# Patient Record
Sex: FEMALE | Race: WHITE | NOT HISPANIC OR LATINO | Employment: OTHER | URBAN - METROPOLITAN AREA
[De-identification: names, ages, dates, MRNs, and addresses within clinical notes are randomized per-mention and may not be internally consistent; named-entity substitution may affect disease eponyms.]

---

## 2017-02-01 ENCOUNTER — ALLSCRIPTS OFFICE VISIT (OUTPATIENT)
Dept: OTHER | Facility: OTHER | Age: 82
End: 2017-02-01

## 2017-08-16 ENCOUNTER — ALLSCRIPTS OFFICE VISIT (OUTPATIENT)
Dept: OTHER | Facility: OTHER | Age: 82
End: 2017-08-16

## 2018-01-14 VITALS
OXYGEN SATURATION: 97 % | HEART RATE: 82 BPM | HEIGHT: 64 IN | BODY MASS INDEX: 33.46 KG/M2 | DIASTOLIC BLOOD PRESSURE: 89 MMHG | WEIGHT: 196 LBS | SYSTOLIC BLOOD PRESSURE: 122 MMHG

## 2018-01-14 VITALS
HEART RATE: 72 BPM | HEIGHT: 64 IN | SYSTOLIC BLOOD PRESSURE: 98 MMHG | BODY MASS INDEX: 32.61 KG/M2 | DIASTOLIC BLOOD PRESSURE: 72 MMHG | WEIGHT: 191 LBS | OXYGEN SATURATION: 96 %

## 2018-01-16 NOTE — PROGRESS NOTES
Assessment  Assessed    1  Essential hypertension (401 9) (I10)   2  Hyperlipidemia (272 4) (E78 5)   3  Paroxysmal atrial fibrillation (427 31) (I48 0)   4  Chronic anticoagulation (V58 61) (Z79 01)    Plan  AF (atrial fibrillation)    · Eliquis 5 MG Oral Tablet; TAKE 1 TABLETBY MOUTH TWICE DAILY   Rx By: Arslan Carlson; Dispense: 30 Days ; #:60 Tablet; Refill: 5; For: AF (atrial fibrillation); LUCILA = N; Print Rx    Discussion/Summary  Cardiology Discussion Summary Free Text Note Form [facility]:   Patient with multiple medical problems who seems to be doing reasonably well from cardiac standpoint  Previous studies reviewed with patient, medications reviewed and possible side effects discussed  Continue risk factor modification  All questions answered  Safety measures reviewed  Patient advised to report any problems prompting medical attention  Discussed concepts of atherosclerosis, atrial fibrillation, anticoagulation signs and symptoms of cardiac disease  Patient had a stroke and is thought to be secondary to embolic phenomena secondary to paroxysmal atrial fibrillation  Risks and benefits of anticoagulation discussed again with patient and family  Patient to report any bleeding issues  Patient will continue to follow-up with her primary care physician  Follow-up in 6 months or earlier as needed  Counseling Documentation With Imm: The patient was counseled regarding instructions for management, risk factor reductions, impressions, risks and benefits of treatment options  Chief Complaint  Chief Complaint Chronic Condition [facility]: Patient is here today for follow up of chronic conditions described in HPI  History of Present Illness  Cardiology HPI Free Text Note Form [facility]: Patient presents for follow-up visit  She denies any history of chest pain, no shortness of breath out of the ordinary  No history of leg edema, orthopnea, PND  No history of presyncope, syncope   Patient states that she has been compliant with all her present medications she is on anticoagulation for paroxysmal atrial fibrillation and is tolerating it well  Patient was also seen by neurologist recently  Review of Systems  Cardiology Female ROS:     Cardiac: as noted in HPI  Skin: No complaints of nonhealing sores or skin rash  Genitourinary: No complaints of recurrent urinary tract infections, frequent urination at night, difficult urination, blood in urine, kidney stones, loss of bladder control, kidney problems, denies any birth control or hormone replacement, is not post menopausal, not currently pregnant  Psychological: No complaints of feeling depressed, anxiety, panic attacks, or difficulty concentrating  General: No complaints of trouble sleeping, lack of energy, fatigue, appetite changes, weight changes, fever, frequent infections, or night sweats  Respiratory: No complaints of shortness of breath, cough with sputum, or wheezing  HEENT: No complaints of serious problems, hearing problems, nose problems, throat problems, or snoring  Gastrointestinal: No complaints of liver problems, nausea, vomiting, heartburn, constipation, bloody stools, diarrhea, problems swallowing, adbominal pain, or rectal bleeding  Hematologic: No complaints of bleeding disorders, anemia, blood clots, or excessive brusing  Neurological: as noted in HPI   Musculoskeletal: No complaints of arthritis, back pain, or painfull swelling  ROS Reviewed:   ROS reviewed  Active Problems  Problems    1  Abnormal stress test (794 39) (R94 39)   2  Essential hypertension (401 9) (I10)   3  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)   4  Hyperlipidemia (272 4) (E78 5)   5  Shortness of breath (786 05) (R06 02)    Past Medical History  Problems    1  History of essential hypertension (V12 59) (Z86 79)   2  History of hyperlipidemia (V12 29) (Z86 39)  Active Problems And Past Medical History Reviewed:    The active problems and past medical history were reviewed and updated today  Family History  Mother    1  Family history of Diabetes (250 00) (E11 9)  Maternal Grandmother    2  Family history of High blood pressure (401 9) (I10)  Family History Reviewed: The family history was reviewed and updated today  Social History  Problems    · Never a smoker   · No alcohol use   · No drug use  Social History Reviewed: The social history was reviewed and updated today  Current Meds   1  Aspirin 81 MG Oral Tablet; Take 1 tablet daily Recorded   2  Atorvastatin Calcium 20 MG Oral Tablet; Take 1 tablet daily Recorded   3  Eliquis 5 MG Oral Tablet; Take 1 tablet twice daily; Therapy: [id number] to (Evaluate:30Apr2016)  Requested for: 67Edu6150; Last   Rx:[id number] Ordered   4  Laxative Pills TABS; USE AS DIRECTED Recorded   5  Lisinopril-Hydrochlorothiazide 20-12 5 MG Oral Tablet; 1/2 tab daily; Therapy: [phone]) to Recorded   6  Metoprolol Succinate ER 50 MG Oral Tablet Extended Release 24 Hour; Therapy: ([phone]) to Recorded  Medication List Reviewed: The medication list was reviewed and updated today  Allergies  Medication    1  Percocet TABS   2  Vicodin TABS    Vitals  Vital Signs [Data Includes: Current Encounter]    Recorded: 71IPN5915 11:04AM   Heart Rate 66   Systolic 169   Diastolic 64   Height 5 ft 4 in   Weight 191 lb 6 08 oz   BMI Calculated 32 85   BSA Calculated 1 93     Physical Exam    Constitutional   General appearance: No acute distress, well appearing and well nourished  Eyes   Conjunctiva and Sclera examination: Conjunctiva pink, sclera anicteric  Ears, Nose, Mouth, and Throat - Oropharynx: Clear, nares are clear, mucous membranes are moist    Neck   Neck and thyroid: Normal, supple, trachea midline, no thyromegaly  Pulmonary   Respiratory effort: No increased work of breathing or signs of respiratory distress      Auscultation of lungs: Clear to auscultation, no rales, no rhonchi, no wheezing, good air movement  Cardiovascular   Auscultation of heart: Normal rate and rhythm, normal S1 and S2, no murmurs  Carotid pulses: Normal, 2+ bilaterally  Peripheral vascular exam: Normal pulses throughout, no tenderness, erythema or swelling  Pedal pulses: Normal, 2+ bilaterally  Examination of extremities for edema and/or varicosities: Normal     Abdomen   Abdomen: Non-tender and no distention  Liver and spleen: No hepatomegaly or splenomegaly  Musculoskeletal Gait and station: Normal gait  Digits and nails: Normal without clubbing or cyanosis  Inspection/palpation of joints, bones, and muscles: Normal, ROM normal     Skin - Skin and subcutaneous tissue: Normal without rashes or lesions  Skin is warm and well perfused, normal turgor  Neurologic - Cranial nerves: II - XII intact  Speech: Normal     Psychiatric - Orientation to person, place, and time: Normal  Mood and affect: Normal       Future Appointments    Date/Time Provider Specialty Site   07/27/2016 11:00 AM NOELLE Vera   [place] [facility] [place] [facility]     Signatures   Electronically signed by : NOELLE Gutiérrez ; Jan 28 2016  8:14PM EST                       (Author)

## 2018-02-12 RX ORDER — METOPROLOL SUCCINATE 25 MG/1
TABLET, EXTENDED RELEASE ORAL
COMMUNITY
End: 2020-06-29 | Stop reason: SDUPTHER

## 2018-02-12 RX ORDER — LISINOPRIL AND HYDROCHLOROTHIAZIDE 20; 12.5 MG/1; MG/1
0.5 TABLET ORAL DAILY
COMMUNITY

## 2018-02-12 RX ORDER — ATORVASTATIN CALCIUM 20 MG/1
1 TABLET, FILM COATED ORAL DAILY
COMMUNITY

## 2018-02-12 RX ORDER — LORATADINE 10 MG/1
10 TABLET ORAL DAILY
Refills: 5 | COMMUNITY
Start: 2017-12-07

## 2018-02-12 RX ORDER — APIXABAN 5 MG/1
5 TABLET, FILM COATED ORAL 2 TIMES DAILY
Refills: 3 | COMMUNITY
Start: 2017-12-20

## 2018-02-14 ENCOUNTER — OFFICE VISIT (OUTPATIENT)
Dept: CARDIOLOGY CLINIC | Facility: CLINIC | Age: 83
End: 2018-02-14
Payer: MEDICARE

## 2018-02-14 VITALS
BODY MASS INDEX: 30.29 KG/M2 | HEART RATE: 53 BPM | SYSTOLIC BLOOD PRESSURE: 132 MMHG | OXYGEN SATURATION: 97 % | WEIGHT: 177.4 LBS | HEIGHT: 64 IN | DIASTOLIC BLOOD PRESSURE: 70 MMHG

## 2018-02-14 DIAGNOSIS — E78.2 COMBINED HYPERLIPIDEMIA: ICD-10-CM

## 2018-02-14 DIAGNOSIS — I10 HYPERTENSION, ESSENTIAL: ICD-10-CM

## 2018-02-14 DIAGNOSIS — Z79.01 CHRONIC ANTICOAGULATION: ICD-10-CM

## 2018-02-14 DIAGNOSIS — I48.0 PAROXYSMAL A-FIB (HCC): Primary | ICD-10-CM

## 2018-02-14 PROBLEM — Z96.652 HISTORY OF LEFT KNEE REPLACEMENT: Status: ACTIVE | Noted: 2018-02-14

## 2018-02-14 PROCEDURE — 99214 OFFICE O/P EST MOD 30 MIN: CPT | Performed by: INTERNAL MEDICINE

## 2018-02-14 NOTE — PROGRESS NOTES
Cardiology Follow Up    Mona Parcel  1935  [id number]  [address] [place] [id number]    1  Paroxysmal a-fib (HCC)     2  Chronic anticoagulation     3  Hypertension, essential     4  Combined hyperlipidemia       Chief Complaint   Patient presents with    Follow-up       Interval History:  Patient presents for follow-up visit  Patient denies any history of chest pain  No shortness of breath out of the ordinary  No history of leg edema orthopnea PND  Patient has been bothered with sciatica pain  Patient did have left knee replacement surgery  Patient denies any history of bleeding issues  Patient is on anticoagulation with Coumadin  Patient does have history of TIA/CVA  She states that she has been compliant with all her present medications  Patient Active Problem List   Diagnosis    Paroxysmal a-fib ([address] )    Chronic anticoagulation    Hypertension, essential    Combined hyperlipidemia    History of left knee replacement     Past Medical History:   Diagnosis Date    Stroke [facility])      Social History     Social History    Marital status: /Civil Union     Spouse name: N/A    Number of children: N/A    Years of education: N/A     Occupational History    Not on file  Social History Main Topics    Smoking status: Never Smoker    Smokeless tobacco: Never Used    Alcohol use No    Drug use: No    Sexual activity: Not on file     Other Topics Concern    Not on file     Social History Narrative    No narrative on file      No family history on file    Past Surgical History:   Procedure Laterality Date    REPLACEMENT TOTAL KNEE         Current Outpatient Prescriptions:     atorvastatin (LIPITOR) 20 mg tablet, Take 1 tablet by mouth daily, Disp: , Rfl:     ELIQUIS 5 MG, Take 5 mg by mouth 2 (two) times a day, Disp: , Rfl: 3    lisinopril-hydrochlorothiazide (PRINZIDE,ZESTORETIC) 20-12 5 MG per tablet, Take 0 5 tablets by mouth daily, Disp: , Rfl:     loratadine (CLARITIN) 10 mg tablet, Take 10 mg by mouth daily, Disp: , Rfl: 5    metoprolol succinate (TOPROL-XL) 25 mg 24 hr tablet, Take 1 tablet by mouth daily, Disp: , Rfl:     Sennosides (LAXATIVE PILLS PO), Take 1 tablet by mouth Twice daily, Disp: , Rfl:   Allergies   Allergen Reactions    Hydrocodone     Oxycodone        Labs:  No visits with results within 2 Month(s) from this visit  Latest known visit with results is:   No results found for any previous visit  Imaging: No results found  Review of Systems:  Review of Systems   REVIEW OF SYSTEMS:  Constitutional:  Denies fever or chills   Eyes:  Denies change in visual acuity   HENT:  Denies nasal congestion or sore throat   Respiratory:  Denies cough or shortness of breath   Cardiovascular:  Denies chest pain or edema   GI:  Denies abdominal pain, nausea, vomiting, bloody stools or diarrhea   :  Denies dysuria, frequency, difficulty in micturition and nocturia  Musculoskeletal:  Sciatica back pain  Neurologic:  Denies headache, focal weakness or sensory changes   Endocrine:  Denies polyuria or polydipsia   Lymphatic:  Denies swollen glands   Psychiatric:  Denies depression or anxiety   /70   Pulse (!) 53   Ht 5' 4" (1 626 m)   Wt 80 5 kg (177 lb 6 4 oz)   SpO2 97%   BMI 30 45 kg/m²     Physical Exam:  Physical Exam   PHYSICAL EXAM:  General:  Patient is not in acute distress   Head: Normocephalic, Atraumatic  HEENT:  Both pupils normal-size atraumatic, normocephalic, nonicteric  Neck:  JVP not raised  Trachea central  No carotid bruit  Respiratory:  normal breath sounds no crackles  no rhonchi  Cardiovascular:  Regular rate and rhythm no S3 no murmurs  GI:  Abdomen soft nontender  No organomegaly  Lymphatic:  No cervical or inguinal lymphadenopathy  Neurologic:  Patient is awake alert, oriented    Grossly nonfocal    Discussion/Summary:  Patient with multiple medical problems who seems to be doing reasonably well from cardiac standpoint  Previous studies reviewed with patient  Medications reviewed and possible side effects discussed  concepts of cardiovascular disease , signs and symptoms of heart disease  Dietary and risk factor modification reinforced  All questions answered  Safety measures reviewed  Patient advised to report any problems prompting medical attention  Patient understands the risks and benefits of anticoagulation to prevent thromboembolic risk  Patient is on Eliquis  All her medications were reviewed  Importance of salt restriction reinforced with the patient  Patient to continue present antihypertensives as well as statin medications  Follow-up with primary care physician  Followup in 6 months

## 2018-08-08 ENCOUNTER — OFFICE VISIT (OUTPATIENT)
Dept: CARDIOLOGY CLINIC | Facility: CLINIC | Age: 83
End: 2018-08-08
Payer: MEDICARE

## 2018-08-08 VITALS
HEART RATE: 83 BPM | BODY MASS INDEX: 31.76 KG/M2 | SYSTOLIC BLOOD PRESSURE: 110 MMHG | WEIGHT: 186 LBS | OXYGEN SATURATION: 98 % | HEIGHT: 64 IN | DIASTOLIC BLOOD PRESSURE: 78 MMHG

## 2018-08-08 DIAGNOSIS — Z79.01 CHRONIC ANTICOAGULATION: ICD-10-CM

## 2018-08-08 DIAGNOSIS — I10 HYPERTENSION, ESSENTIAL: ICD-10-CM

## 2018-08-08 DIAGNOSIS — E78.2 COMBINED HYPERLIPIDEMIA: ICD-10-CM

## 2018-08-08 DIAGNOSIS — I48.0 PAROXYSMAL A-FIB (HCC): Primary | ICD-10-CM

## 2018-08-08 PROCEDURE — 99214 OFFICE O/P EST MOD 30 MIN: CPT | Performed by: INTERNAL MEDICINE

## 2018-08-08 RX ORDER — VITAMIN B COMPLEX
1 CAPSULE ORAL DAILY
COMMUNITY
End: 2019-11-27

## 2018-08-08 NOTE — PROGRESS NOTES
[facility]  [address] [facility]  [address]  Cardiology Follow Up    Hussein Katharina  1935  [id number]      1  [facility]ox[facility]fib ([facility] )     2  Hypertension, essential     3  Chronic anticoagulation     4  Combined hyperlipidemia         Chief Complaint   Patient presents with    Follow-up       Interval History:   Patient presents for follow-up visit  Patient denies any history of chest pain shortness of breath  Patient denies any history of leg edema or orthopnea PND  No history of presyncope syncope  Patient states compliance with the present list of medications  Patient denies any bleeding issues  Patient is on anticoagulation for paroxysmal atrial fibrillation  Patient Active Problem List   Diagnosis    [facility]    Chronic anticoagulation    Hypertension, essential    Combined hyperlipidemia    History of left knee replacement     Past Medical History:   Diagnosis Date    Stroke [facility])      Social History     Social History    Marital status: /Civil Union     Spouse name: N/A    Number of children: N/A    Years of education: N/A     Occupational History    Not on file  Social History Main Topics    Smoking status: Never Smoker    Smokeless tobacco: Never Used    Alcohol use No    Drug use: No    Sexual activity: Not on file     Other Topics Concern    Not on file     Social History Narrative    No narrative on file      History reviewed  No pertinent family history    Past Surgical History:   Procedure Laterality Date    REPLACEMENT TOTAL KNEE         Current Outpatient Prescriptions:     atorvastatin (LIPITOR) 20 mg tablet, Take 1 tablet by mouth daily, Disp: , Rfl:     b complex vitamins capsule, Take 1 capsule by mouth daily, Disp: , Rfl:     ELIQUIS 5 MG, Take 5 mg by mouth 2 (two) times a day, Disp: , Rfl: 3    lisinopril-hydrochlorothiazide (PRINZIDE,ZESTORETIC) 20-12 5 MG per tablet, Take 0 5 tablets by mouth daily, Disp: , Rfl:     loratadine (CLARITIN) 10 mg tablet, Take 10 mg by mouth daily, Disp: , Rfl: 5    metoprolol succinate (TOPROL-XL) 25 mg 24 hr tablet, Take by mouth Take 1/2 tablet daily , Disp: , Rfl:     Sennosides (LAXATIVE PILLS PO), Take 1 tablet by mouth Twice daily, Disp: , Rfl:   Allergies   Allergen Reactions    Hydrocodone     Oxycodone        Labs:  No visits with results within 2 Month(s) from this visit  Latest known visit with results is:   No results found for any previous visit  Imaging: No results found  Review of Systems:  Review of Systems   REVIEW OF SYSTEMS:  Constitutional:  Denies fever or chills   Eyes:  Denies change in visual acuity   HENT:  Denies nasal congestion or sore throat   Respiratory:  Denies cough or shortness of breath   Cardiovascular:  Denies chest pain or edema   GI:  Denies abdominal pain, nausea, vomiting, bloody stools or diarrhea   :  Denies dysuria, frequency, difficulty in micturition and nocturia  Musculoskeletal:  Denies back pain or joint pain   Neurologic:  Denies headache, focal weakness or sensory changes   Endocrine:  Denies polyuria or polydipsia   Lymphatic:  Denies swollen glands   Psychiatric:  Denies depression or anxiety     Physical Exam:    /78   Pulse 83   Ht 5' 4" (1 626 m)   Wt 84 4 kg (186 lb)   SpO2 98%   BMI 31 93 kg/m²     Physical Exam   PHYSICAL EXAM:  General:  Patient is not in acute distress   Head: Normocephalic, Atraumatic  HEENT:  Both pupils normal-size atraumatic, normocephalic, nonicteric  Neck:  JVP not raised  Trachea central  No carotid bruit  Respiratory:  normal breath sounds no crackles  no rhonchi  Cardiovascular:  Regular rate and rhythm no S3 no murmurs  GI:  Abdomen soft nontender  No organomegaly  Lymphatic:  No cervical or inguinal lymphadenopathy  Neurologic:  Patient is awake alert, oriented    Grossly nonfocal    Discussion/Summary:  Patient with multiple medical problems who seems to be doing reasonably well from cardiac standpoint  Previous studies reviewed with patient  Medications reviewed and possible side effects discussed  concepts of cardiovascular disease , signs and symptoms of heart disease  Dietary and risk factor modification reinforced  All questions answered  Safety measures reviewed  Patient advised to report any problems prompting medical attention  Patient understands the risks and benefits of anticoagulation to prevent thromboembolic risk from paroxysmal atrial fibrillation  Patient report any bleeding issues  Medications reviewed  Importance of salt restriction reinforced  Follow-up with primary care physician  Followup in 6 months or earlier as needed  Patient is agreeable with the plan of care  Patient does have history of polymyalgia rheumatica and has an appointment with rheumatology is coming up soon

## 2019-11-27 ENCOUNTER — OFFICE VISIT (OUTPATIENT)
Dept: CARDIOLOGY CLINIC | Facility: CLINIC | Age: 84
End: 2019-11-27
Payer: MEDICARE

## 2019-11-27 VITALS
OXYGEN SATURATION: 98 % | HEIGHT: 63 IN | SYSTOLIC BLOOD PRESSURE: 138 MMHG | HEART RATE: 62 BPM | WEIGHT: 182 LBS | BODY MASS INDEX: 32.25 KG/M2 | DIASTOLIC BLOOD PRESSURE: 80 MMHG

## 2019-11-27 DIAGNOSIS — I48.0 PAROXYSMAL A-FIB (HCC): Primary | ICD-10-CM

## 2019-11-27 DIAGNOSIS — E78.2 COMBINED HYPERLIPIDEMIA: ICD-10-CM

## 2019-11-27 DIAGNOSIS — I10 HYPERTENSION, ESSENTIAL: ICD-10-CM

## 2019-11-27 DIAGNOSIS — Z79.01 CHRONIC ANTICOAGULATION: ICD-10-CM

## 2019-11-27 PROCEDURE — 99214 OFFICE O/P EST MOD 30 MIN: CPT | Performed by: INTERNAL MEDICINE

## 2019-11-27 RX ORDER — PREDNISONE 10 MG/1
5 TABLET ORAL DAILY
COMMUNITY

## 2019-11-27 NOTE — PROGRESS NOTES
[facility] [address] [facility] [id number]  Cardiology Follow Up    Elliott Briseno  1935  [id number]      1  Paroxysmal A-fib ([address] )     2  Chronic anticoagulation     3  Hypertension, essential     4  Combined hyperlipidemia         Chief Complaint   Patient presents with    Follow-up       Interval History:  Patient presents for follow-up visit  Patient denies any history of chest pain  Does have some shortness of breath which is stable  No history of bleeding issues  Patient was diagnosed recently to have polymyalgia rheumatica and has been on prednisone  Patient is in the process of establishing with the Rheumatology  Patient denies any history of leg edema orthopnea PND  No history of presyncope syncope  She states that she has been compliant with all her present medications      Patient Active Problem List   Diagnosis    Paroxysmal A-fib (HCC)    Chronic anticoagulation    Hypertension, essential    Combined hyperlipidemia    History of left knee replacement     Past Medical History:   Diagnosis Date    Stroke [facility])      Social History     Socioeconomic History    Marital status: /Civil Union     Spouse name: Not on file    Number of children: Not on file    Years of education: Not on file    Highest education level: Not on file   Occupational History    Not on file   Social Needs    Financial resource strain: Not on file    Food insecurity:     Worry: Not on file     Inability: Not on file    Transportation needs:     Medical: Not on file     Non-medical: Not on file   Tobacco Use    Smoking status: Never Smoker    Smokeless tobacco: Never Used   Substance and Sexual Activity    Alcohol use: No    Drug use: No    Sexual activity: Not on file   Lifestyle    Physical activity:     Days per week: Not on file     Minutes per session: Not on file    Stress: Not on file   Relationships    Social connections:     Talks on phone: Not on file     Gets together: Not on file     Attends Sabianism service: Not on file     Active member of club or organization: Not on file     Attends meetings of clubs or organizations: Not on file     Relationship status: Not on file    Intimate partner violence:     Fear of current or ex partner: Not on file     Emotionally abused: Not on file     Physically abused: Not on file     Forced sexual activity: Not on file   Other Topics Concern    Not on file   Social History Narrative    Not on file      History reviewed  No pertinent family history  Past Surgical History:   Procedure Laterality Date    REPLACEMENT TOTAL KNEE         Current Outpatient Medications:     atorvastatin (LIPITOR) 20 mg tablet, Take 1 tablet by mouth daily, Disp: , Rfl:     ELIQUIS 5 MG, Take 5 mg by mouth 2 (two) times a day, Disp: , Rfl: 3    lisinopril-hydrochlorothiazide (PRINZIDE,ZESTORETIC) 20-12 5 MG per tablet, Take 0 5 tablets by mouth daily, Disp: , Rfl:     loratadine (CLARITIN) 10 mg tablet, Take 10 mg by mouth daily, Disp: , Rfl: 5    metoprolol succinate (TOPROL-XL) 25 mg 24 hr tablet, Take by mouth Take 1/2 tablet daily , Disp: , Rfl:     predniSONE 10 mg tablet, Take by mouth daily, Disp: , Rfl:     Sennosides (LAXATIVE PILLS PO), Take 1 tablet by mouth Twice daily, Disp: , Rfl:   Allergies   Allergen Reactions    Hydrocodone     Oxycodone        Labs:  No visits with results within 2 Month(s) from this visit  Latest known visit with results is:   No results found for any previous visit  Imaging: No results found      Review of Systems:  Review of Systems   REVIEW OF SYSTEMS:  Constitutional:  Denies fever or chills   Eyes:  Denies change in visual acuity   HENT:  Denies nasal congestion or sore throat   Respiratory:  Denies cough or shortness of breath   Cardiovascular:  Denies chest pain or edema   GI:  Denies abdominal pain, nausea, vomiting, bloody stools or diarrhea   :  Denies dysuria, frequency, difficulty in micturition and nocturia  Musculoskeletal:  Denies back pain or joint pain   Neurologic:  Denies headache, focal weakness or sensory changes   Endocrine:  Denies polyuria or polydipsia   Lymphatic:  Denies swollen glands   Psychiatric:  Denies depression or anxiety     Physical Exam:    /80   Pulse 62   Ht 5' 3" (1 6 m)   Wt 82 6 kg (182 lb)   SpO2 98%   BMI 32 24 kg/m²     Physical Exam  PHYSICAL EXAM:  General:  Patient is not in acute distress   Head: Normocephalic, Atraumatic  HEENT:  Both pupils normal-size atraumatic, normocephalic, nonicteric  Neck:  JVP not raised  Trachea central  No carotid bruit  Respiratory:  normal breath sounds no crackles  no rhonchi  Cardiovascular:  Irregularly irregular  GI:  Abdomen soft nontender  No organomegaly  Lymphatic:  No cervical or inguinal lymphadenopathy  Neurologic:  Patient is awake alert, oriented   Grossly nonfocal  Extremities trace edema    Discussion/Summary:  Patient with multiple medical problems who seems to be doing reasonably well from cardiac standpoint  Previous studies reviewed with patient  Medications reviewed and possible side effects discussed  concepts of cardiovascular disease , signs and symptoms of heart disease  Dietary and risk factor modification reinforced  All questions answered  Safety measures reviewed  Patient advised to report any problems prompting medical attention  Patient understands the risks and benefits of anticoagulation to prevent thromboembolic risk from atrial fibrillation  Patient to report any bleeding issues  Importance of salt restriction reinforced  Follow-up with primary care physician as well as Rheumatology  Medications reviewed  Patient had a few questions which were answered  Follow-up in 6 months or earlier as needed

## 2020-06-29 ENCOUNTER — OFFICE VISIT (OUTPATIENT)
Dept: CARDIOLOGY CLINIC | Facility: CLINIC | Age: 85
End: 2020-06-29
Payer: MEDICARE

## 2020-06-29 VITALS
WEIGHT: 186 LBS | SYSTOLIC BLOOD PRESSURE: 140 MMHG | BODY MASS INDEX: 32.96 KG/M2 | HEART RATE: 96 BPM | HEIGHT: 63 IN | OXYGEN SATURATION: 98 % | DIASTOLIC BLOOD PRESSURE: 76 MMHG

## 2020-06-29 DIAGNOSIS — I48.0 PAROXYSMAL A-FIB (HCC): Primary | ICD-10-CM

## 2020-06-29 DIAGNOSIS — E78.2 COMBINED HYPERLIPIDEMIA: ICD-10-CM

## 2020-06-29 DIAGNOSIS — Z79.01 CHRONIC ANTICOAGULATION: ICD-10-CM

## 2020-06-29 DIAGNOSIS — I10 HYPERTENSION, ESSENTIAL: ICD-10-CM

## 2020-06-29 PROCEDURE — 99214 OFFICE O/P EST MOD 30 MIN: CPT | Performed by: INTERNAL MEDICINE

## 2020-06-29 RX ORDER — METOPROLOL SUCCINATE 25 MG/1
25 TABLET, EXTENDED RELEASE ORAL DAILY
Start: 2020-06-29

## 2020-06-29 RX ORDER — TRAMADOL HYDROCHLORIDE 50 MG/1
TABLET ORAL
COMMUNITY
Start: 2020-03-26

## 2020-06-29 RX ORDER — FLUTICASONE PROPIONATE 50 MCG
SPRAY, SUSPENSION (ML) NASAL
COMMUNITY
Start: 2020-04-19

## 2021-02-08 ENCOUNTER — TELEPHONE (OUTPATIENT)
Dept: VASCULAR SURGERY | Facility: CLINIC | Age: 86
End: 2021-02-08

## 2021-02-15 ENCOUNTER — TELEPHONE (OUTPATIENT)
Dept: ADMINISTRATIVE | Facility: HOSPITAL | Age: 86
End: 2021-02-15

## 2021-02-15 NOTE — TELEPHONE ENCOUNTER

## 2021-02-16 ENCOUNTER — OFFICE VISIT (OUTPATIENT)
Dept: VASCULAR SURGERY | Facility: CLINIC | Age: 86
End: 2021-02-16
Payer: MEDICARE

## 2021-02-16 VITALS
HEIGHT: 63 IN | HEART RATE: 78 BPM | TEMPERATURE: 97.5 F | BODY MASS INDEX: 32.25 KG/M2 | WEIGHT: 182 LBS | DIASTOLIC BLOOD PRESSURE: 80 MMHG | SYSTOLIC BLOOD PRESSURE: 140 MMHG

## 2021-02-16 DIAGNOSIS — L97.909 ATHEROSCLEROSIS OF ARTERY OF EXTREMITY WITH ULCERATION (HCC): Primary | ICD-10-CM

## 2021-02-16 DIAGNOSIS — I70.299 ATHEROSCLEROSIS OF ARTERY OF EXTREMITY WITH ULCERATION (HCC): Primary | ICD-10-CM

## 2021-02-16 PROCEDURE — 99203 OFFICE O/P NEW LOW 30 MIN: CPT | Performed by: SURGERY

## 2021-02-16 NOTE — ASSESSMENT & PLAN NOTE
Will get vascular duplex to further evaluate her foot circulation  I suspect that there are tibial disease / SFA disease that may need angiogram and intervention to improve arterial flow for wound healing  Procedure explained to patient including risks of bleeding, non healing  She is on eliquis for A fib

## 2021-02-16 NOTE — PROGRESS NOTES
Assessment/Plan:    Atherosclerosis of artery of extremity with ulceration ([address] )      Will get vascular duplex to further evaluate her foot circulation  I suspect that there are tibial disease / SFA disease that may need intervention to improve arterial flow for wound healing  Procedure explained to patient including risks of bleeding, non healing  She is on eliquis for A fib  Diagnoses and all orders for this visit:    Atherosclerosis of artery of extremity with ulceration (HCC)  -     VAS lower limb arterial duplex, complete bilateral; Future          Subjective:      Patient ID: Pankaj Avendano is a 80 y o  female  New patient w/ non-healing wound of R great toe, presents today for evaluation  Referred by Dr Jd Salazar at [facility]  HPI  New right toe ulcer ongoing for past 1 month  Does not recall any trauma causing it  Going to podiatrist for wound care  Applying neosporin  No drainage, no fever, mild redness around it  She also has pain in the toe  She has had b/l leg / calf cramps ongoing for past few weeks  The following portions of the patient's history were reviewed and updated as appropriate: allergies, current medications, past family history, past medical history, past social history, past surgical history and problem list     Review of Systems   Constitutional: Negative  HENT: Positive for hearing loss  Eyes: Negative  Respiratory: Negative  Cardiovascular: Positive for leg swelling  Gastrointestinal: Negative  Endocrine: Negative  Genitourinary: Negative  Musculoskeletal:        Leg pain   Skin: Positive for color change and wound  Allergic/Immunologic: Negative  Hematological: Bruises/bleeds easily  Psychiatric/Behavioral: Negative  I have reviewed the ROS as entered and made changes as necessary        Objective:      /80 (BP Location: Right arm, Patient Position: Sitting)   Pulse 78   Temp 97 5 °F (36 4 °C) (Tympanic)   Ht 5' 3" (1 6 m)   Wt 82 6 kg (182 lb)   BMI 32 24 kg/m²          Physical Exam  Vitals signs and nursing note reviewed  Constitutional:       Appearance: Normal appearance  HENT:      Head: Normocephalic and atraumatic  Cardiovascular:      Rate and Rhythm: Normal rate  Rhythm irregular  Pulses:           Femoral pulses are 2+ on the right side and 2+ on the left side  Popliteal pulses are 0 on the right side and 0 on the left side  Dorsalis pedis pulses are detected w/ Doppler on the right side and detected w/ Doppler on the left side  Posterior tibial pulses are detected w/ Doppler on the right side and detected w/ Doppler on the left side  Heart sounds: Normal heart sounds  Comments: Monophasic and bi phasic DP and PT  Pulmonary:      Effort: Pulmonary effort is normal       Breath sounds: Normal breath sounds  Abdominal:      General: There is no distension  Palpations: Abdomen is soft  There is no mass  Tenderness: There is no abdominal tenderness  Musculoskeletal:         General: Tenderness (right great toe) present  No signs of injury  Right lower leg: No edema  Left lower leg: No edema  Skin:     General: Skin is warm and dry  Capillary Refill: Capillary refill takes less than 2 seconds  Findings: Lesion (right toe ulcer) present  Neurological:      General: No focal deficit present  Mental Status: She is oriented to person, place, and time  Cranial Nerves: No cranial nerve deficit  Motor: No weakness        Gait: Gait abnormal    Psychiatric:         Mood and Affect: Mood normal          Behavior: Behavior normal

## 2021-02-16 NOTE — H&P (VIEW-ONLY)
Assessment/Plan:    Atherosclerosis of artery of extremity with ulceration ([address] )      Will get vascular duplex to further evaluate her foot circulation  I suspect that there are tibial disease / SFA disease that may need intervention to improve arterial flow for wound healing  Procedure explained to patient including risks of bleeding, non healing  She is on eliquis for A fib  Diagnoses and all orders for this visit:    Atherosclerosis of artery of extremity with ulceration (HCC)  -     VAS lower limb arterial duplex, complete bilateral; Future          Subjective:      Patient ID: Lalo Maldonado is a 80 y o  female  New patient w/ non-healing wound of R great toe, presents today for evaluation  Referred by Dr Adrienne Hernandez at [facility]  HPI  New right toe ulcer ongoing for past 1 month  Does not recall any trauma causing it  Going to podiatrist for wound care  Applying neosporin  No drainage, no fever, mild redness around it  She also has pain in the toe  She has had b/l leg / calf cramps ongoing for past few weeks  The following portions of the patient's history were reviewed and updated as appropriate: allergies, current medications, past family history, past medical history, past social history, past surgical history and problem list     Review of Systems   Constitutional: Negative  HENT: Positive for hearing loss  Eyes: Negative  Respiratory: Negative  Cardiovascular: Positive for leg swelling  Gastrointestinal: Negative  Endocrine: Negative  Genitourinary: Negative  Musculoskeletal:        Leg pain   Skin: Positive for color change and wound  Allergic/Immunologic: Negative  Hematological: Bruises/bleeds easily  Psychiatric/Behavioral: Negative  I have reviewed the ROS as entered and made changes as necessary        Objective:      /80 (BP Location: Right arm, Patient Position: Sitting)   Pulse 78   Temp 97 5 °F (36 4 °C) (Tympanic)   Ht 5' 3" (1 6 m)   Wt 82 6 kg (182 lb)   BMI 32 24 kg/m²          Physical Exam  Vitals signs and nursing note reviewed  Constitutional:       Appearance: Normal appearance  HENT:      Head: Normocephalic and atraumatic  Cardiovascular:      Rate and Rhythm: Normal rate  Rhythm irregular  Pulses:           Femoral pulses are 2+ on the right side and 2+ on the left side  Popliteal pulses are 0 on the right side and 0 on the left side  Dorsalis pedis pulses are detected w/ Doppler on the right side and detected w/ Doppler on the left side  Posterior tibial pulses are detected w/ Doppler on the right side and detected w/ Doppler on the left side  Heart sounds: Normal heart sounds  Comments: Monophasic and bi phasic DP and PT  Pulmonary:      Effort: Pulmonary effort is normal       Breath sounds: Normal breath sounds  Abdominal:      General: There is no distension  Palpations: Abdomen is soft  There is no mass  Tenderness: There is no abdominal tenderness  Musculoskeletal:         General: Tenderness (right great toe) present  No signs of injury  Right lower leg: No edema  Left lower leg: No edema  Skin:     General: Skin is warm and dry  Capillary Refill: Capillary refill takes less than 2 seconds  Findings: Lesion (right toe ulcer) present  Neurological:      General: No focal deficit present  Mental Status: She is oriented to person, place, and time  Cranial Nerves: No cranial nerve deficit  Motor: No weakness        Gait: Gait abnormal    Psychiatric:         Mood and Affect: Mood normal          Behavior: Behavior normal

## 2021-02-17 ENCOUNTER — HOSPITAL ENCOUNTER (OUTPATIENT)
Dept: NON INVASIVE DIAGNOSTICS | Facility: CLINIC | Age: 86
Discharge: HOME | End: 2021-02-17
Payer: MEDICARE

## 2021-02-17 DIAGNOSIS — I70.299 ATHEROSCLEROSIS OF ARTERY OF EXTREMITY WITH ULCERATION (HCC): ICD-10-CM

## 2021-02-17 DIAGNOSIS — L97.909 ATHEROSCLEROSIS OF ARTERY OF EXTREMITY WITH ULCERATION (HCC): ICD-10-CM

## 2021-02-17 PROCEDURE — 93925 LOWER EXTREMITY STUDY: CPT | Performed by: SURGERY

## 2021-02-17 PROCEDURE — 93922 UPR/L XTREMITY ART 2 LEVELS: CPT | Performed by: SURGERY

## 2021-02-17 PROCEDURE — 93925 LOWER EXTREMITY STUDY: CPT

## 2021-02-17 PROCEDURE — 93923 UPR/LXTR ART STDY 3+ LVLS: CPT

## 2021-02-19 ENCOUNTER — TELEPHONE (OUTPATIENT)
Dept: VASCULAR SURGERY | Facility: CLINIC | Age: 86
End: 2021-02-19

## 2021-02-19 NOTE — TELEPHONE ENCOUNTER
----- Message from Yaw Sheridan sent at 2/19/2021  9:51 AM EST -----  Call patient for OV with susi Guidry for telephone visit to discuss angiogram

## 2021-03-03 ENCOUNTER — TELEMEDICINE (OUTPATIENT)
Dept: VASCULAR SURGERY | Facility: CLINIC | Age: 86
End: 2021-03-03
Payer: MEDICARE

## 2021-03-03 ENCOUNTER — TELEPHONE (OUTPATIENT)
Dept: VASCULAR SURGERY | Facility: CLINIC | Age: 86
End: 2021-03-03

## 2021-03-03 VITALS
HEIGHT: 63 IN | BODY MASS INDEX: 32.25 KG/M2 | SYSTOLIC BLOOD PRESSURE: 157 MMHG | DIASTOLIC BLOOD PRESSURE: 89 MMHG | HEART RATE: 92 BPM | TEMPERATURE: 97.6 F | WEIGHT: 182 LBS

## 2021-03-03 DIAGNOSIS — L97.909 ATHEROSCLEROSIS OF ARTERY OF EXTREMITY WITH ULCERATION (HCC): Primary | ICD-10-CM

## 2021-03-03 DIAGNOSIS — I70.299 ATHEROSCLEROSIS OF ARTERY OF EXTREMITY WITH ULCERATION (HCC): Primary | ICD-10-CM

## 2021-03-03 PROCEDURE — 99443 PR PHYS/QHP TELEPHONE EVALUATION 21-30 MIN: CPT | Performed by: SURGERY

## 2021-03-03 RX ORDER — ACETAMINOPHEN AND CODEINE PHOSPHATE 300; 30 MG/1; MG/1
TABLET ORAL
COMMUNITY
Start: 2021-02-25

## 2021-03-03 NOTE — TELEPHONE ENCOUNTER
Is patient requesting a call when authorization has been obtained? Patient did not request a call  Surgery Date: 3-9-21  Primary Surgeon: Alida Lazo (NPI: [id number])  Assisting Surgeon: Not Applicable (N/A)  Facility: [facility] (Tax: [id number] / NPI: [id number]) Formerly [facility] (Tax: [id number] / NPI: [id number])  Inpatient / Outpatient: Outpatient  Level: 2    Clearance Received: No clearance ordered  Consent Received: Consent will be signed day of procedure  Medication Hold / Last Dose: Yes  VQI Spreadsheet: Not Applicable (N/A)  IR Notified: Yes  Rep  Notified: Not Applicable (N/A)  Equipment Needs: Not Applicable (N/A)  Vas Lab Requested: Not Applicable (N/A)  Patient Contacted: 3-3-21    Diagnosis: I70 299  Procedure/ CPT Code(s): Angiogram // CPT: 98715 [id number]    For varicose vein related procedures, last LEVDR? Not Applicable (N/A)    Post Operative Date/ Time: To Be Determined (TBD)     *Please review with patient med hold, PATs, and check H&P *  PATIENT WAS MAILED SURGERY/SHOWERING/DISCHARGE/COVID INSTRUCTIONS AFTER REVIEWING WITH HER VIA PHONE CALL  Spoke to patient to schedule procedure patient was told to have blood work done before the procedure   Confirmed VV 3-31-21 with Dr Lashanda Null   [facility]

## 2021-03-03 NOTE — ASSESSMENT & PLAN NOTE
Vascular duplex reviewed there is SFA disease that will need angiogram and intervention to improve arterial flow for wound healing  Procedure explained to patient including risks of bleeding, non healing  She is on eliquis for A fib      Operative Scheduling Information:    Hospital:  [facility]    Physician:  Valeria Pcae    Surgery: RIGHT LEG ANGIOGRAM WITH INTERVENTION    Urgency:  Standard    Level:  Level 2: Outpatients to be scheduled for surgery with time dependent medical necessity within 2 weeks    Case Length:  Normal    Post-op Bed:  Outpatient    OR Table:  IR    Equipment Needs:  None    Medication Instructions:  Eliquis:  Hold for 1 days prior to procedure    Hydration:  No

## 2021-03-03 NOTE — PROGRESS NOTES
Virtual Brief Visit    Assessment/Plan:    Problem List Items Addressed This Visit        Cardiovascular and Mediastinum    Atherosclerosis of artery of extremity with ulceration ([address] ) - Primary         Vascular duplex reviewed there is SFA disease that will need angiogram and intervention to improve arterial flow for wound healing  Procedure explained to patient including risks of bleeding, non healing  She is on eliquis for A fib  Operative Scheduling Information:    Hospital:  [facility]    Physician:  Nadine Hook    Surgery: RIGHT LEG ANGIOGRAM WITH INTERVENTION    Urgency:  Standard    Level:  Level 2: Outpatients to be scheduled for surgery with time dependent medical necessity within 2 weeks    Case Length:  Normal    Post-op Bed:  Outpatient    OR Table:  IR    Equipment Needs:  None    Medication Instructions:  Eliquis:  Hold for 1 days prior to procedure    Hydration:  No           Relevant Orders    IR aortagram with run-off    CBC and differential    Basic metabolic panel    Protime-INR                Reason for visit is   Chief Complaint   Patient presents with    Virtual Brief Visit        Encounter provider Lucas Storey MD    Provider located at [address] [place] [address] [address] [facility] [id number]    Recent Visits  No visits were found meeting these conditions  Showing recent visits within past 7 days and meeting all other requirements     Today's Visits  Date Type Provider Dept   03/03/21 Telemedicine Lucas Storey MD [address] [facility] today's visits and meeting all other requirements     Future Appointments  No visits were found meeting these conditions  Showing future appointments within next 150 days and meeting all other requirements        After connecting through telephone, the patient was identified by name and date of birth   Manuel Wilcox was informed that this is a telemedicine visit and that the visit is being conducted through telephone  My office door was closed  No one else was in the room  She acknowledged consent and understanding of privacy and security of the platform  The patient has agreed to participate and understands she can discontinue the visit at any time  It was my intent to perform this visit via video technology but the patient was not able to do a video connection so the visit was completed via audio telephone only  Patient is aware this is a billable service  Subjective    Nathan Reina is a 80 y o  female  Who presents for follow-up of right foot nonhealing wound and discussing the results of Doppler       Right foot nonhealing wound ongoing for more than a month  Also has pain when walking  Recently had Doppler done which shows blockages  Patient has questions regarding what next to do  Since I saw her 2 weeks ago there has been no significant worsening in her symptoms         Past Medical History:   Diagnosis Date    PMR (polymyalgia rheumatica) (HCC)     Stroke [facility])        Past Surgical History:   Procedure Laterality Date    REPLACEMENT TOTAL KNEE         Current Outpatient Medications   Medication Sig Dispense Refill    acetaminophen-codeine (TYLENOL #3) 300-30 mg per tablet       atorvastatin (LIPITOR) 20 mg tablet Take 1 tablet by mouth daily      ELIQUIS 5 MG Take 5 mg by mouth 2 (two) times a day  3    fluticasone (FLONASE) 50 mcg/act nasal spray SPRAY 2 SPRAYS INTO EACH NOSTRIL EVERY DAY      lisinopril-hydrochlorothiazide (PRINZIDE,ZESTORETIC) 20-12 5 MG per tablet Take 0 5 tablets by mouth daily      loratadine (CLARITIN) 10 mg tablet Take 10 mg by mouth daily  5    metoprolol succinate (TOPROL-XL) 25 mg 24 hr tablet Take 1 tablet (25 mg total) by mouth daily Take 1 tablet daily      predniSONE 10 mg tablet Take 5 mg by mouth daily       Sennosides (LAXATIVE PILLS PO) Take 1 tablet by mouth Twice daily      traMADol (ULTRAM) 50 mg tablet TAKE 1 TAB BY MOUTH 2 TIMES A DAY AS NEEDED FOR PAIN, SEVERE  No current facility-administered medications for this visit  Allergies   Allergen Reactions    Hydrocodone     Oxycodone        Review of Systems   Constitutional: Negative  HENT: Negative  Eyes: Negative  Respiratory: Negative  Cardiovascular: Positive for leg swelling (Foot swelling)  Gastrointestinal: Negative  Endocrine: Negative  Genitourinary: Negative  Musculoskeletal:        Calf pain when walking   Allergic/Immunologic: Negative  Neurological: Positive for numbness (Both feet)  Hematological: Negative  Psychiatric/Behavioral: Negative  Vitals:    03/03/21 1037   BP: 157/89   BP Location: Left arm   Patient Position: Sitting   Cuff Size: Standard   Pulse: 92   Temp: 97 6 °F (36 4 °C)   TempSrc: Oral   Weight: 82 6 kg (182 lb)   Height: 5' 3" (1 6 m)         I spent 30 minutes with patient today in which greater than 50% of the time was spent in counseling/coordination of care regarding Discussion of lower extremity arterial Doppler results and planning for arteriogram procedure    VIRTUAL VISIT DISCLAIMER    Tami Estrada acknowledges that she has consented to an online visit or consultation  She understands that the online visit is based solely on information provided by her, and that, in the absence of a face-to-face physical evaluation by the physician, the diagnosis she receives is both limited and provisional in terms of accuracy and completeness  This is not intended to replace a full medical face-to-face evaluation by the physician  Tremayne Lovelace understands and accepts these terms

## 2021-03-04 ENCOUNTER — TELEPHONE (OUTPATIENT)
Dept: INTERVENTIONAL RADIOLOGY/VASCULAR | Facility: HOSPITAL | Age: 86
End: 2021-03-04

## 2021-03-05 ENCOUNTER — LAB (OUTPATIENT)
Dept: LAB | Facility: HOSPITAL | Age: 86
End: 2021-03-05
Attending: SURGERY
Payer: MEDICARE

## 2021-03-05 ENCOUNTER — TELEPHONE (OUTPATIENT)
Dept: INTERVENTIONAL RADIOLOGY/VASCULAR | Facility: HOSPITAL | Age: 86
End: 2021-03-05

## 2021-03-05 DIAGNOSIS — I70.299 ATHEROSCLEROSIS OF ARTERY OF EXTREMITY WITH ULCERATION (HCC): ICD-10-CM

## 2021-03-05 DIAGNOSIS — L97.909 ATHEROSCLEROSIS OF ARTERY OF EXTREMITY WITH ULCERATION (HCC): ICD-10-CM

## 2021-03-05 LAB
ANION GAP SERPL CALCULATED.3IONS-SCNC: 8 MMOL/L (ref 4–13)
BASOPHILS # BLD AUTO: 0.05 THOUSANDS/ΜL (ref 0–0.1)
BASOPHILS NFR BLD AUTO: 1 % (ref 0–1)
BUN SERPL-MCNC: 20 MG/DL (ref 5–25)
CALCIUM SERPL-MCNC: 8.9 MG/DL (ref 8.3–10.1)
CHLORIDE SERPL-SCNC: 103 MMOL/L (ref 100–108)
CO2 SERPL-SCNC: 30 MMOL/L (ref 21–32)
CREAT SERPL-MCNC: 1.04 MG/DL (ref 0.6–1.3)
EOSINOPHIL # BLD AUTO: 0.3 THOUSAND/ΜL (ref 0–0.61)
EOSINOPHIL NFR BLD AUTO: 4 % (ref 0–6)
ERYTHROCYTE [DISTWIDTH] IN BLOOD BY AUTOMATED COUNT: 13.7 % (ref 11.6–15.1)
GFR SERPL CREATININE-BSD FRML MDRD: 49 ML/MIN/1.73SQ M
GLUCOSE P FAST SERPL-MCNC: 88 MG/DL (ref 65–99)
HCT VFR BLD AUTO: 45.5 % (ref 34.8–46.1)
HGB BLD-MCNC: 14.4 G/DL (ref 11.5–15.4)
IMM GRANULOCYTES # BLD AUTO: 0.03 THOUSAND/UL (ref 0–0.2)
IMM GRANULOCYTES NFR BLD AUTO: 0 % (ref 0–2)
INR PPP: 1.09 (ref 0.84–1.19)
LYMPHOCYTES # BLD AUTO: 1.75 THOUSANDS/ΜL (ref 0.6–4.47)
LYMPHOCYTES NFR BLD AUTO: 23 % (ref 14–44)
MCH RBC QN AUTO: 30.6 PG (ref 26.8–34.3)
MCHC RBC AUTO-ENTMCNC: 31.6 G/DL (ref 31.4–37.4)
MCV RBC AUTO: 97 FL (ref 82–98)
MONOCYTES # BLD AUTO: 0.9 THOUSAND/ΜL (ref 0.17–1.22)
MONOCYTES NFR BLD AUTO: 12 % (ref 4–12)
NEUTROPHILS # BLD AUTO: 4.63 THOUSANDS/ΜL (ref 1.85–7.62)
NEUTS SEG NFR BLD AUTO: 60 % (ref 43–75)
NRBC BLD AUTO-RTO: 0 /100 WBCS
PLATELET # BLD AUTO: 183 THOUSANDS/UL (ref 149–390)
PMV BLD AUTO: 10 FL (ref 8.9–12.7)
POTASSIUM SERPL-SCNC: 4.4 MMOL/L (ref 3.5–5.3)
PROTHROMBIN TIME: 14.4 SECONDS (ref 11.6–14.5)
RBC # BLD AUTO: 4.7 MILLION/UL (ref 3.81–5.12)
SODIUM SERPL-SCNC: 141 MMOL/L (ref 136–145)
WBC # BLD AUTO: 7.66 THOUSAND/UL (ref 4.31–10.16)

## 2021-03-05 PROCEDURE — 85610 PROTHROMBIN TIME: CPT

## 2021-03-05 PROCEDURE — 36415 COLL VENOUS BLD VENIPUNCTURE: CPT

## 2021-03-05 PROCEDURE — 85025 COMPLETE CBC W/AUTO DIFF WBC: CPT

## 2021-03-05 PROCEDURE — 80048 BASIC METABOLIC PNL TOTAL CA: CPT

## 2021-03-05 NOTE — PRE-PROCEDURE INSTRUCTIONS
eviewed all pre procedure instructions wiht pt and pt will be npo after [place] and genia will drive her to [place] and will arrive here at 12n - [facility]

## 2021-03-09 ENCOUNTER — TELEPHONE (OUTPATIENT)
Dept: INTERVENTIONAL RADIOLOGY/VASCULAR | Facility: HOSPITAL | Age: 86
End: 2021-03-09

## 2021-03-09 ENCOUNTER — TELEPHONE (OUTPATIENT)
Dept: SURGERY | Facility: HOSPITAL | Age: 86
End: 2021-03-09

## 2021-03-09 ENCOUNTER — HOSPITAL ENCOUNTER (OUTPATIENT)
Dept: INTERVENTIONAL RADIOLOGY/VASCULAR | Facility: HOSPITAL | Age: 86
Setting detail: OUTPATIENT SURGERY
Discharge: HOME | End: 2021-03-09
Attending: SURGERY | Admitting: SURGERY
Payer: MEDICARE

## 2021-03-09 VITALS
SYSTOLIC BLOOD PRESSURE: 120 MMHG | RESPIRATION RATE: 18 BRPM | DIASTOLIC BLOOD PRESSURE: 68 MMHG | TEMPERATURE: 97.4 F | HEART RATE: 68 BPM | OXYGEN SATURATION: 96 %

## 2021-03-09 DIAGNOSIS — L97.909 ATHEROSCLEROSIS OF ARTERY OF EXTREMITY WITH ULCERATION (HCC): Primary | ICD-10-CM

## 2021-03-09 DIAGNOSIS — I70.299 ATHEROSCLEROSIS OF ARTERY OF EXTREMITY WITH ULCERATION (HCC): Primary | ICD-10-CM

## 2021-03-09 PROCEDURE — C1887 CATHETER, GUIDING: HCPCS

## 2021-03-09 PROCEDURE — C1725 CATH, TRANSLUMIN NON-LASER: HCPCS

## 2021-03-09 PROCEDURE — 99152 MOD SED SAME PHYS/QHP 5/>YRS: CPT

## 2021-03-09 PROCEDURE — C1730 CATH, EP, 19 OR FEW ELECT: HCPCS

## 2021-03-09 PROCEDURE — C1769 GUIDE WIRE: HCPCS

## 2021-03-09 PROCEDURE — 76937 US GUIDE VASCULAR ACCESS: CPT | Performed by: SURGERY

## 2021-03-09 PROCEDURE — 36247 INS CATH ABD/L-EXT ART 3RD: CPT

## 2021-03-09 PROCEDURE — C1874 STENT, COATED/COV W/DEL SYS: HCPCS

## 2021-03-09 PROCEDURE — C1894 INTRO/SHEATH, NON-LASER: HCPCS

## 2021-03-09 PROCEDURE — 37226 HB FEM/POPL REVASC W/STENT: CPT

## 2021-03-09 PROCEDURE — 99152 MOD SED SAME PHYS/QHP 5/>YRS: CPT | Performed by: SURGERY

## 2021-03-09 PROCEDURE — C1760 CLOSURE DEV, VASC: HCPCS

## 2021-03-09 PROCEDURE — 75710 ARTERY X-RAYS ARM/LEG: CPT | Performed by: SURGERY

## 2021-03-09 PROCEDURE — 99153 MOD SED SAME PHYS/QHP EA: CPT

## 2021-03-09 PROCEDURE — 75710 ARTERY X-RAYS ARM/LEG: CPT

## 2021-03-09 PROCEDURE — G9198 NO ORDER FOR CEPH NO REASON: HCPCS | Performed by: SURGERY

## 2021-03-09 PROCEDURE — 37226 PR REVASCULARIZE FEM/POP ARTERY,ANGIOPLASTY/STENT: CPT | Performed by: SURGERY

## 2021-03-09 RX ORDER — HEPARIN SODIUM 1000 [USP'U]/ML
INJECTION, SOLUTION INTRAVENOUS; SUBCUTANEOUS CODE/TRAUMA/SEDATION MEDICATION
Status: COMPLETED | OUTPATIENT
Start: 2021-03-09 | End: 2021-03-09

## 2021-03-09 RX ORDER — LIDOCAINE WITH 8.4% SOD BICARB 0.9%(10ML)
SYRINGE (ML) INJECTION CODE/TRAUMA/SEDATION MEDICATION
Status: COMPLETED | OUTPATIENT
Start: 2021-03-09 | End: 2021-03-09

## 2021-03-09 RX ORDER — FENTANYL CITRATE 50 UG/ML
INJECTION, SOLUTION INTRAMUSCULAR; INTRAVENOUS CODE/TRAUMA/SEDATION MEDICATION
Status: COMPLETED | OUTPATIENT
Start: 2021-03-09 | End: 2021-03-09

## 2021-03-09 RX ORDER — SODIUM CHLORIDE 9 MG/ML
100 INJECTION, SOLUTION INTRAVENOUS CONTINUOUS
Status: DISPENSED | OUTPATIENT
Start: 2021-03-09 | End: 2021-03-09

## 2021-03-09 RX ORDER — MIDAZOLAM HYDROCHLORIDE 2 MG/2ML
INJECTION, SOLUTION INTRAMUSCULAR; INTRAVENOUS CODE/TRAUMA/SEDATION MEDICATION
Status: COMPLETED | OUTPATIENT
Start: 2021-03-09 | End: 2021-03-09

## 2021-03-09 RX ORDER — ASPIRIN 81 MG/1
81 TABLET ORAL DAILY
Status: DISCONTINUED | OUTPATIENT
Start: 2021-03-10 | End: 2021-03-09 | Stop reason: HOSPADM

## 2021-03-09 RX ORDER — ASPIRIN 81 MG/1
81 TABLET, CHEWABLE ORAL DAILY
Status: DISCONTINUED | OUTPATIENT
Start: 2021-03-10 | End: 2021-03-09 | Stop reason: HOSPADM

## 2021-03-09 RX ADMIN — IODIXANOL 75 ML: 320 INJECTION, SOLUTION INTRAVASCULAR at 16:56

## 2021-03-09 RX ADMIN — FENTANYL CITRATE 25 MCG: 50 INJECTION, SOLUTION INTRAMUSCULAR; INTRAVENOUS at 13:17

## 2021-03-09 RX ADMIN — PROTAMINE SULFATE 20 MG: 10 INJECTION, SOLUTION INTRAVENOUS at 16:22

## 2021-03-09 RX ADMIN — Medication 5 ML: at 13:38

## 2021-03-09 RX ADMIN — HEPARIN SODIUM 1000 UNITS: 1000 INJECTION INTRAVENOUS; SUBCUTANEOUS at 15:56

## 2021-03-09 RX ADMIN — FENTANYL CITRATE 25 MCG: 50 INJECTION, SOLUTION INTRAMUSCULAR; INTRAVENOUS at 16:10

## 2021-03-09 RX ADMIN — MIDAZOLAM HYDROCHLORIDE 0.5 MG: 1 INJECTION, SOLUTION INTRAMUSCULAR; INTRAVENOUS at 14:30

## 2021-03-09 RX ADMIN — FENTANYL CITRATE 25 MCG: 50 INJECTION, SOLUTION INTRAMUSCULAR; INTRAVENOUS at 15:19

## 2021-03-09 RX ADMIN — HEPARIN SODIUM 5000 UNITS: 1000 INJECTION INTRAVENOUS; SUBCUTANEOUS at 14:05

## 2021-03-09 RX ADMIN — HEPARIN SODIUM 1000 UNITS: 1000 INJECTION INTRAVENOUS; SUBCUTANEOUS at 15:04

## 2021-03-09 RX ADMIN — MIDAZOLAM HYDROCHLORIDE 1 MG: 1 INJECTION, SOLUTION INTRAMUSCULAR; INTRAVENOUS at 13:33

## 2021-03-09 RX ADMIN — FENTANYL CITRATE 25 MCG: 50 INJECTION, SOLUTION INTRAMUSCULAR; INTRAVENOUS at 15:11

## 2021-03-09 RX ADMIN — MIDAZOLAM HYDROCHLORIDE 0.5 MG: 1 INJECTION, SOLUTION INTRAMUSCULAR; INTRAVENOUS at 13:50

## 2021-03-09 RX ADMIN — FENTANYL CITRATE 25 MCG: 50 INJECTION, SOLUTION INTRAMUSCULAR; INTRAVENOUS at 15:36

## 2021-03-09 RX ADMIN — HEPARIN SODIUM 1000 UNITS: 1000 INJECTION INTRAVENOUS; SUBCUTANEOUS at 15:13

## 2021-03-09 NOTE — INTERVAL H&P NOTE
H&P reviewed  After examining the patient I find no changes in the patients condition since the H&P had been written    Plan for right leg angiogram     Vitals:    03/09/21 1230   BP: 153/63   Pulse: 72   Resp: 16   Temp: 98 2 °F (36 8 °C)   SpO2: 96%

## 2021-03-09 NOTE — NURSING NOTE
Patient transferred to [facility]  Report given to [facility] over phone  Patient transferred to bed via sliding board  Assessed L femoral puncture site with [facility], site is clean, dry and intact with no signs of hematoma

## 2021-03-09 NOTE — BRIEF OP NOTE (RAD/CATH)
IR AORTAGRAM WITH RUN-OFF Procedure Note    PATIENT NAME: Lalo Maldonado  : 1935  MRN: [id number]    Pre-op Diagnosis:   1  Atherosclerosis of artery of extremity with ulceration (HCC)      Post-op Diagnosis:   1  Atherosclerosis of artery of extremity with ulceration [facility])        Surgeon:   Margarette Bloch, MD  Assistants:     No qualified resident was available, Resident is only observing    Estimated Blood Loss: minimal    Findings: heavily calcified Right SFA with few different lesions treated with [id number] Mildred  Distal SFA / pop had a separate lesions causing   This was treated with 6x60mm Mildred stent  Aortic bifurcation was heavily angulated and thus it was very difficult to cross up and over this bifurcation    Triphasic Right PT on completion      Specimens: none    Complications:  none    Anesthesia: conscious sedation    Margarette Bloch, MD     Date: 3/9/2021  Time: 4:47 PM

## 2021-03-09 NOTE — DISCHARGE INSTRUCTIONS
DISCHARGE INSTRUCTIONS-- ARTERIOGRAM/ANGIOPLASTY/STENT    Following discharge from the hospital, you may have some questions about your procedure, your activities or your general condition  These instructions may answer some of your questions and help you adjust during the first few weeks following your operation  ACTIVITY: The evening following the procedure you should be sure someone remains with you until the next morning  Rest as much as possible, sitting, lying or reclining  Use the stairs as little as possible  The following day, limit your activity to walking  Avoid stooping or heavy lifting (no more than 30 lbs) for the first three days  Walking up steps and normal activities may be resumed as you feel ready  You should not drive a car for at least two days following discharge from the hospital  You may ride in a car  If you have any questions regarding a particular activity, please discuss with your doctor or nurse before you are discharged  DIET:  Resume your normal diet  Try to eat low fat and low cholesterol foods  Drink more liquids than usual for the next 24 hours  INCISION: Your doctor may have chosen to use a type of adhesive glue, to close your incision  The glue is used to cover the incision, assist in closure, and prevent contamination  This adhesive will darken and peel away on its own within one to two weeks  You may shower after the procedure, but do not scrub the incision  Sitting in a tub is not recommended for the two days following the procedure or if you have any open wounds  It is normal to have some bruising, swelling or mild discoloration around the incision  IF increasing redness or pain develops, call our office immediately  If present, you may remove the band-aid or steri-strips over your wound after two days  If you notice any active bleeding at the site, apply pressure to the site and call our office ([phone])      FOLLOW UP STUDIES:  Your doctor will discuss whether further treatments or follow-up studies are necessary at your first post procedure visit  Please obtain blood test on Thursday for kidney function (BMP)    MEDICATIONS:  Resume eliquis on morning of 3/10/21  Start taking daily baby aspirin (81mg) in addition to eliquis  Restart Hydrocholothiazide-lisinopril on 3-    PLEASE CALL THE OFFICE IF YOU HAVE ANY QUESTIONS  [phone] [facility] [phone] [facility] FREE [phone]  [address] [facility] , [address], [place], [address]  [address], [address], [facility], [address]  [address], [place] [address] [place] [address]  [address], [address] [facility], [place], [address]    [address], [address], [address]  [address] [facility] [address]   [address] [facility], [address] [facility], [address]  [address], [address] [facility], Elaine Connolly [address]

## 2021-03-11 ENCOUNTER — APPOINTMENT (OUTPATIENT)
Dept: LAB | Facility: HOSPITAL | Age: 86
End: 2021-03-11
Attending: SURGERY
Payer: MEDICARE

## 2021-03-11 DIAGNOSIS — I70.299 ATHEROSCLEROSIS OF ARTERY OF EXTREMITY WITH ULCERATION (HCC): ICD-10-CM

## 2021-03-11 DIAGNOSIS — L97.909 ATHEROSCLEROSIS OF ARTERY OF EXTREMITY WITH ULCERATION (HCC): ICD-10-CM

## 2021-03-11 LAB
ANION GAP SERPL CALCULATED.3IONS-SCNC: 9 MMOL/L (ref 4–13)
BUN SERPL-MCNC: 23 MG/DL (ref 5–25)
CALCIUM SERPL-MCNC: 8.7 MG/DL (ref 8.3–10.1)
CHLORIDE SERPL-SCNC: 99 MMOL/L (ref 100–108)
CO2 SERPL-SCNC: 28 MMOL/L (ref 21–32)
CREAT SERPL-MCNC: 1.33 MG/DL (ref 0.6–1.3)
GFR SERPL CREATININE-BSD FRML MDRD: 36 ML/MIN/1.73SQ M
GLUCOSE P FAST SERPL-MCNC: 109 MG/DL (ref 65–99)
POTASSIUM SERPL-SCNC: 3.6 MMOL/L (ref 3.5–5.3)
SODIUM SERPL-SCNC: 136 MMOL/L (ref 136–145)

## 2021-03-11 PROCEDURE — 36415 COLL VENOUS BLD VENIPUNCTURE: CPT

## 2021-03-11 PROCEDURE — 80048 BASIC METABOLIC PNL TOTAL CA: CPT

## 2021-03-15 DIAGNOSIS — L97.909 ATHEROSCLEROSIS OF ARTERY OF EXTREMITY WITH ULCERATION (HCC): Primary | ICD-10-CM

## 2021-03-15 DIAGNOSIS — I70.299 ATHEROSCLEROSIS OF ARTERY OF EXTREMITY WITH ULCERATION (HCC): Primary | ICD-10-CM

## 2021-03-23 ENCOUNTER — APPOINTMENT (OUTPATIENT)
Dept: LAB | Facility: HOSPITAL | Age: 86
End: 2021-03-23
Attending: SURGERY
Payer: MEDICARE

## 2021-03-23 DIAGNOSIS — I70.299 ATHEROSCLEROSIS OF ARTERY OF EXTREMITY WITH ULCERATION (HCC): ICD-10-CM

## 2021-03-23 DIAGNOSIS — L97.909 ATHEROSCLEROSIS OF ARTERY OF EXTREMITY WITH ULCERATION (HCC): ICD-10-CM

## 2021-03-23 LAB
ANION GAP SERPL CALCULATED.3IONS-SCNC: 9 MMOL/L (ref 4–13)
BUN SERPL-MCNC: 16 MG/DL (ref 5–25)
CALCIUM SERPL-MCNC: 8.5 MG/DL (ref 8.3–10.1)
CHLORIDE SERPL-SCNC: 102 MMOL/L (ref 100–108)
CO2 SERPL-SCNC: 28 MMOL/L (ref 21–32)
CREAT SERPL-MCNC: 1.07 MG/DL (ref 0.6–1.3)
GFR SERPL CREATININE-BSD FRML MDRD: 47 ML/MIN/1.73SQ M
GLUCOSE SERPL-MCNC: 102 MG/DL (ref 65–140)
POTASSIUM SERPL-SCNC: 4.1 MMOL/L (ref 3.5–5.3)
SODIUM SERPL-SCNC: 139 MMOL/L (ref 136–145)

## 2021-03-23 PROCEDURE — 80048 BASIC METABOLIC PNL TOTAL CA: CPT

## 2021-03-23 PROCEDURE — 36415 COLL VENOUS BLD VENIPUNCTURE: CPT

## 2021-03-29 ENCOUNTER — OFFICE VISIT (OUTPATIENT)
Dept: VASCULAR SURGERY | Facility: CLINIC | Age: 86
End: 2021-03-29
Payer: MEDICARE

## 2021-03-29 VITALS
BODY MASS INDEX: 31.89 KG/M2 | HEIGHT: 63 IN | WEIGHT: 180 LBS | RESPIRATION RATE: 18 BRPM | HEART RATE: 72 BPM | SYSTOLIC BLOOD PRESSURE: 128 MMHG | DIASTOLIC BLOOD PRESSURE: 70 MMHG | TEMPERATURE: 97.8 F

## 2021-03-29 DIAGNOSIS — N18.31 STAGE 3A CHRONIC KIDNEY DISEASE (HCC): ICD-10-CM

## 2021-03-29 DIAGNOSIS — L97.909 ATHEROSCLEROSIS OF ARTERY OF EXTREMITY WITH ULCERATION (HCC): Primary | ICD-10-CM

## 2021-03-29 DIAGNOSIS — I70.299 ATHEROSCLEROSIS OF ARTERY OF EXTREMITY WITH ULCERATION (HCC): Primary | ICD-10-CM

## 2021-03-29 PROCEDURE — 99212 OFFICE O/P EST SF 10 MIN: CPT | Performed by: SURGERY

## 2021-03-29 RX ORDER — ASPIRIN 81 MG/1
81 TABLET ORAL DAILY
COMMUNITY

## 2021-03-29 NOTE — ASSESSMENT & PLAN NOTE
Underwent right leg SFA and pop angioplasty and stent with excellent flow in the right foot  Rest pain has resolved  Toe ulcer has healed  Continue daily ELiquis  Follow up LEAD in 3 months  Take aspirin for 1 year post stent

## 2021-03-29 NOTE — ASSESSMENT & PLAN NOTE
Lab Results   Component Value Date    EGFR 47 03/23/2021    EGFR 36 03/11/2021    EGFR 49 03/05/2021    CREATININE 1 07 03/23/2021    CREATININE 1 33 (H) 03/11/2021    CREATININE 1 04 03/05/2021       eGFR improved now from 36 to 49  Drink plenty of water daily

## 2021-03-29 NOTE — PROGRESS NOTES
Assessment/Plan:    Atherosclerosis of artery of extremity with ulceration (HCC)  Underwent right leg SFA and pop angioplasty and stent with excellent flow in the right foot  Rest pain has resolved  Toe ulcer has healed  Continue daily ELiquis  Follow up LEAD in 3 months  Take aspirin for 1 year post stent  Stage 3a chronic kidney disease  Lab Results   Component Value Date    EGFR 47 03/23/2021    EGFR 36 03/11/2021    EGFR 49 03/05/2021    CREATININE 1 07 03/23/2021    CREATININE 1 33 (H) 03/11/2021    CREATININE 1 04 03/05/2021       eGFR improved now from 36 to 49  Drink plenty of water daily  Diagnoses and all orders for this visit:    Atherosclerosis of artery of extremity with ulceration (HCC)  -     VAS lower limb arterial duplex, limited/unilateral; Future    Stage 3a chronic kidney disease    Other orders  -     aspirin (ECOTRIN LOW STRENGTH) 81 mg EC tablet; Take 81 mg by mouth daily          Subjective:      Patient ID: Margo Lynch is a 80 y o  female  Patient presents in office to review aortagram w/ run off done 3/9/2021  HPI  Patient underwent Right leg angioplasty and stent with good results  Rest pain and wound have resolved  Left groin site is soft and well healed  Denies any left leg pain  She is now taking Eliquis and aspirin  Kidney function is now normal     The following portions of the patient's history were reviewed and updated as appropriate: allergies, current medications, past family history, past medical history, past social history, past surgical history and problem list     Review of Systems   Constitutional: Negative  Negative for chills and fever  HENT: Negative  Negative for trouble swallowing  Eyes: Negative  Respiratory: Negative  Negative for cough, chest tightness and shortness of breath  Cardiovascular: Negative  Negative for leg swelling  Gastrointestinal: Negative  Negative for diarrhea, nausea and vomiting  Endocrine: Negative  Genitourinary: Negative  Musculoskeletal: Negative  Negative for gait problem  Skin: Negative  Negative for wound  Allergic/Immunologic: Negative  Neurological: Negative  Negative for dizziness, speech difficulty, weakness, numbness and headaches  Hematological: Negative  Does not bruise/bleed easily  Psychiatric/Behavioral: Negative  Negative for self-injury and suicidal ideas  I have reviewed the ROS as entered and made changes as necessary  Objective:      /70 (BP Location: Right arm, Patient Position: Sitting, Cuff Size: Adult)   Pulse 72   Temp 97 8 °F (36 6 °C) (Tympanic)   Resp 18   Ht 5' 3" (1 6 m)   Wt 81 6 kg (180 lb)   BMI 31 89 kg/m²          Physical Exam  Vitals signs and nursing note reviewed  Constitutional:       Appearance: Normal appearance  She is obese  HENT:      Head: Normocephalic and atraumatic  Cardiovascular:      Rate and Rhythm: Normal rate  Rhythm irregular  Comments: tripahsic dopplers on the right DP and PT   Abdominal:      Palpations: Abdomen is soft  Comments: Left groin soft, no hematoma  Musculoskeletal:         General: No swelling, tenderness, deformity or signs of injury  Skin:     General: Skin is warm and dry  Capillary Refill: Capillary refill takes less than 2 seconds  Comments: Right foot wound healed  Neurological:      General: No focal deficit present  Mental Status: She is alert and oriented to person, place, and time     Psychiatric:         Mood and Affect: Mood normal          Behavior: Behavior normal

## 2021-07-12 ENCOUNTER — HOSPITAL ENCOUNTER (OUTPATIENT)
Dept: NON INVASIVE DIAGNOSTICS | Facility: CLINIC | Age: 86
Discharge: HOME | End: 2021-07-12
Payer: MEDICARE

## 2021-07-12 DIAGNOSIS — I70.299 ATHEROSCLEROSIS OF ARTERY OF EXTREMITY WITH ULCERATION (HCC): ICD-10-CM

## 2021-07-12 DIAGNOSIS — L97.909 ATHEROSCLEROSIS OF ARTERY OF EXTREMITY WITH ULCERATION (HCC): ICD-10-CM

## 2021-07-12 PROCEDURE — 93926 LOWER EXTREMITY STUDY: CPT

## 2021-07-14 PROCEDURE — 93926 LOWER EXTREMITY STUDY: CPT | Performed by: SURGERY

## 2021-07-14 PROCEDURE — 93922 UPR/L XTREMITY ART 2 LEVELS: CPT | Performed by: SURGERY

## 2021-08-17 ENCOUNTER — TELEPHONE (OUTPATIENT)
Dept: CARDIOLOGY CLINIC | Facility: CLINIC | Age: 86
End: 2021-08-17

## 2021-08-17 NOTE — TELEPHONE ENCOUNTER
Received letter in mail for patient to hold eliquis for a tooth extraction     Signed and faxed back to number on letter: [phone]

## 2022-05-11 ENCOUNTER — OFFICE VISIT (OUTPATIENT)
Dept: CARDIOLOGY CLINIC | Facility: CLINIC | Age: 87
End: 2022-05-11
Payer: MEDICARE

## 2022-05-11 VITALS
OXYGEN SATURATION: 96 % | SYSTOLIC BLOOD PRESSURE: 124 MMHG | HEART RATE: 97 BPM | BODY MASS INDEX: 32.03 KG/M2 | DIASTOLIC BLOOD PRESSURE: 64 MMHG | WEIGHT: 180.8 LBS

## 2022-05-11 DIAGNOSIS — E78.2 COMBINED HYPERLIPIDEMIA: ICD-10-CM

## 2022-05-11 DIAGNOSIS — I48.0 PAROXYSMAL A-FIB (HCC): Primary | ICD-10-CM

## 2022-05-11 DIAGNOSIS — Z79.01 CHRONIC ANTICOAGULATION: ICD-10-CM

## 2022-05-11 DIAGNOSIS — I10 HYPERTENSION, ESSENTIAL: ICD-10-CM

## 2022-05-11 PROCEDURE — 99213 OFFICE O/P EST LOW 20 MIN: CPT | Performed by: INTERNAL MEDICINE

## 2022-05-11 RX ORDER — SODIUM FLUORIDE 6.1 MG/ML
PASTE, DENTIFRICE DENTAL
COMMUNITY
Start: 2022-02-22

## 2022-05-11 RX ORDER — GABAPENTIN 300 MG/1
300 CAPSULE ORAL
COMMUNITY
Start: 2022-04-28

## 2022-05-11 NOTE — PROGRESS NOTES
[facility] [address] [facility] [id number]  Cardiology Follow Up    Nathan Reina  1935  [id number]      1  Paroxysmal A-fib ([facility] )     2  Chronic anticoagulation     3  Hypertension, essential     4  Combined hyperlipidemia         Chief Complaint   Patient presents with    Follow-up     Routine follow up       Interval History:  Patient presents for follow-up visit  Patient denies any history of chest pain shortness of breath  Patient denies any history of leg edema or orthopnea PND  No history of presyncope syncope  Patient states compliance with the present list of medications  Patient denies any bleeding issues      Patient Active Problem List   Diagnosis    Paroxysmal A-fib (HCC)    Chronic anticoagulation    Hypertension, essential    Combined hyperlipidemia    History of left knee replacement    Atherosclerosis of artery of extremity with ulceration (HCC)    Stage 3a chronic kidney disease (HCC)     Past Medical History:   Diagnosis Date    PMR (polymyalgia rheumatica) (HCC)     Stroke ([place] [address] )      Social History     Socioeconomic History    Marital status: /Civil Union     Spouse name: Not on file    Number of children: Not on file    Years of education: Not on file    Highest education level: Not on file   Occupational History    Not on file   Tobacco Use    Smoking status: Never Smoker    Smokeless tobacco: Never Used   Vaping Use    Vaping Use: Never used   Substance and Sexual Activity    Alcohol use: No    Drug use: No    Sexual activity: Not on file   Other Topics Concern    Not on file   Social History Narrative    Not on file     Social Determinants of Health     Financial Resource Strain: Not on file   Food Insecurity: Not on file   Transportation Needs: Not on file   Physical Activity: Not on file   Stress: Not on file   Social Connections: Not on file   Intimate Partner Violence: Not on file   Housing Stability: Not on file      History reviewed  No pertinent family history  Past Surgical History:   Procedure Laterality Date    IR AORTAGRAM WITH RUN-OFF  3/9/2021    REPLACEMENT TOTAL KNEE         Current Outpatient Medications:     aspirin (ECOTRIN LOW STRENGTH) 81 mg EC tablet, Take 81 mg by mouth daily, Disp: , Rfl:     atorvastatin (LIPITOR) 20 mg tablet, Take 1 tablet by mouth daily, Disp: , Rfl:     ELIQUIS 5 MG, Take 5 mg by mouth 2 (two) times a day, Disp: , Rfl: 3    fluticasone (FLONASE) 50 mcg/act nasal spray, SPRAY 2 SPRAYS INTO EACH NOSTRIL EVERY DAY, Disp: , Rfl:     gabapentin (NEURONTIN) 300 mg capsule, Take 300 mg by mouth daily at bedtime, Disp: , Rfl:     lisinopril-hydrochlorothiazide (PRINZIDE,ZESTORETIC) 20-12 5 MG per tablet, Take 0 5 tablets by mouth daily, Disp: , Rfl:     loratadine (CLARITIN) 10 mg tablet, Take 10 mg by mouth daily, Disp: , Rfl: 5    metoprolol succinate (TOPROL-XL) 25 mg 24 hr tablet, Take 1 tablet (25 mg total) by mouth daily Take 1 tablet daily, Disp: , Rfl:     predniSONE 10 mg tablet, Take 5 mg by mouth daily , Disp: , Rfl:     Sennosides (LAXATIVE PILLS PO), Take 1 tablet by mouth Twice daily, Disp: , Rfl:     Sodium Fluoride 5000 PPM 1 1 % GEL, BRUSH TWICE DAILY, Disp: , Rfl:     traMADol (ULTRAM) 50 mg tablet, TAKE 1 TAB BY MOUTH 2 TIMES A DAY AS NEEDED FOR PAIN, SEVERE , Disp: , Rfl:     acetaminophen-codeine (TYLENOL #3) 300-30 mg per tablet, , Disp: , Rfl:   Allergies   Allergen Reactions    Hydrocodone     Oxycodone Anxiety and Headache       Labs:  No visits with results within 2 Month(s) from this visit     Latest known visit with results is:   Appointment on 03/23/2021   Component Date Value    Sodium 03/23/2021 139     Potassium 03/23/2021 4 1     Chloride 03/23/2021 102     CO2 03/23/2021 28     ANION GAP 03/23/2021 9     BUN 03/23/2021 16     Creatinine 03/23/2021 1 07     Glucose 03/23/2021 102     Calcium 03/23/2021 8 5     eGFR 03/23/2021 47      Imaging: No results found  Review of Systems:  Review of Systems   REVIEW OF SYSTEMS:  Constitutional:  Denies fever or chills   Eyes:  Denies change in visual acuity   HENT:  Denies nasal congestion or sore throat   Respiratory:  Denies cough or shortness of breath   Cardiovascular:  Denies chest pain or edema   GI:  Denies abdominal pain, nausea, vomiting, bloody stools or diarrhea   :  Denies dysuria, frequency, difficulty in micturition and nocturia  Musculoskeletal:  Denies back pain or joint pain   Neurologic:  Denies headache, focal weakness or sensory changes   Endocrine:  Denies polyuria or polydipsia   Lymphatic:  Denies swollen glands   Psychiatric:  Denies depression or anxiety     Physical Exam:    /64 (BP Location: Left arm, Patient Position: Sitting, Cuff Size: Standard)   Pulse 97   Wt 82 kg (180 lb 12 8 oz)   SpO2 96%   BMI 32 03 kg/m²     Physical Exam   PHYSICAL EXAM:  General:  Patient is not in acute distress   Head: Normocephalic, Atraumatic  HEENT:  Both pupils normal-size atraumatic, normocephalic, nonicteric  Neck:  JVP not raised  Trachea central  No carotid bruit  Respiratory:  normal breath sounds no crackles  no rhonchi  Cardiovascular:  Irregularly irregular  GI:  Abdomen soft nontender  No organomegaly  Lymphatic:  No cervical or inguinal lymphadenopathy  Neurologic:  Patient is awake alert, oriented   Grossly nonfocal  Extremities no edema    Discussion/Summary:  Patient with multiple medical problems who seems to be doing reasonably well from cardiac standpoint  Previous studies reviewed with patient  Medications reviewed and possible side effects discussed  concepts of cardiovascular disease , signs and symptoms of heart disease  Dietary and risk factor modification reinforced  All questions answered  Safety measures reviewed  Patient advised to report any problems prompting medical attention      Risks and benefits  and alternativesof anticoagulation to prevent thromboembolic risk from atrial fibrillation discussed at length  Patient to report any bleeding issues  Medications reviewed  Follow-up in 6 months  Follow-up with primary care physician

## 2022-05-27 ENCOUNTER — HOSPITAL ENCOUNTER (OUTPATIENT)
Dept: NON INVASIVE DIAGNOSTICS | Facility: CLINIC | Age: 87
Discharge: HOME | End: 2022-05-27
Payer: MEDICARE

## 2022-05-27 DIAGNOSIS — L97.909 ATHEROSCLEROSIS OF ARTERY OF EXTREMITY WITH ULCERATION (HCC): ICD-10-CM

## 2022-05-27 DIAGNOSIS — I70.299 ATHEROSCLEROSIS OF ARTERY OF EXTREMITY WITH ULCERATION (HCC): ICD-10-CM

## 2022-05-27 PROCEDURE — 93926 LOWER EXTREMITY STUDY: CPT

## 2022-05-27 PROCEDURE — 93922 UPR/L XTREMITY ART 2 LEVELS: CPT | Performed by: SURGERY

## 2022-05-27 PROCEDURE — 93926 LOWER EXTREMITY STUDY: CPT | Performed by: SURGERY

## 2022-05-31 ENCOUNTER — TRANSCRIBE ORDERS (OUTPATIENT)
Dept: VASCULAR SURGERY | Facility: CLINIC | Age: 87
End: 2022-05-31

## 2022-05-31 DIAGNOSIS — L97.909 ATHEROSCLEROSIS OF ARTERY OF EXTREMITY WITH ULCERATION (HCC): Primary | ICD-10-CM

## 2022-05-31 DIAGNOSIS — I70.299 ATHEROSCLEROSIS OF ARTERY OF EXTREMITY WITH ULCERATION (HCC): Primary | ICD-10-CM

## 2022-07-19 ENCOUNTER — OFFICE VISIT (OUTPATIENT)
Dept: VASCULAR SURGERY | Facility: CLINIC | Age: 87
End: 2022-07-19
Payer: MEDICARE

## 2022-07-19 VITALS
SYSTOLIC BLOOD PRESSURE: 124 MMHG | DIASTOLIC BLOOD PRESSURE: 80 MMHG | BODY MASS INDEX: 31.89 KG/M2 | HEART RATE: 100 BPM | TEMPERATURE: 98.2 F | WEIGHT: 180 LBS | HEIGHT: 63 IN

## 2022-07-19 DIAGNOSIS — I10 HYPERTENSION, ESSENTIAL: ICD-10-CM

## 2022-07-19 DIAGNOSIS — Z79.01 CHRONIC ANTICOAGULATION: ICD-10-CM

## 2022-07-19 DIAGNOSIS — E78.2 COMBINED HYPERLIPIDEMIA: ICD-10-CM

## 2022-07-19 DIAGNOSIS — N18.31 STAGE 3A CHRONIC KIDNEY DISEASE (HCC): ICD-10-CM

## 2022-07-19 DIAGNOSIS — I70.203 ATHEROSCLEROSIS OF ARTERY OF BOTH LOWER EXTREMITIES (HCC): Primary | ICD-10-CM

## 2022-07-19 PROCEDURE — 99213 OFFICE O/P EST LOW 20 MIN: CPT | Performed by: PHYSICIAN ASSISTANT

## 2022-07-19 NOTE — PROGRESS NOTES
Assessment/Plan:    Atherosclerosis of artery of both lower extremities (HCC)  -     VAS lower limb arterial duplex, complete bilateral; Future    -no new or worsening leg sx; no ischemic rest pain or wounds  -chronic R foot neuropathy  -L lateral leg discomfort when she palpates / massages the leg and can worsen with activity which I asked her to watch    -DANTE 5/27/22    R NC/52/48, patent SFA and popl stents, 50-75% prox SFA  L NC/34/9    No progression of disease    Plan: We reviewed her lower extremity arterial duplex study which looks stable and it is essentially unchanged from last year  The previously placed right SFA and popliteal stents remain patent  There is a 50-75% stenosis of the proximal R SFA which was seen last year     -continue with aspirin, Eliquis (AF) and atorvastatin   -maintain good blood pressure and cholesterol control  -check feet daily for any wounds or changes  -follow up DANTE in 1 year with office visit  -we discussed reasons to be seen sooner       Additional dx:    Chronic anticoagulation    Combined hyperlipidemia    Hypertension, essential    Stage 3a chronic kidney disease ([facility] )        Subjective:      Patient ID: Fabian Hernandez is a 80 y o  female  Patient presents today to review DANTE s/p RLE SFA and pop angioplasty w/ stenting on 3/29/21  Patient states that she does not do a whole lot of walking  Denies claudication symptoms  No wounds  HPI     Canones Sean 80 y/oF L TKR ('17), lumbar disc disease/spinal fusion ('15) Htn, HLD, paroxAF on apixaban, CKD 3a, Hx PMR, "mini-stroke", PAD s/p right LE angioplasty and stenting  She underwent right lower extremity procedures in March of 2021 for rest pain and great toe wound which was able to heal after revascularization  7/19/22:  Patient states the right leg stents are working " She generally walks around the house fairly comfortably and 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 LL TKR   -"Stents working"        DANTE 5/27/22  Indications:  6 month surveillance for progression of disease in the right lower extremity  s/p intervention  Patient reports left lower extremity cramping  Operative History:  2021-03-29 Right SFA and popliteal artery angioplasty and stent  Risk Factors  The patient has history of HTN, Hyperlipidemia, CKD, PAD, AFIB and CVA  Clinical  Right Pressure: 108/ mm Hg, Left Pressure: 107/ mm Hg  FINDINGS:     Segment                Right                             Left                                            Impression  Waveform  PSV (cm/s)  Waveform      Post  Tibial                       Biphasic              Monophasic    Ant  Tibial                        Biphasic              Monophasic    Common Femoral Artery                                83                Prox Profunda                                        69                Prox SFA - Stent       50-75%                       139                Mid SFA                                              75                Dist SFA - Stent                                     61                Proximal Pop - Stent                                 76                Distal Pop                                           40                Tibioperoneal                                        30                Dist Post Tibial                                     94                Prox  Ant   Tibial [address]  Tibial                                    63                Dist Peroneal                                        23                         CONCLUSION:     Impression:     RIGHT LOWER LIMB:  Evidence of a widely patent superficial femoral and popliteal arteries and  stents  There is a 50-75% stenosis of the proximal superficial femoral artery  Ankle/Brachial index: Non-compressible, (Prior: Non-compressible)  Metatarsal pressure of 52 mm Hg, (Prior: 145 mm Hg)  Great toe pressure of 48 mm Hg, within the healing range, (Prior: 110 mm Hg)  PVR/ PPG tracings are mildly dampened  LEFT LOWER LIMB:  Ankle/Brachial index: Non-compressible, (Prior: Non-compressible)  Metatarsal pressure of 34 mm Hg, (Prior: 26 mm Hg)  Great toe pressure of 9 mm Hg, within the healing range, (Prior: 6 mm Hg)  PVR/ PPG tracings are dampened  In comparison to the study on 7/12/2021, there is no significant progression of  disease  The following portions of the patient's history were reviewed and updated as appropriate: allergies, current medications, past family history, past medical history, past social history, past surgical history and problem list     Review of Systems   Constitutional: Negative  HENT: Positive for hearing loss  Eyes: Negative  Respiratory: Negative  Cardiovascular: Negative  Gastrointestinal: Negative  Endocrine: Negative  Genitourinary: Negative  Musculoskeletal: Positive for gait problem  Skin: Negative  Allergic/Immunologic: Negative  Neurological: Positive for weakness  Hematological: Bruises/bleeds easily  Psychiatric/Behavioral: Negative  Objective:      /80 (BP Location: Right arm, Patient Position: Sitting)   Pulse 100   Temp 98 2 °F (36 8 °C)   Ht 5' 3" (1 6 m)   Wt 81 6 kg (180 lb)   BMI 31 89 kg/m²      Physical Exam  Vitals and nursing note reviewed     Constitutional: Appearance: She is well-developed  HENT:      Head: Normocephalic and atraumatic  Eyes:      Pupils: Pupils are equal, round, and reactive to light  Neck:      Thyroid: No thyromegaly  Vascular: No JVD  Trachea: Trachea normal    Cardiovascular:      Rate and Rhythm: Normal rate  Rhythm irregular  Pulses:           Carotid pulses are 2+ on the right side and 2+ on the left side  Radial pulses are 2+ on the right side and 2+ on the left side  Heart sounds: Normal heart sounds, S1 normal and S2 normal  No murmur heard  No friction rub  No gallop  Comments:     No foot wounds or lesions  Feet are warm with normal cap refill    Pulmonary:      Effort: Pulmonary effort is normal  No accessory muscle usage or respiratory distress  Breath sounds: Normal breath sounds  No wheezing or rales  Abdominal:      General: Bowel sounds are normal  There is no distension  Palpations: Abdomen is soft  Tenderness: There is no abdominal tenderness  Musculoskeletal:         General: No deformity  Normal range of motion  Cervical back: Neck supple  Skin:     General: Skin is warm and dry  Findings: No lesion or rash  Nails: There is no clubbing  Neurological:      Mental Status: She is alert and oriented to person, place, and time  Comments: Grossly normal    Psychiatric:         Behavior: Behavior is cooperative  I have reviewed and made appropriate changes to the review of systems input by the medical assistant      Vitals:    07/19/22 1345   BP: 124/80   BP Location: Right arm   Patient Position: Sitting   Pulse: 100   Temp: 98 2 °F (36 8 °C)   Weight: 81 6 kg (180 lb)   Height: 5' 3" (1 6 m)       Patient Active Problem List   Diagnosis    Paroxysmal A-fib (HCC)    Chronic anticoagulation    Hypertension, essential    Combined hyperlipidemia    History of left knee replacement    Atherosclerosis of artery of extremity with ulceration ([facility] )    [facility]       Past Surgical History:   Procedure Laterality Date    IR AORTAGRAM WITH RUN-OFF  3/9/2021    REPLACEMENT TOTAL KNEE         History reviewed  No pertinent family history      Social History     Socioeconomic History    Marital status: /Civil Union     Spouse name: Not on file    Number of children: Not on file    Years of education: Not on file    Highest education level: Not on file   Occupational History    Not on file   Tobacco Use    Smoking status: Never Smoker    Smokeless tobacco: Never Used   Vaping Use    Vaping Use: Never used   Substance and Sexual Activity    Alcohol use: No    Drug use: No    Sexual activity: Not on file   Other Topics Concern    Not on file   Social History Narrative    Not on file     Social Determinants of Health     Financial Resource Strain: Not on file   Food Insecurity: Not on file   Transportation Needs: Not on file   Physical Activity: Not on file   Stress: Not on file   Social Connections: Not on file   Intimate Partner Violence: Not on file   Housing Stability: Not on file       Allergies   Allergen Reactions    Hydrocodone     Oxycodone Anxiety and Headache         Current Outpatient Medications:     aspirin (ECOTRIN LOW STRENGTH) 81 mg EC tablet, Take 81 mg by mouth daily, Disp: , Rfl:     atorvastatin (LIPITOR) 20 mg tablet, Take 1 tablet by mouth daily, Disp: , Rfl:     ELIQUIS 5 MG, Take 5 mg by mouth 2 (two) times a day, Disp: , Rfl: 3    fluticasone (FLONASE) 50 mcg/act nasal spray, SPRAY 2 SPRAYS INTO EACH NOSTRIL EVERY DAY, Disp: , Rfl:     gabapentin (NEURONTIN) 300 mg capsule, Take 300 mg by mouth daily at bedtime, Disp: , Rfl:     lisinopril-hydrochlorothiazide (PRINZIDE,ZESTORETIC) 20-12 5 MG per tablet, Take 0 5 tablets by mouth daily, Disp: , Rfl:     loratadine (CLARITIN) 10 mg tablet, Take 10 mg by mouth daily, Disp: , Rfl: 5    metoprolol succinate (TOPROL-XL) 25 mg 24 hr tablet, Take 1 tablet (25 mg total) by mouth daily Take 1 tablet daily, Disp: , Rfl:     predniSONE 10 mg tablet, Take 5 mg by mouth daily , Disp: , Rfl:     Sennosides (LAXATIVE PILLS PO), Take 1 tablet by mouth Twice daily, Disp: , Rfl:     Sodium Fluoride 5000 PPM 1 1 % GEL, BRUSH TWICE DAILY, Disp: , Rfl:     traMADol (ULTRAM) 50 mg tablet, TAKE 1 TAB BY MOUTH 2 TIMES A DAY AS NEEDED FOR PAIN, SEVERE , Disp: , Rfl:     acetaminophen-codeine (TYLENOL #3) 300-30 mg per tablet, , Disp: , Rfl:

## 2022-07-19 NOTE — PATIENT INSTRUCTIONS
Atherosclerosis of artery of both lower extremities ([facility] )      -continue with aspirin, Eliquis (AF) and atorvastatin   -check feet daily for any wounds or changes  -follow up DANTE in 1 year with office visit   -Call or return sooner for increased pain in your legs; constant numbness, tingling or coldness in the legs; or if you develop wounds on your toes/feet that do not heal

## 2023-05-24 ENCOUNTER — HOSPITAL ENCOUNTER (OUTPATIENT)
Dept: NON INVASIVE DIAGNOSTICS | Facility: CLINIC | Age: 88
Discharge: HOME | End: 2023-05-24

## 2023-05-24 DIAGNOSIS — I70.203 ATHEROSCLEROSIS OF ARTERY OF BOTH LOWER EXTREMITIES (HCC): ICD-10-CM

## 2023-08-03 ENCOUNTER — APPOINTMENT (EMERGENCY)
Dept: RADIOLOGY | Facility: HOSPITAL | Age: 88
DRG: 309 | End: 2023-08-03
Payer: MEDICARE

## 2023-08-03 ENCOUNTER — APPOINTMENT (EMERGENCY)
Dept: CT IMAGING | Facility: HOSPITAL | Age: 88
DRG: 309 | End: 2023-08-03
Payer: MEDICARE

## 2023-08-03 ENCOUNTER — HOSPITAL ENCOUNTER (INPATIENT)
Facility: HOSPITAL | Age: 88
LOS: 1 days | Discharge: ANOTHER HOSPITAL | DRG: 309 | End: 2023-08-04
Attending: EMERGENCY MEDICINE | Admitting: INTERNAL MEDICINE
Payer: MEDICARE

## 2023-08-03 DIAGNOSIS — R55 SYNCOPE AND COLLAPSE: Primary | ICD-10-CM

## 2023-08-03 DIAGNOSIS — S00.91XA ABRASION OF HEAD, INITIAL ENCOUNTER: ICD-10-CM

## 2023-08-03 DIAGNOSIS — E87.1 HYPONATREMIA: ICD-10-CM

## 2023-08-03 DIAGNOSIS — I45.5 SINUS PAUSE: ICD-10-CM

## 2023-08-03 DIAGNOSIS — S09.90XA INJURY OF HEAD, INITIAL ENCOUNTER: ICD-10-CM

## 2023-08-03 PROBLEM — J34.89 SINUS PAIN: Status: ACTIVE | Noted: 2023-08-03

## 2023-08-03 LAB
2HR DELTA HS TROPONIN: -1 NG/L
4HR DELTA HS TROPONIN: 0 NG/L
ANION GAP SERPL CALCULATED.3IONS-SCNC: 9 MMOL/L
ATRIAL RATE: 77 BPM
BASOPHILS # BLD AUTO: 0.04 THOUSANDS/ÂΜL (ref 0–0.1)
BASOPHILS NFR BLD AUTO: 0 % (ref 0–1)
BUN SERPL-MCNC: 23 MG/DL (ref 5–25)
CALCIUM SERPL-MCNC: 9.6 MG/DL (ref 8.4–10.2)
CARDIAC TROPONIN I PNL SERPL HS: 22 NG/L
CARDIAC TROPONIN I PNL SERPL HS: 23 NG/L
CARDIAC TROPONIN I PNL SERPL HS: 23 NG/L
CHLORIDE SERPL-SCNC: 94 MMOL/L (ref 96–108)
CO2 SERPL-SCNC: 25 MMOL/L (ref 21–32)
CREAT SERPL-MCNC: 1.05 MG/DL (ref 0.6–1.3)
EOSINOPHIL # BLD AUTO: 0.02 THOUSAND/ÂΜL (ref 0–0.61)
EOSINOPHIL NFR BLD AUTO: 0 % (ref 0–6)
ERYTHROCYTE [DISTWIDTH] IN BLOOD BY AUTOMATED COUNT: 13.1 % (ref 11.6–15.1)
GFR SERPL CREATININE-BSD FRML MDRD: 47 ML/MIN/1.73SQ M
GLUCOSE SERPL-MCNC: 116 MG/DL (ref 65–140)
HCT VFR BLD AUTO: 42.5 % (ref 34.8–46.1)
HGB BLD-MCNC: 14.4 G/DL (ref 11.5–15.4)
IMM GRANULOCYTES # BLD AUTO: 0.05 THOUSAND/UL (ref 0–0.2)
IMM GRANULOCYTES NFR BLD AUTO: 1 % (ref 0–2)
LYMPHOCYTES # BLD AUTO: 0.54 THOUSANDS/ÂΜL (ref 0.6–4.47)
LYMPHOCYTES NFR BLD AUTO: 5 % (ref 14–44)
MCH RBC QN AUTO: 30.8 PG (ref 26.8–34.3)
MCHC RBC AUTO-ENTMCNC: 33.9 G/DL (ref 31.4–37.4)
MCV RBC AUTO: 91 FL (ref 82–98)
MONOCYTES # BLD AUTO: 0.51 THOUSAND/ÂΜL (ref 0.17–1.22)
MONOCYTES NFR BLD AUTO: 5 % (ref 4–12)
NEUTROPHILS # BLD AUTO: 9.51 THOUSANDS/ÂΜL (ref 1.85–7.62)
NEUTS SEG NFR BLD AUTO: 89 % (ref 43–75)
NRBC BLD AUTO-RTO: 0 /100 WBCS
P AXIS: 218 DEGREES
PLATELET # BLD AUTO: 207 THOUSANDS/UL (ref 149–390)
PMV BLD AUTO: 10 FL (ref 8.9–12.7)
POTASSIUM SERPL-SCNC: 4.5 MMOL/L (ref 3.5–5.3)
PR INTERVAL: 104 MS
QRS AXIS: -17 DEGREES
QRSD INTERVAL: 102 MS
QT INTERVAL: 406 MS
QTC INTERVAL: 459 MS
RBC # BLD AUTO: 4.67 MILLION/UL (ref 3.81–5.12)
SODIUM SERPL-SCNC: 128 MMOL/L (ref 135–147)
T WAVE AXIS: 79 DEGREES
TSH SERPL DL<=0.05 MIU/L-ACNC: 1.32 UIU/ML (ref 0.45–4.5)
VENTRICULAR RATE: 77 BPM
WBC # BLD AUTO: 10.67 THOUSAND/UL (ref 4.31–10.16)

## 2023-08-03 PROCEDURE — 72125 CT NECK SPINE W/O DYE: CPT

## 2023-08-03 PROCEDURE — 85025 COMPLETE CBC W/AUTO DIFF WBC: CPT | Performed by: EMERGENCY MEDICINE

## 2023-08-03 PROCEDURE — 80048 BASIC METABOLIC PNL TOTAL CA: CPT | Performed by: EMERGENCY MEDICINE

## 2023-08-03 PROCEDURE — 96360 HYDRATION IV INFUSION INIT: CPT

## 2023-08-03 PROCEDURE — 70450 CT HEAD/BRAIN W/O DYE: CPT

## 2023-08-03 PROCEDURE — 84443 ASSAY THYROID STIM HORMONE: CPT | Performed by: INTERNAL MEDICINE

## 2023-08-03 PROCEDURE — 73564 X-RAY EXAM KNEE 4 OR MORE: CPT

## 2023-08-03 PROCEDURE — 90471 IMMUNIZATION ADMIN: CPT

## 2023-08-03 PROCEDURE — 99285 EMERGENCY DEPT VISIT HI MDM: CPT | Performed by: EMERGENCY MEDICINE

## 2023-08-03 PROCEDURE — 93005 ELECTROCARDIOGRAM TRACING: CPT

## 2023-08-03 PROCEDURE — 99285 EMERGENCY DEPT VISIT HI MDM: CPT

## 2023-08-03 PROCEDURE — 84484 ASSAY OF TROPONIN QUANT: CPT | Performed by: EMERGENCY MEDICINE

## 2023-08-03 PROCEDURE — 99223 1ST HOSP IP/OBS HIGH 75: CPT | Performed by: INTERNAL MEDICINE

## 2023-08-03 PROCEDURE — 86618 LYME DISEASE ANTIBODY: CPT | Performed by: INTERNAL MEDICINE

## 2023-08-03 PROCEDURE — 36415 COLL VENOUS BLD VENIPUNCTURE: CPT | Performed by: EMERGENCY MEDICINE

## 2023-08-03 PROCEDURE — 71260 CT THORAX DX C+: CPT

## 2023-08-03 PROCEDURE — G1004 CDSM NDSC: HCPCS

## 2023-08-03 PROCEDURE — 71045 X-RAY EXAM CHEST 1 VIEW: CPT

## 2023-08-03 PROCEDURE — 99497 ADVNCD CARE PLAN 30 MIN: CPT | Performed by: INTERNAL MEDICINE

## 2023-08-03 PROCEDURE — 90715 TDAP VACCINE 7 YRS/> IM: CPT | Performed by: EMERGENCY MEDICINE

## 2023-08-03 RX ORDER — ACETAMINOPHEN 325 MG/1
650 TABLET ORAL EVERY 6 HOURS PRN
Status: DISCONTINUED | OUTPATIENT
Start: 2023-08-03 | End: 2023-08-04 | Stop reason: HOSPADM

## 2023-08-03 RX ORDER — DOXYCYCLINE HYCLATE 100 MG/1
100 CAPSULE ORAL EVERY 12 HOURS SCHEDULED
Status: DISCONTINUED | OUTPATIENT
Start: 2023-08-04 | End: 2023-08-04 | Stop reason: HOSPADM

## 2023-08-03 RX ORDER — ACETAMINOPHEN 325 MG/1
975 TABLET ORAL ONCE
Status: COMPLETED | OUTPATIENT
Start: 2023-08-03 | End: 2023-08-03

## 2023-08-03 RX ORDER — ATORVASTATIN CALCIUM 20 MG/1
20 TABLET, FILM COATED ORAL DAILY
Status: DISCONTINUED | OUTPATIENT
Start: 2023-08-04 | End: 2023-08-04 | Stop reason: HOSPADM

## 2023-08-03 RX ORDER — SODIUM CHLORIDE 9 MG/ML
50 INJECTION, SOLUTION INTRAVENOUS CONTINUOUS
Status: DISPENSED | OUTPATIENT
Start: 2023-08-03 | End: 2023-08-04

## 2023-08-03 RX ORDER — OMEGA-3S/DHA/EPA/FISH OIL/D3 300MG-1000
400 CAPSULE ORAL DAILY
COMMUNITY

## 2023-08-03 RX ORDER — GABAPENTIN 300 MG/1
300 CAPSULE ORAL
Status: DISCONTINUED | OUTPATIENT
Start: 2023-08-03 | End: 2023-08-04 | Stop reason: HOSPADM

## 2023-08-03 RX ORDER — AZITHROMYCIN 500 MG/1
500 TABLET, FILM COATED ORAL EVERY 24 HOURS
Status: DISCONTINUED | OUTPATIENT
Start: 2023-08-03 | End: 2023-08-03

## 2023-08-03 RX ORDER — FLUTICASONE PROPIONATE 50 MCG
1 SPRAY, SUSPENSION (ML) NASAL 2 TIMES DAILY
Status: DISCONTINUED | OUTPATIENT
Start: 2023-08-03 | End: 2023-08-04 | Stop reason: HOSPADM

## 2023-08-03 RX ADMIN — SODIUM CHLORIDE 500 ML: 0.9 INJECTION, SOLUTION INTRAVENOUS at 14:16

## 2023-08-03 RX ADMIN — GABAPENTIN 300 MG: 300 CAPSULE ORAL at 22:17

## 2023-08-03 RX ADMIN — SODIUM CHLORIDE 50 ML/HR: 0.9 INJECTION, SOLUTION INTRAVENOUS at 18:51

## 2023-08-03 RX ADMIN — ACETAMINOPHEN 975 MG: 325 TABLET, FILM COATED ORAL at 13:33

## 2023-08-03 RX ADMIN — AZITHROMYCIN 500 MG: 500 TABLET, FILM COATED ORAL at 18:23

## 2023-08-03 RX ADMIN — IOHEXOL 90 ML: 350 INJECTION, SOLUTION INTRAVENOUS at 13:16

## 2023-08-03 RX ADMIN — APIXABAN 5 MG: 5 TABLET, FILM COATED ORAL at 18:51

## 2023-08-03 RX ADMIN — ACETAMINOPHEN 650 MG: 325 TABLET, FILM COATED ORAL at 23:44

## 2023-08-03 RX ADMIN — TETANUS TOXOID, REDUCED DIPHTHERIA TOXOID AND ACELLULAR PERTUSSIS VACCINE, ADSORBED 0.5 ML: 5; 2.5; 8; 8; 2.5 SUSPENSION INTRAMUSCULAR at 13:35

## 2023-08-03 NOTE — ED PROVIDER NOTES
History  Chief Complaint   Patient presents with   • Syncope     Pt presents with c/o syncope this morning. Reports seeing PCP Tuesday and has a "sinus thing going on" and is taking Amoxicillin. She reports hitting the top of her head on the wall. Unknown LOC, +head strike, +thinners (ASA and eliquis). 59-year-old female history of stroke and A-fib on aspirin Eliquis presenting with fall. Patient reports that she went into her kitchen and the next thing she knew she was down onto her hands and knees and struck the top of her head on the wall. Patient unsure of syncope. Denies any prodromal symptoms such as lightheadedness or palpitations. Also denies any chest pain shortness of breath. Reports that she has been feeling "off" for the last few days. She started taking amoxicillin for sinusitis within the last few days. Denies any other complaints. Chart reviewed. Airway intact  Breathing clear to auscultation bilaterally  Circulation 2+ radial and DP PT pulses bilaterally  Disability GCS 15  Exposure completed    Past Medical History:  No date: PMR (polymyalgia rheumatica) ([facility])  No date: Stroke [facility])  Family History: non-contributory  Social History            Prior to Admission Medications   Prescriptions Last Dose Informant Patient Reported? Taking?    ELIQUIS 5 MG  Self Yes No   Sig: Take 5 mg by mouth 2 (two) times a day   Sennosides (LAXATIVE PILLS PO)  Self Yes No   Sig: Take 1 tablet by mouth Twice daily   Sodium Fluoride 5000 PPM 1.1 % GEL  Self Yes No   Sig: BRUSH TWICE DAILY   acetaminophen-codeine (TYLENOL #3) 300-30 mg per tablet  Self Yes No   Patient not taking: Reported on 7/19/2022   aspirin (ECOTRIN LOW STRENGTH) 81 mg EC tablet  Self Yes No   Sig: Take 81 mg by mouth daily   atorvastatin (LIPITOR) 20 mg tablet  Self Yes No   Sig: Take 1 tablet by mouth daily   fluticasone (FLONASE) 50 mcg/act nasal spray  Self Yes No   Sig: SPRAY 2 SPRAYS INTO EACH NOSTRIL EVERY DAY   gabapentin (NEURONTIN) 300 mg capsule  Self Yes No   Sig: Take 300 mg by mouth daily at bedtime   lisinopril-hydrochlorothiazide (PRINZIDE,ZESTORETIC) 20-12.5 MG per tablet  Self Yes No   Sig: Take 0.5 tablets by mouth daily   loratadine (CLARITIN) 10 mg tablet  Self Yes No   Sig: Take 10 mg by mouth daily   metoprolol succinate (TOPROL-XL) 25 mg 24 hr tablet  Self No No   Sig: Take 1 tablet (25 mg total) by mouth daily Take 1 tablet daily   predniSONE 10 mg tablet  Self Yes No   Sig: Take 5 mg by mouth daily    traMADol (ULTRAM) 50 mg tablet  Self Yes No   Sig: TAKE 1 TAB BY MOUTH 2 TIMES A DAY AS NEEDED FOR PAIN, SEVERE. Facility-Administered Medications: None       Past Medical History:   Diagnosis Date   • PMR (polymyalgia rheumatica) (HCC)    • Stroke [facility])        Past Surgical History:   Procedure Laterality Date   • IR AORTAGRAM WITH RUN-OFF  3/9/2021   • REPLACEMENT TOTAL KNEE         History reviewed. No pertinent family history. I have reviewed and agree with the history as documented. E-Cigarette/Vaping   • E-Cigarette Use Never User      E-Cigarette/Vaping Substances   • Nicotine No    • THC No    • CBD No    • Flavoring No    • Other No    • Unknown No      Social History     Tobacco Use   • Smoking status: Never   • Smokeless tobacco: Never   Vaping Use   • Vaping Use: Never used   Substance Use Topics   • Alcohol use: No   • Drug use: No       Review of Systems   Constitutional: Negative for appetite change, chills, diaphoresis, fever and unexpected weight change. HENT: Negative for congestion and rhinorrhea. Eyes: Negative for photophobia and visual disturbance. Respiratory: Negative for cough, chest tightness and shortness of breath. Cardiovascular: Negative for chest pain, palpitations and leg swelling. Gastrointestinal: Negative for abdominal distention, abdominal pain, blood in stool, constipation, diarrhea, nausea and vomiting. Genitourinary: Negative for dysuria and hematuria. Musculoskeletal: Negative for back pain, joint swelling, neck pain and neck stiffness. Skin: Positive for wound. Negative for color change, pallor and rash. Neurological: Positive for syncope and headaches. Negative for dizziness, weakness and light-headedness. Psychiatric/Behavioral: Negative for agitation. All other systems reviewed and are negative. Physical Exam  Physical Exam  Vitals and nursing note reviewed. Constitutional:       General: She is not in acute distress. Appearance: Normal appearance. She is well-developed. She is not ill-appearing, toxic-appearing or diaphoretic. HENT:      Head: Normocephalic. Comments: Abrasion to top of head with minimal overlying tenderness     Nose: Nose normal. No congestion or rhinorrhea. Mouth/Throat:      Mouth: Mucous membranes are moist.      Pharynx: Oropharynx is clear. No oropharyngeal exudate or posterior oropharyngeal erythema. Eyes:      General: No scleral icterus. Right eye: No discharge. Left eye: No discharge. Extraocular Movements: Extraocular movements intact. Conjunctiva/sclera: Conjunctivae normal.      Pupils: Pupils are equal, round, and reactive to light. Neck:      Vascular: No JVD. Trachea: No tracheal deviation. Comments: Supple. Normal range of motion. Cardiovascular:      Rate and Rhythm: Normal rate and regular rhythm. Heart sounds: Normal heart sounds. No murmur heard. No friction rub. No gallop. Comments: Normal rate and regular rhythm  Pulmonary:      Effort: Pulmonary effort is normal. No respiratory distress. Breath sounds: Normal breath sounds. No stridor. No wheezing or rales. Comments: Clear to auscultation bilaterally  Chest:      Chest wall: No tenderness. Abdominal:      General: Bowel sounds are normal. There is no distension. Palpations: Abdomen is soft. Tenderness: There is no abdominal tenderness.  There is no right CVA tenderness, left CVA tenderness, guarding or rebound. Comments: Soft, nontender, nondistended. Normal bowel sounds throughout   Musculoskeletal:         General: Tenderness present. No swelling, deformity or signs of injury. Normal range of motion. Cervical back: Normal range of motion and neck supple. No rigidity. No muscular tenderness. Right lower leg: No edema. Left lower leg: No edema. Comments: Mild left anterior knee tenderness. No neck or back tenderness including midline. Abrasion to right elbow without tenderness. Lymphadenopathy:      Cervical: No cervical adenopathy. Skin:     General: Skin is warm and dry. Coloration: Skin is not pale. Findings: No erythema or rash. Neurological:      General: No focal deficit present. Mental Status: She is alert. Mental status is at baseline. Sensory: No sensory deficit. Motor: No weakness or abnormal muscle tone. Coordination: Coordination normal.      Gait: Gait normal.      Comments: Alert. Strength and sensation grossly intact. Ambulatory without difficulty at baseline. Psychiatric:         Behavior: Behavior normal.         Thought Content:  Thought content normal.         Vital Signs  ED Triage Vitals   Temperature Pulse Respirations Blood Pressure SpO2   08/03/23 1226 08/03/23 1226 08/03/23 1226 08/03/23 1226 08/03/23 1226   97.8 °F (36.6 °C) 80 18 (!) 177/82 98 %      Temp Source Heart Rate Source Patient Position - Orthostatic VS BP Location FiO2 (%)   08/03/23 1226 08/03/23 1226 08/03/23 1226 08/03/23 1226 --   Oral Monitor Sitting Left arm       Pain Score       08/03/23 1333       5           Vitals:    08/03/23 1226 08/03/23 1330 08/03/23 1528   BP: (!) 177/82 157/79 153/74   Pulse: 80 78 75   Patient Position - Orthostatic VS: Sitting Sitting          Visual Acuity      ED Medications  Medications   acetaminophen (TYLENOL) tablet 975 mg (975 mg Oral Given 8/3/23 1333) tetanus-diphtheria-acellular pertussis (BOOSTRIX) IM injection 0.5 mL (0.5 mL Intramuscular Given 8/3/23 1335)   iohexol (OMNIPAQUE) 350 MG/ML injection (SINGLE-DOSE) 90 mL (90 mL Intravenous Given 8/3/23 1316)   sodium chloride 0.9 % bolus 500 mL (500 mL Intravenous New Bag 8/3/23 1416)       Diagnostic Studies  Results Reviewed     Procedure Component Value Units Date/Time    HS Troponin I 2hr [[id number]] Collected: 08/03/23 1528    Lab Status:  In process Specimen: Blood from Arm, Left Updated: 08/03/23 1531    HS Troponin I 4hr [[id number]]     Lab Status: No result Specimen: Blood     Basic metabolic panel [[id number]]  (Abnormal) Collected: 08/03/23 1306    Lab Status: Final result Specimen: Blood from Arm, Right Updated: 08/03/23 1344     Sodium 128 mmol/L      Potassium 4.5 mmol/L      Chloride 94 mmol/L      CO2 25 mmol/L      ANION GAP 9 mmol/L      BUN 23 mg/dL      Creatinine 1.05 mg/dL      Glucose 116 mg/dL      Calcium 9.6 mg/dL      eGFR 47 ml/min/1.73sq m     Narrative:      [place] guidelines for Chronic Kidney Disease (CKD):   •  Stage 1 with normal or high GFR (GFR > 90 mL/min/1.73 square meters)  •  Stage 2 Mild CKD (GFR = 60-89 mL/min/1.73 square meters)  •  Stage 3A Moderate CKD (GFR = 45-59 mL/min/1.73 square meters)  •  Stage 3B Moderate CKD (GFR = 30-44 mL/min/1.73 square meters)  •  Stage 4 Severe CKD (GFR = 15-29 mL/min/1.73 square meters)  •  Stage 5 End Stage CKD (GFR <15 mL/min/1.73 square meters)  Note: GFR calculation is accurate only with a steady state creatinine    HS Troponin 0hr (reflex protocol) [[id number]]  (Normal) Collected: 08/03/23 1306    Lab Status: Final result Specimen: Blood from Arm, Right Updated: 08/03/23 1344     hs TnI 0hr 23 ng/L     CBC and differential [[id number]]  (Abnormal) Collected: 08/03/23 1306    Lab Status: Final result Specimen: Blood from Arm, Right Updated: 08/03/23 1316     WBC 10.67 Thousand/uL      RBC 4.67 Million/uL Hemoglobin 14.4 g/dL      Hematocrit 42.5 %      MCV 91 fL      MCH 30.8 pg      MCHC 33.9 g/dL      RDW 13.1 %      MPV 10.0 fL      Platelets 048 Thousands/uL      nRBC 0 /100 WBCs      Neutrophils Relative 89 %      Immat GRANS % 1 %      Lymphocytes Relative 5 %      Monocytes Relative 5 %      Eosinophils Relative 0 %      Basophils Relative 0 %      Neutrophils Absolute 9.51 Thousands/µL      Immature Grans Absolute 0.05 Thousand/uL      Lymphocytes Absolute 0.54 Thousands/µL      Monocytes Absolute 0.51 Thousand/µL      Eosinophils Absolute 0.02 Thousand/µL      Basophils Absolute 0.04 Thousands/µL                  XR Trauma chest portable   Final Result by Ivette Simons MD (08/03 1523)      Mildly increased interstitial markings, better evaluated on concurrent CT. Left lower lobe nodule seen on CT is not well seen by radiograph. Workstation performed: [id number]         TRAUMA - CT head wo contrast   Final Result by Bernabe Adorno MD (08/03 1352)      No acute intracranial abnormality. Mild scalp swelling at the midline vertex without underlying fracture. Workstation performed: [id number]         TRAUMA - CT spine cervical wo contrast   Final Result by Bernabe Adorno MD (08/03 1354)      No cervical spine fracture or traumatic malalignment. Workstation performed: [id number]         CT chest with contrast   Final Result by Bernabe Adorno MD (08/03 1427)      No acute posttraumatic abnormality. 1.1 cm left lower lobe nodule. No prior studies. Based on current Fleischner Society 2017 Guidelines on incidental pulmonary nodule, either PET/CT scan evaluation, tissue sampling or short term interval followup non-contrast CT followup (initially in 3    months) may be considered appropriate. Multinodular goiter. Thyroid ultrasound recommended.          Workstation performed: [id number]         CT recon only thoracic spine   Final Result by Alistair Espinoza Aleta Ortega MD (08/03 3420)      No fracture or traumatic subluxation. Workstation performed: [id number]         XR knee 4+ views left injury    (Results Pending)              Procedures  Procedures         ED Course             HEART Risk Score    Flowsheet Row Most Recent Value   Heart Score Risk Calculator    History 0 Filed at: 08/03/2023 1400   ECG 1 Filed at: 08/03/2023 1400   Age 2 Filed at: 08/03/2023 1400   Risk Factors 2 Filed at: 08/03/2023 1400   Troponin 1 Filed at: 08/03/2023 1400   HEART Score 6 Filed at: 08/03/2023 1400                                      Medical Decision Making  70-year-old female history of stroke and A-fib on aspirin Eliquis presenting with fall. Scattered abrasions and fall on Eliquis with head strike. Unsure LOC. No prodromal symptoms with concern for syncopal event. Trauma evaluation. Plan for cardiac evaluation including EKG and troponin. Basic labs. Symptom management with oral Tylenol and update tetanus. CT imaging and x-rays. Cardiac monitoring. Reassess. Labs interpreted me notable for sodium of 128. Given fluid bolus. Troponin of 23 plan for delta. While in ED, patient was noted to have a few episodes of sinus pauses coinciding with patient's funny feeling in her chest.  Suspect likely source of syncopal event. Cardiology consultation. Will admit for further evaluation and management. Admitted. Amount and/or Complexity of Data Reviewed  Labs: ordered. Radiology: ordered. Risk  OTC drugs. Prescription drug management. Decision regarding hospitalization.           Disposition  Final diagnoses:   Syncope and collapse   Sinus pause   Hyponatremia   Injury of head, initial encounter   Abrasion of head, initial encounter     Time reflects when diagnosis was documented in both MDM as applicable and the Disposition within this note     Time User Action Codes Description Comment    8/3/2023  3:19 PM Savanna Goetz Add [R55] Syncope and collapse     8/3/2023  3:19 PM Jeraldene Redder Add [I45.5] Sinus pause     8/3/2023  3:19 PM Jeraldene Redder Add [E87.1] Hyponatremia     8/3/2023  3:40 PM Jeraldene Redder Add [S09.90XA] Injury of head, initial encounter     8/3/2023  3:41 PM Jeraldene Redder Add [S00.91XA] Abrasion of head, initial encounter       ED Disposition     ED Disposition   Admit    Condition   Stable    Date/Time   Thu Aug 3, 2023  3:37 PM    Comment   Case was discussed with EMELY and the patient's admission status was agreed to be Admission Status: inpatient status to the service of Dr. Donna Fisher . Follow-up Information    None         Patient's Medications   Discharge Prescriptions    No medications on file       No discharge procedures on file.     PDMP Review     None          ED Provider  Electronically Signed by           Eileen Osei MD  08/03/23 5266

## 2023-08-03 NOTE — ASSESSMENT & PLAN NOTE
· Recently being treated for sinusitis she questions if this could be side effect of amoxicillin. Unlikely but will keep her on doxy instead. · Monitor.

## 2023-08-03 NOTE — ASSESSMENT & PLAN NOTE
Patient had an event in which she felt lightheaded like she was going to pass out and fell from her own height. She denies any overt loss of consciousness but mentions being overall tired and fatigued. Appears slightly dry on exam.  Chronically maintained on metoprolol and Eliquis for atrial fibrillation.   The emergency department she does have episodic pauses but none more than 3 seconds     · Observation  · Telemetry monitoring  · Check TSH, Lyme titers  · Hold metoprolol  · Check orthostatic vitals if able  · Gentle IVF 50cc/h/6h

## 2023-08-03 NOTE — H&P
[address]  H&P  Name: Rito Stuart 80 y.o. female I MRN: [id number]  Unit/Bed#: -01 I Date of Admission: 8/3/2023   Date of Service: 8/3/2023 I Hospital Day: 0      Assessment/Plan   * Syncope and collapse  Assessment & Plan  Patient had an event in which she felt lightheaded like she was going to pass out and fell from her own height. She denies any overt loss of consciousness but mentions being overall tired and fatigued. Appears slightly dry on exam.  Chronically maintained on metoprolol and Eliquis for atrial fibrillation. The emergency department she does have episodic pauses but none more than 3 seconds     · Observation  · Telemetry monitoring  · Check TSH, Lyme titers  · Hold metoprolol  · Check orthostatic vitals if able  · Gentle IVF 50cc/h/6h    Hyponatremia  Assessment & Plan  · Sodium of 128 noted. · She appears slightly volume depleted  · Monitor response to IV fluids, depending on numbers may consider further work-up    Sinus pain  Assessment & Plan  · Recently being treated for sinusitis she questions if this could be side effect of amoxicillin. Unlikely but will keep her on doxy instead. · Monitor. Stage 3a chronic kidney disease [facility])  Assessment & Plan  Lab Results   Component Value Date    EGFR 47 08/03/2023    EGFR 47 03/23/2021    EGFR 36 03/11/2021    CREATININE 1.05 08/03/2023    CREATININE 1.07 03/23/2021    CREATININE 1.33 (H) 03/11/2021     · Creatinine baseline appears to be around 1.  · Avoid nephrotoxic agents    Hypertension, essential  Assessment & Plan  · Lisinopril hydrochlorothiazide and metoprolol currently on hold given presyncopal event.   · Monitor blood pressure    Paroxysmal A-fib (HCC)  Assessment & Plan  · Hold metoprolol given episode of bradycardia and pauses  · Continue Eliquis         VTE Prophylaxis: Apixaban (Eliquis)  / sequential compression device   Code Status: DNR/DNI  POLST: POLST form is not discussed and not completed at this time. Discussion with family: Family at the bedside    Anticipated Length of Stay:  Patient will be admitted on an Inpatient basis with an anticipated length of stay of  At least 2 midnights. Justification for Hospital Stay: Syncope, generalized weakness, hyponatremia    Total Time for Visit, including Counseling / Coordination of Care: 90 minutes. Greater than 50% of this total time spent on direct patient counseling and coordination of care. Chief Complaint:   Presyncopal episode    History of Present Illness:    Virgie Lawrence is a 80 y.o. female who presents with presyncopal episode. The patient apparently felt lightheaded and and fell to the ground from her own height, she denies any complete loss of consciousness and appears to remember the whole event, she does mention feeling dizzy and fatigued prior to the event. She denies any sphincter release. States that for the past several days she feels overall unwell. She was recently started on amoxicillin for some presumed sinusitis in the outpatient setting. Patient chronically maintained on beta-blockade and apixaban for atrial fibrillation. Review of Systems:    Review of Systems   Constitutional: Positive for fatigue. Cardiovascular:        Presyncope   All other systems reviewed and are negative. Past Medical and Surgical History:     Past Medical History:   Diagnosis Date   • PMR (polymyalgia rheumatica) (HCC)    • Stroke [facility])        Past Surgical History:   Procedure Laterality Date   • IR AORTAGRAM WITH RUN-OFF  3/9/2021   • REPLACEMENT TOTAL KNEE         Meds/Allergies:    Prior to Admission medications    Medication Sig Start Date End Date Taking?  Authorizing Provider   aspirin (ECOTRIN LOW STRENGTH) 81 mg EC tablet Take 81 mg by mouth daily   Yes Historical Provider, MD   atorvastatin (LIPITOR) 20 mg tablet Take 1 tablet by mouth daily   Yes Historical Provider, MD   cholecalciferol (VITAMIN D3) 400 units tablet Take 400 Units by mouth daily   Yes Historical Provider, MD   ELIQUIS 5 MG Take 5 mg by mouth 2 (two) times a day 12/20/17  Yes Historical Provider, MD   gabapentin (NEURONTIN) 300 mg capsule Take 300 mg by mouth daily at bedtime 4/28/22  Yes Historical Provider, MD   lisinopril-hydrochlorothiazide (PRINZIDE,ZESTORETIC) 20-12.5 MG per tablet Take 0.5 tablets by mouth daily   Yes Historical Provider, MD   loratadine (CLARITIN) 10 mg tablet Take 10 mg by mouth daily 12/7/17  Yes Historical Provider, MD   metoprolol succinate (TOPROL-XL) 25 mg 24 hr tablet Take 1 tablet (25 mg total) by mouth daily Take 1 tablet daily 6/29/20  Yes Ron Valente MD   predniSONE 10 mg tablet Take 5 mg by mouth daily    Yes Historical Provider, MD   Sennosides (LAXATIVE PILLS PO) Take 1 tablet by mouth Twice daily   Yes Historical Provider, MD   acetaminophen-codeine (TYLENOL #3) 300-30 mg per tablet  2/25/21   Historical Provider, MD   fluticasone (FLONASE) 50 mcg/act nasal spray SPRAY 2 SPRAYS INTO EACH NOSTRIL EVERY DAY 4/19/20   Historical Provider, MD   Sodium Fluoride 5000 PPM 1.1 % GEL BRUSH TWICE DAILY 2/22/22   Historical Provider, MD   traMADol (ULTRAM) 50 mg tablet TAKE 1 TAB BY MOUTH 2 TIMES A DAY AS NEEDED FOR PAIN, SEVERE. 3/26/20   Historical Provider, MD     I have reviewed home medications with patient personally. Allergies:    Allergies   Allergen Reactions   • Hydrocodone    • Oxycodone Anxiety and Headache       Social History:     Marital Status: /Civil Union   Substance Use History:   Social History     Substance and Sexual Activity   Alcohol Use No     Social History     Tobacco Use   Smoking Status Never   • Passive exposure: Never   Smokeless Tobacco Never     Social History     Substance and Sexual Activity   Drug Use No       Family History:    non-contributory    Physical Exam:     Vitals:   Blood Pressure: 160/72 (08/03/23 1645)  Pulse: 71 (08/03/23 1645)  Temperature: (!) 97.3 °F (36.3 °C) (08/03/23 1645)  Temp Source: Oral (08/03/23 1645)  Respirations: 18 (08/03/23 1645)  Weight - Scale: 81.6 kg (180 lb) (08/03/23 1821)  SpO2: 97 % (08/03/23 1721)    Physical Exam  Vitals and nursing note reviewed. Constitutional:       Appearance: Normal appearance. She is normal weight. Comments: Female in bed, awake   HENT:      Head: Normocephalic and atraumatic. Right Ear: External ear normal.      Left Ear: External ear normal.      Nose: Nose normal. No congestion. Mouth/Throat:      Mouth: Mucous membranes are dry. Pharynx: Oropharynx is clear. No oropharyngeal exudate or posterior oropharyngeal erythema. Eyes:      General: No scleral icterus. Right eye: No discharge. Left eye: No discharge. Extraocular Movements: Extraocular movements intact. Conjunctiva/sclera: Conjunctivae normal.      Pupils: Pupils are equal, round, and reactive to light. Cardiovascular:      Rate and Rhythm: Normal rate and regular rhythm. Pulses: Normal pulses. Heart sounds: Normal heart sounds. No murmur heard. No friction rub. No gallop. Pulmonary:      Effort: Pulmonary effort is normal. No respiratory distress. Breath sounds: Normal breath sounds. No stridor. No wheezing, rhonchi or rales. Chest:      Chest wall: No tenderness. Abdominal:      General: Abdomen is flat. Bowel sounds are normal. There is no distension. Palpations: Abdomen is soft. There is no mass. Tenderness: There is no abdominal tenderness. There is no guarding or rebound. Musculoskeletal:         General: No swelling, tenderness, deformity or signs of injury. Normal range of motion. Cervical back: Normal range of motion and neck supple. No rigidity. No muscular tenderness. Skin:     General: Skin is warm and dry. Capillary Refill: Capillary refill takes less than 2 seconds. Coloration: Skin is not jaundiced or pale.       Findings: No bruising, erythema, lesion or rash.   Neurological:      General: No focal deficit present. Mental Status: She is alert and oriented to person, place, and time. Mental status is at baseline. Cranial Nerves: No cranial nerve deficit. Sensory: No sensory deficit. Motor: No weakness. Coordination: Coordination normal.   Psychiatric:         Mood and Affect: Mood normal.         Behavior: Behavior normal.         Thought Content: Thought content normal.         Judgment: Judgment normal.             Additional Data:     Lab Results: I have personally reviewed pertinent reports. Results from last 7 days   Lab Units 08/03/23  1306   WBC Thousand/uL 10.67*   HEMOGLOBIN g/dL 14.4   HEMATOCRIT % 42.5   PLATELETS Thousands/uL 207   NEUTROS PCT % 89*   LYMPHS PCT % 5*   MONOS PCT % 5   EOS PCT % 0     Results from last 7 days   Lab Units 08/03/23  1306   SODIUM mmol/L 128*   POTASSIUM mmol/L 4.5   CHLORIDE mmol/L 94*   CO2 mmol/L 25   BUN mg/dL 23   CREATININE mg/dL 1.05   ANION GAP mmol/L 9   CALCIUM mg/dL 9.6   GLUCOSE RANDOM mg/dL 116                       Imaging: I have personally reviewed pertinent reports. XR Trauma chest portable   Final Result by Nicol Burt MD (08/03 1523)      Mildly increased interstitial markings, better evaluated on concurrent CT. Left lower lobe nodule seen on CT is not well seen by radiograph. Workstation performed: [id number]         XR knee 4+ views left injury   Final Result by Payton Baptiste MD (08/03 1615)      Unremarkable appearance of the left knee following total joint replacement. Workstation performed: [id number]         TRAUMA - CT head wo contrast   Final Result by Gela Nagel MD (08/03 1352)      No acute intracranial abnormality. Mild scalp swelling at the midline vertex without underlying fracture.             Workstation performed: [id number]         TRAUMA - CT spine cervical wo contrast   Final Result by Marilia Mims Leiv Gayle MD (08/03 5328)      No cervical spine fracture or traumatic malalignment. Workstation performed: [id number]         CT chest with contrast   Final Result by Glendy Moreland MD (08/03 1427)      No acute posttraumatic abnormality. 1.1 cm left lower lobe nodule. No prior studies. Based on current Fleischner Society 2017 Guidelines on incidental pulmonary nodule, either PET/CT scan evaluation, tissue sampling or short term interval followup non-contrast CT followup (initially in 3    months) may be considered appropriate. Multinodular goiter. Thyroid ultrasound recommended. Workstation performed: [id number]         CT recon only thoracic spine   Final Result by Glendy Moreland MD (08/03 5056)      No fracture or traumatic subluxation. Workstation performed: [id number]                 Allscripts / Epic Records Reviewed: Yes     ** Please Note: This note has been constructed using a voice recognition system.  **

## 2023-08-03 NOTE — ASSESSMENT & PLAN NOTE
· Sodium of 128 noted.   · She appears slightly volume depleted  · Monitor response to IV fluids, depending on numbers may consider further work-up

## 2023-08-03 NOTE — ASSESSMENT & PLAN NOTE
Lab Results   Component Value Date    EGFR 47 08/03/2023    EGFR 47 03/23/2021    EGFR 36 03/11/2021    CREATININE 1.05 08/03/2023    CREATININE 1.07 03/23/2021    CREATININE 1.33 (H) 03/11/2021     · Creatinine baseline appears to be around 1.  · Avoid nephrotoxic agents

## 2023-08-03 NOTE — ASSESSMENT & PLAN NOTE
· Lisinopril hydrochlorothiazide and metoprolol currently on hold given presyncopal event.   · Monitor blood pressure

## 2023-08-04 ENCOUNTER — HOSPITAL ENCOUNTER (INPATIENT)
Facility: HOSPITAL | Age: 88
LOS: 6 days | Discharge: HOME HEALTH | DRG: 243 | End: 2023-08-10
Attending: INTERNAL MEDICINE | Admitting: INTERNAL MEDICINE
Payer: MEDICARE

## 2023-08-04 ENCOUNTER — APPOINTMENT (INPATIENT)
Dept: NON INVASIVE DIAGNOSTICS | Facility: HOSPITAL | Age: 88
DRG: 309 | End: 2023-08-04
Payer: MEDICARE

## 2023-08-04 VITALS
HEIGHT: 63 IN | TEMPERATURE: 99.1 F | HEART RATE: 80 BPM | WEIGHT: 180 LBS | DIASTOLIC BLOOD PRESSURE: 87 MMHG | SYSTOLIC BLOOD PRESSURE: 156 MMHG | BODY MASS INDEX: 31.89 KG/M2 | OXYGEN SATURATION: 96 % | RESPIRATION RATE: 22 BRPM

## 2023-08-04 DIAGNOSIS — G89.29 CHRONIC LEFT-SIDED LOW BACK PAIN WITHOUT SCIATICA: Primary | ICD-10-CM

## 2023-08-04 DIAGNOSIS — I45.5 SINUS PAUSE: ICD-10-CM

## 2023-08-04 DIAGNOSIS — M54.50 CHRONIC LEFT-SIDED LOW BACK PAIN WITHOUT SCIATICA: Primary | ICD-10-CM

## 2023-08-04 DIAGNOSIS — R55 SYNCOPE AND COLLAPSE: ICD-10-CM

## 2023-08-04 PROBLEM — E04.2 MULTINODULAR GOITER: Status: ACTIVE | Noted: 2023-08-04

## 2023-08-04 PROBLEM — R91.1 LUNG NODULE SEEN ON IMAGING STUDY: Status: ACTIVE | Noted: 2023-08-04

## 2023-08-04 LAB
ALBUMIN SERPL BCP-MCNC: 3.8 G/DL (ref 3.5–5)
ALP SERPL-CCNC: 41 U/L (ref 34–104)
ALT SERPL W P-5'-P-CCNC: 12 U/L (ref 7–52)
ANION GAP SERPL CALCULATED.3IONS-SCNC: 7 MMOL/L
AORTIC ROOT: 2.9 CM
APICAL FOUR CHAMBER EJECTION FRACTION: 67 %
APTT PPP: 35 SECONDS (ref 23–37)
APTT PPP: 73 SECONDS (ref 23–37)
APTT PPP: 77 SECONDS (ref 23–37)
ASCENDING AORTA: 4 CM
AST SERPL W P-5'-P-CCNC: 18 U/L (ref 13–39)
ATRIAL RATE: 46 BPM
BASOPHILS # BLD AUTO: 0.04 THOUSANDS/ÂΜL (ref 0–0.1)
BASOPHILS NFR BLD AUTO: 0 % (ref 0–1)
BILIRUB SERPL-MCNC: 0.77 MG/DL (ref 0.2–1)
BUN SERPL-MCNC: 17 MG/DL (ref 5–25)
CALCIUM SERPL-MCNC: 9 MG/DL (ref 8.4–10.2)
CHLORIDE SERPL-SCNC: 97 MMOL/L (ref 96–108)
CO2 SERPL-SCNC: 25 MMOL/L (ref 21–32)
CREAT SERPL-MCNC: 0.93 MG/DL (ref 0.6–1.3)
E WAVE DECELERATION TIME: 137 MS
EOSINOPHIL # BLD AUTO: 0.15 THOUSAND/ÂΜL (ref 0–0.61)
EOSINOPHIL NFR BLD AUTO: 2 % (ref 0–6)
ERYTHROCYTE [DISTWIDTH] IN BLOOD BY AUTOMATED COUNT: 13.2 % (ref 11.6–15.1)
FRACTIONAL SHORTENING: 19 % (ref 28–44)
GFR SERPL CREATININE-BSD FRML MDRD: 55 ML/MIN/1.73SQ M
GLUCOSE SERPL-MCNC: 87 MG/DL (ref 65–140)
HCT VFR BLD AUTO: 39 % (ref 34.8–46.1)
HGB BLD-MCNC: 13.1 G/DL (ref 11.5–15.4)
IMM GRANULOCYTES # BLD AUTO: 0.05 THOUSAND/UL (ref 0–0.2)
IMM GRANULOCYTES NFR BLD AUTO: 1 % (ref 0–2)
INR PPP: 1.19 (ref 0.84–1.19)
INR PPP: 1.21 (ref 0.84–1.19)
INTERVENTRICULAR SEPTUM IN DIASTOLE (PARASTERNAL SHORT AXIS VIEW): 1.3 CM
INTERVENTRICULAR SEPTUM: 1.3 CM (ref 0.6–1.1)
LA/AORTA RATIO 2D: 1.48
LAAS-AP4: 28.9 CM2
LEFT ATRIUM SIZE: 4.3 CM
LEFT INTERNAL DIMENSION IN SYSTOLE: 3.4 CM (ref 2.1–4)
LEFT VENTRICULAR INTERNAL DIMENSION IN DIASTOLE: 4.2 CM (ref 3.5–6)
LEFT VENTRICULAR POSTERIOR WALL IN END DIASTOLE: 1.2 CM
LEFT VENTRICULAR STROKE VOLUME: 31 ML
LVSV (TEICH): 31 ML
LYMPHOCYTES # BLD AUTO: 1.62 THOUSANDS/ÂΜL (ref 0.6–4.47)
LYMPHOCYTES NFR BLD AUTO: 17 % (ref 14–44)
MAGNESIUM SERPL-MCNC: 1.8 MG/DL (ref 1.9–2.7)
MAGNESIUM SERPL-MCNC: 2 MG/DL (ref 1.6–2.6)
MCH RBC QN AUTO: 30.9 PG (ref 26.8–34.3)
MCHC RBC AUTO-ENTMCNC: 33.6 G/DL (ref 31.4–37.4)
MCV RBC AUTO: 92 FL (ref 82–98)
MONOCYTES # BLD AUTO: 1 THOUSAND/ÂΜL (ref 0.17–1.22)
MONOCYTES NFR BLD AUTO: 10 % (ref 4–12)
MV E'TISSUE VEL-SEP: 6 CM/S
MV PEAK A VEL: 0.6 M/S
MV PEAK E VEL: 140 CM/S
MV STENOSIS PRESSURE HALF TIME: 40 MS
MV VALVE AREA P 1/2 METHOD: 5.5 CM2
NEUTROPHILS # BLD AUTO: 6.85 THOUSANDS/ÂΜL (ref 1.85–7.62)
NEUTS SEG NFR BLD AUTO: 70 % (ref 43–75)
NRBC BLD AUTO-RTO: 0 /100 WBCS
PLATELET # BLD AUTO: 198 THOUSANDS/UL (ref 149–390)
PMV BLD AUTO: 10 FL (ref 8.9–12.7)
POTASSIUM SERPL-SCNC: 3.6 MMOL/L (ref 3.5–5.3)
PROT SERPL-MCNC: 6.5 G/DL (ref 6.4–8.4)
PROTHROMBIN TIME: 15 SECONDS (ref 11.6–14.5)
PROTHROMBIN TIME: 15.3 SECONDS (ref 11.6–14.5)
QRS AXIS: 115 DEGREES
QRSD INTERVAL: 108 MS
QT INTERVAL: 456 MS
QTC INTERVAL: 299 MS
RBC # BLD AUTO: 4.24 MILLION/UL (ref 3.81–5.12)
SL CV PED ECHO LEFT VENTRICLE DIASTOLIC VOLUME (MOD BIPLANE) 2D: 79 ML
SL CV PED ECHO LEFT VENTRICLE SYSTOLIC VOLUME (MOD BIPLANE) 2D: 47 ML
SODIUM SERPL-SCNC: 129 MMOL/L (ref 135–147)
T WAVE AXIS: 67 DEGREES
TRICUSPID ANNULAR PLANE SYSTOLIC EXCURSION: 1.9 CM
TRICUSPID VALVE PEAK REGURGITATION VELOCITY: 3.54 M/S
VENTRICULAR RATE: 26 BPM
WBC # BLD AUTO: 9.71 THOUSAND/UL (ref 4.31–10.16)

## 2023-08-04 PROCEDURE — 85610 PROTHROMBIN TIME: CPT

## 2023-08-04 PROCEDURE — 99239 HOSP IP/OBS DSCHRG MGMT >30: CPT | Performed by: INTERNAL MEDICINE

## 2023-08-04 PROCEDURE — 85730 THROMBOPLASTIN TIME PARTIAL: CPT | Performed by: INTERNAL MEDICINE

## 2023-08-04 PROCEDURE — 83735 ASSAY OF MAGNESIUM: CPT | Performed by: INTERNAL MEDICINE

## 2023-08-04 PROCEDURE — 99223 1ST HOSP IP/OBS HIGH 75: CPT | Performed by: INTERNAL MEDICINE

## 2023-08-04 PROCEDURE — 93010 ELECTROCARDIOGRAM REPORT: CPT | Performed by: INTERNAL MEDICINE

## 2023-08-04 PROCEDURE — 93306 TTE W/DOPPLER COMPLETE: CPT

## 2023-08-04 PROCEDURE — 85025 COMPLETE CBC W/AUTO DIFF WBC: CPT | Performed by: INTERNAL MEDICINE

## 2023-08-04 PROCEDURE — 93306 TTE W/DOPPLER COMPLETE: CPT | Performed by: INTERNAL MEDICINE

## 2023-08-04 PROCEDURE — 85730 THROMBOPLASTIN TIME PARTIAL: CPT

## 2023-08-04 PROCEDURE — NC001 PR NO CHARGE: Performed by: INTERNAL MEDICINE

## 2023-08-04 PROCEDURE — 85610 PROTHROMBIN TIME: CPT | Performed by: INTERNAL MEDICINE

## 2023-08-04 PROCEDURE — 80053 COMPREHEN METABOLIC PANEL: CPT | Performed by: INTERNAL MEDICINE

## 2023-08-04 RX ORDER — AMOXICILLIN 250 MG
1 CAPSULE ORAL 2 TIMES DAILY
Status: DISCONTINUED | OUTPATIENT
Start: 2023-08-04 | End: 2023-08-04 | Stop reason: HOSPADM

## 2023-08-04 RX ORDER — HEPARIN SODIUM 10000 [USP'U]/100ML
3-20 INJECTION, SOLUTION INTRAVENOUS
Status: CANCELLED | OUTPATIENT
Start: 2023-08-04

## 2023-08-04 RX ORDER — LISINOPRIL 5 MG/1
5 TABLET ORAL DAILY
Status: DISCONTINUED | OUTPATIENT
Start: 2023-08-05 | End: 2023-08-05

## 2023-08-04 RX ORDER — ATORVASTATIN CALCIUM 20 MG/1
20 TABLET, FILM COATED ORAL DAILY
Status: CANCELLED | OUTPATIENT
Start: 2023-08-05

## 2023-08-04 RX ORDER — SODIUM CHLORIDE 9 MG/ML
50 INJECTION, SOLUTION INTRAVENOUS CONTINUOUS
Status: DISCONTINUED | OUTPATIENT
Start: 2023-08-04 | End: 2023-08-04

## 2023-08-04 RX ORDER — GABAPENTIN 300 MG/1
300 CAPSULE ORAL
Status: DISCONTINUED | OUTPATIENT
Start: 2023-08-04 | End: 2023-08-10 | Stop reason: HOSPADM

## 2023-08-04 RX ORDER — LANOLIN ALCOHOL/MO/W.PET/CERES
6 CREAM (GRAM) TOPICAL
Status: DISCONTINUED | OUTPATIENT
Start: 2023-08-04 | End: 2023-08-10 | Stop reason: HOSPADM

## 2023-08-04 RX ORDER — FLUTICASONE PROPIONATE 50 MCG
1 SPRAY, SUSPENSION (ML) NASAL 2 TIMES DAILY
Status: DISCONTINUED | OUTPATIENT
Start: 2023-08-04 | End: 2023-08-10 | Stop reason: HOSPADM

## 2023-08-04 RX ORDER — LIDOCAINE 50 MG/G
1 PATCH TOPICAL DAILY
Status: DISCONTINUED | OUTPATIENT
Start: 2023-08-04 | End: 2023-08-04 | Stop reason: HOSPADM

## 2023-08-04 RX ORDER — ATORVASTATIN CALCIUM 20 MG/1
20 TABLET, FILM COATED ORAL DAILY
Status: DISCONTINUED | OUTPATIENT
Start: 2023-08-05 | End: 2023-08-10 | Stop reason: HOSPADM

## 2023-08-04 RX ORDER — HEPARIN SODIUM 10000 [USP'U]/100ML
3-20 INJECTION, SOLUTION INTRAVENOUS
Status: DISCONTINUED | OUTPATIENT
Start: 2023-08-04 | End: 2023-08-05

## 2023-08-04 RX ORDER — SODIUM CHLORIDE 9 MG/ML
50 INJECTION, SOLUTION INTRAVENOUS CONTINUOUS
Status: CANCELLED | OUTPATIENT
Start: 2023-08-04 | End: 2023-08-04

## 2023-08-04 RX ORDER — LANOLIN ALCOHOL/MO/W.PET/CERES
6 CREAM (GRAM) TOPICAL
Status: DISCONTINUED | OUTPATIENT
Start: 2023-08-04 | End: 2023-08-04 | Stop reason: HOSPADM

## 2023-08-04 RX ORDER — HEPARIN SODIUM 1000 [USP'U]/ML
4000 INJECTION, SOLUTION INTRAVENOUS; SUBCUTANEOUS EVERY 6 HOURS PRN
Status: CANCELLED | OUTPATIENT
Start: 2023-08-04

## 2023-08-04 RX ORDER — HEPARIN SODIUM 10000 [USP'U]/100ML
3-20 INJECTION, SOLUTION INTRAVENOUS
Status: DISCONTINUED | OUTPATIENT
Start: 2023-08-04 | End: 2023-08-04 | Stop reason: HOSPADM

## 2023-08-04 RX ORDER — HEPARIN SODIUM 1000 [USP'U]/ML
2000 INJECTION, SOLUTION INTRAVENOUS; SUBCUTANEOUS EVERY 6 HOURS PRN
Status: DISCONTINUED | OUTPATIENT
Start: 2023-08-04 | End: 2023-08-05

## 2023-08-04 RX ORDER — LISINOPRIL 5 MG/1
5 TABLET ORAL DAILY
Status: DISCONTINUED | OUTPATIENT
Start: 2023-08-04 | End: 2023-08-04 | Stop reason: HOSPADM

## 2023-08-04 RX ORDER — DOXYCYCLINE HYCLATE 100 MG/1
100 CAPSULE ORAL EVERY 12 HOURS SCHEDULED
Status: CANCELLED | OUTPATIENT
Start: 2023-08-04

## 2023-08-04 RX ORDER — LISINOPRIL 5 MG/1
5 TABLET ORAL DAILY
Status: CANCELLED | OUTPATIENT
Start: 2023-08-04

## 2023-08-04 RX ORDER — FLUTICASONE PROPIONATE 50 MCG
1 SPRAY, SUSPENSION (ML) NASAL 2 TIMES DAILY
Status: CANCELLED | OUTPATIENT
Start: 2023-08-04

## 2023-08-04 RX ORDER — SODIUM CHLORIDE 9 MG/ML
50 INJECTION, SOLUTION INTRAVENOUS CONTINUOUS
Status: DISPENSED | OUTPATIENT
Start: 2023-08-04 | End: 2023-08-05

## 2023-08-04 RX ORDER — HEPARIN SODIUM 1000 [USP'U]/ML
4000 INJECTION, SOLUTION INTRAVENOUS; SUBCUTANEOUS EVERY 6 HOURS PRN
Status: DISCONTINUED | OUTPATIENT
Start: 2023-08-04 | End: 2023-08-04 | Stop reason: HOSPADM

## 2023-08-04 RX ORDER — LANOLIN ALCOHOL/MO/W.PET/CERES
6 CREAM (GRAM) TOPICAL
Status: CANCELLED | OUTPATIENT
Start: 2023-08-04

## 2023-08-04 RX ORDER — LIDOCAINE 50 MG/G
1 PATCH TOPICAL DAILY
Status: DISCONTINUED | OUTPATIENT
Start: 2023-08-05 | End: 2023-08-05

## 2023-08-04 RX ORDER — HEPARIN SODIUM 1000 [USP'U]/ML
2000 INJECTION, SOLUTION INTRAVENOUS; SUBCUTANEOUS EVERY 6 HOURS PRN
Status: DISCONTINUED | OUTPATIENT
Start: 2023-08-04 | End: 2023-08-04 | Stop reason: HOSPADM

## 2023-08-04 RX ORDER — GABAPENTIN 300 MG/1
300 CAPSULE ORAL
Status: CANCELLED | OUTPATIENT
Start: 2023-08-04

## 2023-08-04 RX ORDER — AMOXICILLIN 250 MG
1 CAPSULE ORAL 2 TIMES DAILY
Status: DISCONTINUED | OUTPATIENT
Start: 2023-08-04 | End: 2023-08-10 | Stop reason: HOSPADM

## 2023-08-04 RX ORDER — LIDOCAINE 50 MG/G
1 PATCH TOPICAL DAILY
Status: CANCELLED | OUTPATIENT
Start: 2023-08-05

## 2023-08-04 RX ORDER — HYDRALAZINE HYDROCHLORIDE 20 MG/ML
5 INJECTION INTRAMUSCULAR; INTRAVENOUS ONCE
Status: COMPLETED | OUTPATIENT
Start: 2023-08-04 | End: 2023-08-04

## 2023-08-04 RX ORDER — DOXYCYCLINE HYCLATE 100 MG/1
100 CAPSULE ORAL EVERY 12 HOURS SCHEDULED
Status: DISCONTINUED | OUTPATIENT
Start: 2023-08-05 | End: 2023-08-07

## 2023-08-04 RX ORDER — HEPARIN SODIUM 1000 [USP'U]/ML
2000 INJECTION, SOLUTION INTRAVENOUS; SUBCUTANEOUS EVERY 6 HOURS PRN
Status: CANCELLED | OUTPATIENT
Start: 2023-08-04

## 2023-08-04 RX ORDER — CALCIUM CARBONATE 500 MG/1
500 TABLET, CHEWABLE ORAL DAILY PRN
Status: DISCONTINUED | OUTPATIENT
Start: 2023-08-04 | End: 2023-08-04 | Stop reason: HOSPADM

## 2023-08-04 RX ORDER — AMOXICILLIN 250 MG
1 CAPSULE ORAL 2 TIMES DAILY
Status: CANCELLED | OUTPATIENT
Start: 2023-08-04

## 2023-08-04 RX ORDER — ACETAMINOPHEN 325 MG/1
650 TABLET ORAL EVERY 6 HOURS PRN
Status: CANCELLED | OUTPATIENT
Start: 2023-08-04

## 2023-08-04 RX ORDER — HEPARIN SODIUM 1000 [USP'U]/ML
4000 INJECTION, SOLUTION INTRAVENOUS; SUBCUTANEOUS EVERY 6 HOURS PRN
Status: DISCONTINUED | OUTPATIENT
Start: 2023-08-04 | End: 2023-08-05

## 2023-08-04 RX ORDER — ACETAMINOPHEN 325 MG/1
650 TABLET ORAL EVERY 6 HOURS PRN
Status: DISCONTINUED | OUTPATIENT
Start: 2023-08-04 | End: 2023-08-10 | Stop reason: HOSPADM

## 2023-08-04 RX ADMIN — LISINOPRIL 5 MG: 5 TABLET ORAL at 10:52

## 2023-08-04 RX ADMIN — Medication 6 MG: at 01:41

## 2023-08-04 RX ADMIN — SODIUM CHLORIDE 50 ML/HR: 0.9 INJECTION, SOLUTION INTRAVENOUS at 10:48

## 2023-08-04 RX ADMIN — HYDRALAZINE HYDROCHLORIDE 5 MG: 20 INJECTION, SOLUTION INTRAMUSCULAR; INTRAVENOUS at 23:12

## 2023-08-04 RX ADMIN — SENNOSIDES AND DOCUSATE SODIUM 1 TABLET: 50; 8.6 TABLET ORAL at 17:40

## 2023-08-04 RX ADMIN — SENNOSIDES AND DOCUSATE SODIUM 1 TABLET: 50; 8.6 TABLET ORAL at 08:24

## 2023-08-04 RX ADMIN — DOXYCYCLINE 100 MG: 100 CAPSULE ORAL at 08:24

## 2023-08-04 RX ADMIN — SODIUM CHLORIDE 50 ML/HR: 0.9 INJECTION, SOLUTION INTRAVENOUS at 23:05

## 2023-08-04 RX ADMIN — SENNOSIDES AND DOCUSATE SODIUM 1 TABLET: 50; 8.6 TABLET ORAL at 23:03

## 2023-08-04 RX ADMIN — ANTACID TABLETS 500 MG: 500 TABLET, CHEWABLE ORAL at 20:18

## 2023-08-04 RX ADMIN — ACETAMINOPHEN 650 MG: 325 TABLET, FILM COATED ORAL at 16:10

## 2023-08-04 RX ADMIN — MELATONIN 6 MG: at 23:03

## 2023-08-04 RX ADMIN — ACETAMINOPHEN 650 MG: 325 TABLET, FILM COATED ORAL at 08:24

## 2023-08-04 RX ADMIN — ASPIRIN 81 MG: 81 TABLET, COATED ORAL at 10:53

## 2023-08-04 RX ADMIN — LIDOCAINE 5% 1 PATCH: 700 PATCH TOPICAL at 08:26

## 2023-08-04 RX ADMIN — DOXYCYCLINE 100 MG: 100 CAPSULE ORAL at 20:19

## 2023-08-04 RX ADMIN — HEPARIN SODIUM 12 UNITS/KG/HR: 10000 INJECTION, SOLUTION INTRAVENOUS at 23:04

## 2023-08-04 RX ADMIN — GABAPENTIN 300 MG: 300 CAPSULE ORAL at 23:03

## 2023-08-04 RX ADMIN — HEPARIN SODIUM 12 UNITS/KG/HR: 10000 INJECTION, SOLUTION INTRAVENOUS at 10:43

## 2023-08-04 RX ADMIN — ATORVASTATIN CALCIUM 20 MG: 20 TABLET, FILM COATED ORAL at 08:24

## 2023-08-04 NOTE — ASSESSMENT & PLAN NOTE
· Recently being treated for sinusitis she questions if this could be side effect of amoxicillin. Unlikely but will keep her on doxy instead.   · Cardiology saw patient and plan for transfer to [facility] for pacemaker

## 2023-08-04 NOTE — ASSESSMENT & PLAN NOTE
· Sodium slightly improved today  · Patient appears slightly volume depleted  · Continue gentle fluids  · Continue to monitor

## 2023-08-04 NOTE — ASSESSMENT & PLAN NOTE
Lab Results   Component Value Date    EGFR 55 08/04/2023    EGFR 47 08/03/2023    EGFR 47 03/23/2021    CREATININE 0.93 08/04/2023    CREATININE 1.05 08/03/2023    CREATININE 1.07 03/23/2021     · Creatinine baseline appears to be around 1.  · Avoid nephrotoxic agents

## 2023-08-04 NOTE — Clinical Note
7FR SHEATH REMOVED INTACT [No Acute Distress] : no acute distress [Well Nourished] : well nourished [Well Developed] : well developed [Normal Appearance] : normal appearance [Supple] : supple [Normal Rate/Rhythm] : normal rate/rhythm [Normal S1, S2] : normal s1, s2 [No Murmurs] : no murmurs [No Resp Distress] : no resp distress [No Acc Muscle Use] : no acc muscle use [Normal Rhythm and Effort] : normal rhythm and effort [Clear to Auscultation Bilaterally] : clear to auscultation bilaterally [No Abnormalities] : no abnormalities [Benign] : benign [Not Tender] : not tender [Soft] : soft [Normal Gait] : normal gait [No Clubbing] : no clubbing [No Cyanosis] : no cyanosis [No Edema] : no edema [No Focal Deficits] : no focal deficits [Oriented x3] : oriented x3

## 2023-08-04 NOTE — CONSULTS
Consultation - Cardiology Team One  Lisa Jones 80 y.o. female MRN: [id number]  Unit/Bed#: -01 Encounter: [id number]    Consults    Physician Requesting Consult: Dre Ulrich DO  Reason for Consult / Principal Problem: syncope     Assessment/Plan:    1. Symptomatic bradycardia/sinus pauses  -Telemetry review revealed prior and current 3-second pauses/ bradycardia into the 40s at times  - Patient symptomatic with feeling funny and syncopal episode  -Metoprolol succinate on hold  - Patient to be transferred to [facility] electrophysiology evaluation  - TTE ordered and pending  - Continue to monitor on telemetry, keep pacer pads on patient    2. Hypertension  - Patient's blood pressures are elevated  - Add lisinopril 5 mg daily    3. Paroxysmal atrial fibrillation  -Currently in atrial fibrillation with slow VR and frequent pauses  - Eliquis discontinued, heparin drip started for possible pacemaker implantation  - Metoprolol succinate on hold given bradycardia/pauses  - Continue to monitor heart rate trends on telemetry    4. Hyperlipidemia  - Continue atorvastatin    5. PAD s/p right SFA and popliteal PCI  - Continue aspirin and atorvastatin    6. History of CVA  - Continue aspirin, heparin drip and atorvastatin    HPI: Cardiologist Dr. Arianna Pascal is a 80y.o. year old female who has a history of hypertension, CKD stage III, CVA, peripheral artery disease s/p right SFA and popliteal PCI paroxysmal atrial fibrillation on Eliquis, hyperlipidemia recent sinusitis presents today for syncopal episode. Patient states that she had walked to her window to look out and passed out. She states that she was only out for short time and was able to get up on her own and called her neighbor who brought her to the emergency room for further evaluation. She states that over the last couple weeks she has been feeling "jittery" and having a "weird" or "funny feeling" come over her.   States that she went to her PCP who discovered she had sinusitis and placed on antibiotics. Review of her telemetry monitor shows multiple 3-second pauses and her case was discussed with electrophysiology. She will be transferred to [facility] for further evaluation for  pacemaker implantation. She currently has pacemaker pads applied to her chest and her Eliquis has been discontinued and she has been started on a heparin drip. She does follow with [facility] cardiology, but she has had no current TTE or ischemic evaluation record review.     REVIEW OF SYSTEMS:  Constitutional:  Denies fever or chills, +syncope    Eyes:  Denies change in visual acuity   HENT:  Denies nasal congestion or sore throat   Respiratory:  Denies cough, orthopnea, PND or shortness of breath   Cardiovascular:  Denies chest pain, palpitations or edema   GI:  Denies abdominal pain, nausea, vomiting, bloody stools or diarrhea   :  Denies dysuria, frequency, difficulty in micturition and nocturia  Musculoskeletal:  Denies back pain or joint pain   Neurologic:  Denies headache, focal weakness or sensory changes   Endocrine:  Denies polyuria or polydipsia   Lymphatic:  Denies swollen glands   Psychiatric:  Denies depression or anxiety     Historical Information   Past Medical History:   Diagnosis Date   • PMR (polymyalgia rheumatica) ([facility])    • Stroke [facility])      Past Surgical History:   Procedure Laterality Date   • IR AORTAGRAM WITH RUN-OFF  3/9/2021   • REPLACEMENT TOTAL KNEE       Social History     Substance and Sexual Activity   Alcohol Use No     Social History     Substance and Sexual Activity   Drug Use No     Social History     Tobacco Use   Smoking Status Never   • Passive exposure: Never   Smokeless Tobacco Never       Family History: non-contributory    MEDS & ALLERGIES:  all current active meds have been reviewed and current meds:   Current Facility-Administered Medications   Medication Dose Route Frequency   • acetaminophen (TYLENOL) tablet 650 mg  650 mg Oral Q6H PRN   • aspirin (ECOTRIN LOW STRENGTH) EC tablet 81 mg  81 mg Oral Daily   • atorvastatin (LIPITOR) tablet 20 mg  20 mg Oral Daily   • doxycycline hyclate (VIBRAMYCIN) capsule 100 mg  100 mg Oral Q12H TREVA   • fluticasone (FLONASE) 50 mcg/act nasal spray 1 spray  1 spray Each Nare BID   • gabapentin (NEURONTIN) capsule 300 mg  300 mg Oral HS   • heparin (porcine) 25,000 units in 0.45% NaCl 250 mL infusion (premix)  3-20 Units/kg/hr (Order-Specific) Intravenous Titrated   • heparin (porcine) injection 2,000 Units  2,000 Units Intravenous Q6H PRN   • heparin (porcine) injection 4,000 Units  4,000 Units Intravenous Q6H PRN   • lidocaine (LIDODERM) 5 % patch 1 patch  1 patch Topical Daily   • lisinopril (ZESTRIL) tablet 5 mg  5 mg Oral Daily   • melatonin tablet 6 mg  6 mg Oral HS   • senna-docusate sodium (SENOKOT S) 8.6-50 mg per tablet 1 tablet  1 tablet Oral BID   • sodium chloride 0.9 % infusion  50 mL/hr Intravenous Continuous     heparin (porcine), 3-20 Units/kg/hr (Order-Specific)  sodium chloride, 50 mL/hr      Allergies   Allergen Reactions   • Hydrocodone    • Oxycodone Anxiety and Headache       OBJECTIVE:  Vitals:   Vitals:    08/04/23 0743   BP: 141/77   Pulse: 64   Resp:    Temp: 97.9 °F (36.6 °C)   SpO2: 95%     Body mass index is 31.89 kg/m². Systolic (80BZC), AAX:184 , Min:141 , XPS:476     Diastolic (00BQJ), JMP:47, Min:72, Max:82      Intake/Output Summary (Last 24 hours) at 8/4/2023 1029  Last data filed at 8/4/2023 1006  Gross per 24 hour   Intake 740 ml   Output 814 ml   Net -74 ml     Weight (last 2 days)     Date/Time Weight    08/03/23 1821 81.6 (180)        Invasive Devices     Peripheral Intravenous Line  Duration           Peripheral IV 08/03/23 Right Antecubital <1 day                PHYSICAL EXAMS:  General:  Patient is not in acute distress, laying in the bed comfortably, awake, alert   Head: Normocephalic, Atraumatic.    HEENT: White sclera, pink conjunctiva  Neck:  Supple, no neck vein distention  Respiratory: clear to auscultation   Cardiovascular:  PMI normal, S1-S2 normal, no murmurs, thrills, gallops, rubs, regular rhythm  GI:  Abdomen soft, non-tender, non-distended  Extremities: No edema, normal pulses  Integument:  No skin rashes or ulceration  Lymphatic:  No cervical or inguinal lymphadenopathy  Neurologic:  Patient is awake alert, responding to command, oriented to person, place and time     LABORATORY RESULTS:      CBC with diff:   Results from last 7 days   Lab Units 08/04/23  0454 08/03/23  1306   WBC Thousand/uL 9.71 10.67*   HEMOGLOBIN g/dL 13.1 14.4   HEMATOCRIT % 39.0 42.5   MCV fL 92 91   PLATELETS Thousands/uL 198 207   RBC Million/uL 4.24 4.67   MCH pg 30.9 30.8   MCHC g/dL 33.6 33.9   RDW % 13.2 13.1   MPV fL 10.0 10.0   NRBC AUTO /100 WBCs 0 0       CMP:  Results from last 7 days   Lab Units 08/04/23  0454 08/03/23  1306   POTASSIUM mmol/L 3.6 4.5   CHLORIDE mmol/L 97 94*   CO2 mmol/L 25 25   BUN mg/dL 17 23   CREATININE mg/dL 0.93 1.05   CALCIUM mg/dL 9.0 9.6   AST U/L 18  --    ALT U/L 12  --    ALK PHOS U/L 41  --    EGFR ml/min/1.73sq m 55 47       BMP:  Results from last 7 days   Lab Units 08/04/23  0454 08/03/23  1306   POTASSIUM mmol/L 3.6 4.5   CHLORIDE mmol/L 97 94*   CO2 mmol/L 25 25   BUN mg/dL 17 23   CREATININE mg/dL 0.93 1.05   CALCIUM mg/dL 9.0 9.6                    Results from last 7 days   Lab Units 08/03/23  1306   TSH 3RD GENERATON uIU/mL 1.319           Lipid Profile:   No results found for: "CHOL"  No results found for: "HDL"  No results found for: "LDLCALC"  No results found for: "TRIG"    Cardiac testing:   No results found for this or any previous visit. No results found for this or any previous visit. No valid procedures specified. No results found for this or any previous visit. Imaging:   I have personally reviewed pertinent reports.         EKG reviewed personally:  Atrial fibrillation with slow VR    Telemetry reviewed personally:   b Atrial fibrillation slow VR      Code Status: Level 3 - DNAR and DNI      ALEXIS Dominguez  8/4/2023,10:29 AM

## 2023-08-04 NOTE — ASSESSMENT & PLAN NOTE
· Continue to hold metoprolol  · Hold Eliquis  · Given Eliquis will be held patient placed on heparin drip for stroke prophylaxis

## 2023-08-04 NOTE — Clinical Note
The PACER GENERATOR EDITA XT DR PHILLIP LOVE - [id number] device was inserted. The leads were placed into the connector and visually verified to be in correct position. Injury current obtained.

## 2023-08-04 NOTE — DISCHARGE INSTR - OTHER ORDERS
Skin care plans:  1-Hydraguard to bilateral sacrum, buttock and heels BID and PRN  2-Elevate heels to offload pressure. 3-Ehob cushion in chair when out of bed. 4-Moisturize skin daily with skin nourishing cream.  5-Turn/reposition q2h for pressure re-distribution on skin. 6-Scalp wound- Cleanse with NSS, pat dry. Apply Vaseline ointment over wound bed 2 times a day and as needed if ointment is removed. 7-R elbow skin tear- Cleanse wound be with NSS, pat dry. Apply Dermagran over wound bed and cover with small clover Allevyn foam dressing. Change every other day and as needed.

## 2023-08-04 NOTE — ASSESSMENT & PLAN NOTE
· Telemetry noted to have multiple sinus pauses  · Patient seen by cardiology and plan for pacemaker placement at [facility]  · Continue telemetry monitoring  · TSH normal  · Hold metoprolol  · Continue gentle fluids  · Continue to monitor

## 2023-08-04 NOTE — ACP (ADVANCE CARE PLANNING)
Advanced Care Planning Progress Note    Serious Illness Conversation    1. What is your understanding now of where you are with your illness? Prognostic Understanding: appropriate understanding of prognosis     2. How much information about what is likely to be ahead with your illness would you like to have? Information: patient wants to be fully informed     3. What did you (clinician) communicate to the patient? Prognostic Communication: Uncertain - It can be difficult to predict what will happen with your illness. I hope you will continue to live well for a long time but I’m worried that you could get sick quickly, and I think it is important to prepare for that possibility. Context:  Patient is a pleasant 81yo F who presented with an episode of presyncope. Given her age I did discuss what she wants done and what she does not want done in the event that she may get sicker in regards to her cardiopulmonary status. Given her age the likelihood of survival in the event of a cardiopulmonary resuscitation would be low. The patient states that although she likes to be alive she would not like to be subjected to chest compressions or mechanical ventilation in the event of a cardiopulmonary arrest.  She would like to die in peace when the time comes. For the time being however she is still interested in leaving and she would like to treat any potential reversible causes of bradycardia such as medication induced, infectious, she would be okay with a pacemaker if needed as well. If you become sicker, how much are you willing to go through for the possibility of gaining more time? Be on a ventilator: No Be uncomfortable:  No             I have spent 37 minutes speaking with my patient on advanced care planning today or during this visit     Advanced directives: DNR/DNI         Alexander Simpson MD

## 2023-08-04 NOTE — WOUND OSTOMY CARE
Progress Note - Wound   Rigo Folds 80 y.o. female MRN: [id number]  Unit/Bed#: -01 Encounter: [id number]      Assessment:   Patient admitted due to syncope and collapse. History of stroke, HTN, CKD. Wound care consulted for head and right elbow wounds. Patient agreeable to assessment, alert and oriented x4, continent of bowel and bladder, independently turns for assessment, is an assist with care, will be getting transferred to [address] [place] [address]. 1. Bilateral sacrum, buttock, hips, left elbow, and heels- skin is dry, intact, blanchable. 2. Scalp- traumatic abrasion from fall at home. Wound is oval in shape, full-thickness, 100% beefy red/pink tissue, no drainage noted. Marija-wound is dry, intact, no redness, no warmth, no swelling. 3. Right elbow skin tear- occurred during fall at home. Wound is oval in shape, partial thickness, partial skin flap approximated over wound bed, wound be is 100% pink tissue, with small amount of sanguineous drainage noted. Marija-wound is dry, intact, fragile. Educated patient on importance of frequent offloading of pressure via turning, repositioning, and weight-shifting. Verbalized understanding of plan of care. No induration, fluctuance, odor, warmth, redness, or purulence noted to the above noted wounds. New dressings applied. Patient tolerated well, reports tenderness to the wounds. Primary nurse aware of plan of care. See flow sheets for more detailed assessment findings. Will follow along. Skin care plans:  1-Hydraguard to bilateral sacrum, buttock and heels BID and PRN  2-Elevate heels to offload pressure. 3-Ehob cushion in chair when out of bed. 4-Moisturize skin daily with skin nourishing cream.  5-Turn/reposition q2h for pressure re-distribution on skin. 6-Scalp wound- Cleanse with NSS, pat dry. Apply Vaseline ointment over wound bed 2 times a day and as needed if ointment is removed.   7-R elbow skin tear- Cleanse wound be with NSS, pat dry. Apply Finger Graver over wound bed and cover with small clover Allevyn foam dressing. Change every other day and as needed. Wound 08/03/23 Skin Tear Abrasion(s) Elbow Posterior;Right (Active)   Wound Image   08/04/23 1210   Wound Description Pink;Fragile 08/04/23 1210   Marija-wound Assessment Dry; Intact;Fragile 08/04/23 1210   Wound Length (cm) 1.5 cm 08/04/23 1210   Wound Width (cm) 1 cm 08/04/23 1210   Wound Depth (cm) 0.1 cm 08/04/23 1210   Wound Surface Area (cm^2) 1.5 cm^2 08/04/23 1210   Wound Volume (cm^3) 0.15 cm^3 08/04/23 1210   Calculated Wound Volume (cm^3) 0.15 cm^3 08/04/23 1210   Tunneling 0 cm 08/04/23 1210   Undermining 0 08/04/23 1210   Drainage Amount Small 08/04/23 1210   Drainage Description Sanguineous 08/04/23 1210   Non-staged Wound Description Partial thickness 08/04/23 1210   Treatments Cleansed;Irrigation with NSS;Site care 08/04/23 1210   Dressing Dermagran gauze; Foam, Silicon (eg. Allevyn, etc) 08/04/23 1210   Wound packed? No 08/04/23 1210   Dressing Changed Changed 08/04/23 1210   Patient Tolerance Tolerated well 08/04/23 1210   Dressing Status Clean;Dry; Intact 08/04/23 1210       Wound 08/03/23 Traumatic Abrasion(s) Head Superior (Active)   Wound Image   08/04/23 1208   Wound Description Beefy red;Clean 08/04/23 1208   Marija-wound Assessment Dry; Intact 08/04/23 1208   Wound Length (cm) 3.2 cm 08/04/23 1208   Wound Width (cm) 1.2 cm 08/04/23 1208   Wound Depth (cm) 0.2 cm 08/04/23 1208   Wound Surface Area (cm^2) 3.84 cm^2 08/04/23 1208   Wound Volume (cm^3) 0.768 cm^3 08/04/23 1208   Calculated Wound Volume (cm^3) 0.77 cm^3 08/04/23 1208   Tunneling 0 cm 08/04/23 1208   Undermining 0 08/04/23 1208   Drainage Amount None 08/04/23 1208        Non-staged Wound Description Full thickness 08/04/23 1208   Treatments Cleansed;Irrigation with NSS;Site care 08/04/23 1208   Dressing Other (Comment) 08/04/23 1208   Wound packed?  No 08/04/23 [address] [facility] [address] 08/04/23 1208 Patient Tolerance Tolerated well 08/04/23 1208       Call or tigertext with any questions  Wound Care will continue to follow    Homer LOAIZAN RN CWON  Wound and Ostomy care

## 2023-08-04 NOTE — QUICK NOTE
Notified by RN patient's telemetry keeps alarming that intermittently patient's heart rate does drop to the 20s to 30s as low as 28 bpm.  However, it is not consistent as patient's heart rate mostly fluctuating between 60s and 80s but then does go down to the 20s. Per review of chart patient also noted in the ED with sinus pauses and tonight per review of telemetry patient continues to have very brief sinus pauses    Per RN patient is awake but asymptomatic with no complaints. Also of note patient has a DNR/DNI on file. Since patient is asymptomatic and bradycardia is not very persistent and only last a few seconds at this time we will continue to closely monitor on telemetry and appreciate cardiology consult. Continue to hold metoprolol for now. If bradycardia becomes more persistent or patient become symptomatic we will reach out to cardiology on-call and possibly have critical care evaluate.

## 2023-08-04 NOTE — DISCHARGE SUMMARY
[address]  Discharge- Bernadette Estrada 1935, 80 y.o. female MRN: [id number]  Unit/Bed#: -01 Encounter: [id number]  Primary Care Provider: Dhiraj Phillip MD   Date and time admitted to hospital: 8/3/2023 12:26 PM    * Syncope and collapse  Assessment & Plan  · Telemetry noted to have multiple sinus pauses  · Patient seen by cardiology and plan for pacemaker placement at [facility]  · Continue telemetry monitoring  · TSH normal  · Hold metoprolol  · Continue gentle fluids  · Continue to monitor    Sinus pain  Assessment & Plan  · Recently being treated for sinusitis she questions if this could be side effect of amoxicillin. Unlikely but will keep her on doxy instead. · Cardiology saw patient and plan for transfer to [facility] for pacemaker    Hyponatremia  Assessment & Plan  · Sodium slightly improved today  · Patient appears slightly volume depleted  · Continue gentle fluids  · Continue to monitor    Stage 3a chronic kidney disease [facility])  Assessment & Plan  Lab Results   Component Value Date    EGFR 55 08/04/2023    EGFR 47 08/03/2023    EGFR 47 03/23/2021    CREATININE 0.93 08/04/2023    CREATININE 1.05 08/03/2023    CREATININE 1.07 03/23/2021     · Creatinine baseline appears to be around 1.  · Avoid nephrotoxic agents    Hypertension, essential  Assessment & Plan  · Lisinopril hydrochlorothiazide and metoprolol currently on hold given presyncopal event.   · Monitor blood pressure    Paroxysmal A-fib (HCC)  Assessment & Plan  · Continue to hold metoprolol  · Hold Eliquis  · Given Eliquis will be held patient placed on heparin drip for stroke prophylaxis      Medical Problems     Resolved Problems  Date Reviewed: 7/19/2022   None       Discharging Physician / Practitioner: Gillian Sanderson DO  PCP: Dhiraj Phillip MD  Admission Date:   Admission Orders (From admission, onward)     Ordered        08/03/23 1537  INPATIENT ADMISSION  Once Discharge Date: 08/04/23    Consultations During Hospital Stay:  · Cardiology    Reason for Admission: [facility] Course:   Clark Cowden is a 80 y.o. female patient who originally presented to the hospital on 8/3/2023 due to presyncope. Patient states she has been having loss of consciousness and falls without any prior symptoms at times but sometimes she does note dizziness. Patient noted to have multiple sinus pauses on telemetry. Seen by cardiology and recommend transfer to [facility] for pacemaker. Patient is in agreement with plan for pacemaker at this time. Please see above list of diagnoses and related plan for additional information. Condition at Discharge: stable    Discharge Day Visit / Exam:   Subjective: Patient resting comfortably on examination. Patient had no complaints on exam.  Vitals: Blood Pressure: 141/77 (08/04/23 0743)  Pulse: 64 (08/04/23 0743)  Temperature: 97.9 °F (36.6 °C) (08/04/23 0743)  Temp Source: Oral (08/03/23 1645)  Respirations: 16 (08/04/23 0301)  Weight - Scale: 81.6 kg (180 lb) (08/03/23 1821)  SpO2: 95 % (08/04/23 0743)  Exam:   Physical Exam  Vitals and nursing note reviewed. Constitutional:       General: She is not in acute distress. Appearance: She is well-developed. HENT:      Head: Normocephalic and atraumatic. Eyes:      General: No scleral icterus. Conjunctiva/sclera: Conjunctivae normal.   Cardiovascular:      Rate and Rhythm: Normal rate and regular rhythm. Heart sounds: Normal heart sounds. No murmur heard. No friction rub. No gallop. Pulmonary:      Effort: Pulmonary effort is normal. No respiratory distress. Breath sounds: Normal breath sounds. No wheezing or rales. Abdominal:      General: Bowel sounds are normal. There is no distension. Palpations: Abdomen is soft. Tenderness: There is no abdominal tenderness. Musculoskeletal:         General: Normal range of motion.    Skin: General: Skin is warm. Findings: No rash. Neurological:      Mental Status: She is alert and oriented to person, place, and time. Discussion with Family: Updated  (daughter) via phone. Discharge instructions/Information to patient and family:   See after visit summary for information provided to patient and family. Provisions for Follow-Up Care:  See after visit summary for information related to follow-up care and any pertinent home health orders. Disposition:   [address] Transfer to [facility] Readmission: none     Discharge Statement:  I spent 40 minutes discharging the patient. This time was spent on the day of discharge. I had direct contact with the patient on the day of discharge. Greater than 50% of the total time was spent examining patient, answering all patient questions, arranging and discussing plan of care with patient as well as directly providing post-discharge instructions. Additional time then spent on discharge activities. Discharge Medications:  See after visit summary for reconciled discharge medications provided to patient and/or family.       **Please Note: This note may have been constructed using a voice recognition system**

## 2023-08-05 LAB
ALBUMIN SERPL BCP-MCNC: 3.2 G/DL (ref 3.5–5)
ALP SERPL-CCNC: 50 U/L (ref 46–116)
ALT SERPL W P-5'-P-CCNC: 19 U/L (ref 12–78)
ANION GAP SERPL CALCULATED.3IONS-SCNC: 9 MMOL/L
APTT PPP: 74 SECONDS (ref 23–37)
AST SERPL W P-5'-P-CCNC: 21 U/L (ref 5–45)
BASOPHILS # BLD AUTO: 0.07 THOUSANDS/ÂΜL (ref 0–0.1)
BASOPHILS NFR BLD AUTO: 1 % (ref 0–1)
BILIRUB SERPL-MCNC: 0.93 MG/DL (ref 0.2–1)
BUN SERPL-MCNC: 10 MG/DL (ref 5–25)
CALCIUM ALBUM COR SERPL-MCNC: 9.1 MG/DL (ref 8.3–10.1)
CALCIUM SERPL-MCNC: 8.5 MG/DL (ref 8.3–10.1)
CHLORIDE SERPL-SCNC: 99 MMOL/L (ref 96–108)
CO2 SERPL-SCNC: 25 MMOL/L (ref 21–32)
CREAT SERPL-MCNC: 0.86 MG/DL (ref 0.6–1.3)
EOSINOPHIL # BLD AUTO: 0.09 THOUSAND/ÂΜL (ref 0–0.61)
EOSINOPHIL NFR BLD AUTO: 1 % (ref 0–6)
ERYTHROCYTE [DISTWIDTH] IN BLOOD BY AUTOMATED COUNT: 13.2 % (ref 11.6–15.1)
GFR SERPL CREATININE-BSD FRML MDRD: 60 ML/MIN/1.73SQ M
GLUCOSE SERPL-MCNC: 94 MG/DL (ref 65–140)
HCT VFR BLD AUTO: 41.4 % (ref 34.8–46.1)
HGB BLD-MCNC: 13.7 G/DL (ref 11.5–15.4)
IMM GRANULOCYTES # BLD AUTO: 0.03 THOUSAND/UL (ref 0–0.2)
IMM GRANULOCYTES NFR BLD AUTO: 0 % (ref 0–2)
LYMPHOCYTES # BLD AUTO: 1.11 THOUSANDS/ÂΜL (ref 0.6–4.47)
LYMPHOCYTES NFR BLD AUTO: 12 % (ref 14–44)
MAGNESIUM SERPL-MCNC: 1.9 MG/DL (ref 1.6–2.6)
MCH RBC QN AUTO: 30.6 PG (ref 26.8–34.3)
MCHC RBC AUTO-ENTMCNC: 33.1 G/DL (ref 31.4–37.4)
MCV RBC AUTO: 92 FL (ref 82–98)
MONOCYTES # BLD AUTO: 0.87 THOUSAND/ÂΜL (ref 0.17–1.22)
MONOCYTES NFR BLD AUTO: 9 % (ref 4–12)
NEUTROPHILS # BLD AUTO: 7.46 THOUSANDS/ÂΜL (ref 1.85–7.62)
NEUTS SEG NFR BLD AUTO: 77 % (ref 43–75)
NRBC BLD AUTO-RTO: 0 /100 WBCS
PLATELET # BLD AUTO: 192 THOUSANDS/UL (ref 149–390)
PMV BLD AUTO: 10.6 FL (ref 8.9–12.7)
POTASSIUM SERPL-SCNC: 3.2 MMOL/L (ref 3.5–5.3)
PROT SERPL-MCNC: 6.4 G/DL (ref 6.4–8.4)
RBC # BLD AUTO: 4.48 MILLION/UL (ref 3.81–5.12)
SODIUM SERPL-SCNC: 133 MMOL/L (ref 135–147)
WBC # BLD AUTO: 9.63 THOUSAND/UL (ref 4.31–10.16)

## 2023-08-05 PROCEDURE — 83735 ASSAY OF MAGNESIUM: CPT

## 2023-08-05 PROCEDURE — 85025 COMPLETE CBC W/AUTO DIFF WBC: CPT

## 2023-08-05 PROCEDURE — 80053 COMPREHEN METABOLIC PANEL: CPT

## 2023-08-05 PROCEDURE — NC001 PR NO CHARGE: Performed by: INTERNAL MEDICINE

## 2023-08-05 PROCEDURE — 99223 1ST HOSP IP/OBS HIGH 75: CPT | Performed by: INTERNAL MEDICINE

## 2023-08-05 PROCEDURE — 85730 THROMBOPLASTIN TIME PARTIAL: CPT | Performed by: STUDENT IN AN ORGANIZED HEALTH CARE EDUCATION/TRAINING PROGRAM

## 2023-08-05 RX ORDER — LISINOPRIL 10 MG/1
10 TABLET ORAL DAILY
Status: DISCONTINUED | OUTPATIENT
Start: 2023-08-05 | End: 2023-08-10 | Stop reason: HOSPADM

## 2023-08-05 RX ORDER — POTASSIUM CHLORIDE 20 MEQ/1
40 TABLET, EXTENDED RELEASE ORAL ONCE
Status: COMPLETED | OUTPATIENT
Start: 2023-08-05 | End: 2023-08-05

## 2023-08-05 RX ORDER — POLYETHYLENE GLYCOL 3350 17 G/17G
17 POWDER, FOR SOLUTION ORAL DAILY
Status: DISCONTINUED | OUTPATIENT
Start: 2023-08-05 | End: 2023-08-10 | Stop reason: HOSPADM

## 2023-08-05 RX ORDER — MAGNESIUM SULFATE HEPTAHYDRATE 40 MG/ML
2 INJECTION, SOLUTION INTRAVENOUS ONCE
Status: COMPLETED | OUTPATIENT
Start: 2023-08-05 | End: 2023-08-05

## 2023-08-05 RX ORDER — LIDOCAINE 50 MG/G
1 PATCH TOPICAL DAILY
Status: DISCONTINUED | OUTPATIENT
Start: 2023-08-05 | End: 2023-08-10 | Stop reason: HOSPADM

## 2023-08-05 RX ADMIN — DOXYCYCLINE 100 MG: 100 CAPSULE ORAL at 08:20

## 2023-08-05 RX ADMIN — LIDOCAINE 1 PATCH: 50 PATCH CUTANEOUS at 08:27

## 2023-08-05 RX ADMIN — LIDOCAINE 1 PATCH: 50 PATCH CUTANEOUS at 00:14

## 2023-08-05 RX ADMIN — ACETAMINOPHEN 650 MG: 325 TABLET, FILM COATED ORAL at 12:37

## 2023-08-05 RX ADMIN — POLYETHYLENE GLYCOL 3350 17 G: 17 POWDER, FOR SOLUTION ORAL at 08:19

## 2023-08-05 RX ADMIN — POTASSIUM CHLORIDE 40 MEQ: 1500 TABLET, EXTENDED RELEASE ORAL at 06:42

## 2023-08-05 RX ADMIN — SENNOSIDES AND DOCUSATE SODIUM 1 TABLET: 50; 8.6 TABLET ORAL at 17:39

## 2023-08-05 RX ADMIN — DOXYCYCLINE 100 MG: 100 CAPSULE ORAL at 21:17

## 2023-08-05 RX ADMIN — MELATONIN 6 MG: at 21:17

## 2023-08-05 RX ADMIN — GABAPENTIN 300 MG: 300 CAPSULE ORAL at 21:17

## 2023-08-05 RX ADMIN — ATORVASTATIN CALCIUM 20 MG: 20 TABLET, FILM COATED ORAL at 08:20

## 2023-08-05 RX ADMIN — POTASSIUM CHLORIDE 40 MEQ: 1500 TABLET, EXTENDED RELEASE ORAL at 08:20

## 2023-08-05 RX ADMIN — APIXABAN 5 MG: 5 TABLET, FILM COATED ORAL at 17:39

## 2023-08-05 RX ADMIN — SENNOSIDES AND DOCUSATE SODIUM 1 TABLET: 50; 8.6 TABLET ORAL at 08:20

## 2023-08-05 RX ADMIN — LISINOPRIL 10 MG: 10 TABLET ORAL at 08:20

## 2023-08-05 RX ADMIN — FLUTICASONE PROPIONATE 1 SPRAY: 50 SPRAY, METERED NASAL at 08:19

## 2023-08-05 RX ADMIN — FLUTICASONE PROPIONATE 1 SPRAY: 50 SPRAY, METERED NASAL at 17:40

## 2023-08-05 RX ADMIN — ASPIRIN 81 MG: 81 TABLET, COATED ORAL at 08:20

## 2023-08-05 RX ADMIN — MAGNESIUM SULFATE HEPTAHYDRATE 2 G: 2 INJECTION, SOLUTION INTRAVENOUS at 07:36

## 2023-08-05 NOTE — ASSESSMENT & PLAN NOTE
Pt  presented to [place] [address] [facility]  with syncopal event. Pt reports feeling "funny" and lightheaded and fell on her knees and hit her head on the wall. Pt states that she lost consciousness for under 30 seconds and called her neighbor and they brought her to [place] [address]  ED. CT head unremarkable, showed mild midline scalp swelling. Does complain of some lower back pain s/p fall. CT C and T spine negative. On tele was found to have sinus pauses and bradycardia in the 40s. She was transferred to [facility] for EP evaluation. TTE done today at [place] [address], Hoa/sharan Coleman showed EF of 65%. K at 3.2 and Mg at 1.9 on 08/05.     Plan:  · S/P Medtronic dual chamber PPM with left bundle pacing lead  · Restarted home metoprolol succinate 25 mg daily  · On eliquis 5 mg  · Lyme disease pending  · Stable for discharge

## 2023-08-05 NOTE — ASSESSMENT & PLAN NOTE
Sodium on admission at [facility] was 128 and improved to 129, now at 132 and stable on 8/7 also at 132, improved to 134 8/8, likely 2/2 dehydration. Continues to be hyponatremic to 131 (8/9), pt likely at baseline, appears to be hydrating well.     Plan:  · CMP, monitor sodium  · Maintenance fluids  · Will continue to encourage adequate hydration on discharge

## 2023-08-05 NOTE — ASSESSMENT & PLAN NOTE
See Assessment for "Sinus pause"    Plan:  · Lidocaine patch for back pain  · Tylenol prn  · Ice packs for back pain at pt request

## 2023-08-05 NOTE — PLAN OF CARE
Problem: Prexisting or High Potential for Compromised Skin Integrity  Goal: Skin integrity is maintained or improved  Description: INTERVENTIONS:  - Identify patients at risk for skin breakdown  - Assess and monitor skin integrity  - Assess and monitor nutrition and hydration status  - Monitor labs   - Assess for incontinence   - Turn and reposition patient  - Assist with mobility/ambulation  - Relieve pressure over bony prominences  - Avoid friction and shearing  - Provide appropriate hygiene as needed including keeping skin clean and dry  - Evaluate need for skin moisturizer/barrier cream  - Collaborate with interdisciplinary team   - Patient/family teaching  - Consider wound care consult   Outcome: Progressing     Problem: MOBILITY - ADULT  Goal: Maintain or return to baseline ADL function  Description: INTERVENTIONS:  -  Assess patient's ability to carry out ADLs; assess patient's baseline for ADL function and identify physical deficits which impact ability to perform ADLs (bathing, care of mouth/teeth, toileting, grooming, dressing, etc.)  - Assess/evaluate cause of self-care deficits   - Assess range of motion  - Assess patient's mobility; develop plan if impaired  - Assess patient's need for assistive devices and provide as appropriate  - Encourage maximum independence but intervene and supervise when necessary  - Involve family in performance of ADLs  - Assess for home care needs following discharge   - Consider OT consult to assist with ADL evaluation and planning for discharge  - Provide patient education as appropriate  Outcome: Progressing  Goal: Maintains/Returns to pre admission functional level  Description: INTERVENTIONS:  - Perform BMAT or MOVE assessment daily.   - Set and communicate daily mobility goal to care team and patient/family/caregiver. - Collaborate with rehabilitation services on mobility goals if consulted  - Perform Range of Motion 4 times a day.   - Reposition patient every 2 hours.  - Dangle patient 3 times a day  - Stand patient 3 times a day  - Ambulate patient 3 times a day  - Out of bed to chair 3 times a day   - Out of bed for meals 3 times a day  - Out of bed for toileting  - Record patient progress and toleration of activity level   Outcome: Progressing     Problem: PAIN - ADULT  Goal: Verbalizes/displays adequate comfort level or baseline comfort level  Description: Interventions:  - Encourage patient to monitor pain and request assistance  - Assess pain using appropriate pain scale  - Administer analgesics based on type and severity of pain and evaluate response  - Implement non-pharmacological measures as appropriate and evaluate response  - Consider cultural and social influences on pain and pain management  - Notify physician/advanced practitioner if interventions unsuccessful or patient reports new pain  Outcome: Progressing     Problem: INFECTION - ADULT  Goal: Absence or prevention of progression during hospitalization  Description: INTERVENTIONS:  - Assess and monitor for signs and symptoms of infection  - Monitor lab/diagnostic results  - Monitor all insertion sites, i.e. indwelling lines, tubes, and drains  - Monitor endotracheal if appropriate and nasal secretions for changes in amount and color  - Dolph appropriate cooling/warming therapies per order  - Administer medications as ordered  - Instruct and encourage patient and family to use good hand hygiene technique  - Identify and instruct in appropriate isolation precautions for identified infection/condition  Outcome: Progressing  Goal: Absence of fever/infection during neutropenic period  Description: INTERVENTIONS:  - Monitor WBC    Outcome: Progressing     Problem: SAFETY ADULT  Goal: Patient will remain free of falls  Description: INTERVENTIONS:  - Educate patient/family on patient safety including physical limitations  - Instruct patient to call for assistance with activity   - Consult OT/PT to assist with strengthening/mobility   - Keep Call bell within reach  - Keep bed low and locked with side rails adjusted as appropriate  - Keep care items and personal belongings within reach  - Initiate and maintain comfort rounds  - Make Fall Risk Sign visible to staff  - Offer Toileting every 2 Hours, in advance of need  - Initiate/Maintain bed alarm  - Obtain necessary fall risk management equipment: alarm  - Apply yellow socks and bracelet for high fall risk patients  - Consider moving patient to room near nurses station  Outcome: Progressing     Problem: DISCHARGE PLANNING  Goal: Discharge to home or other facility with appropriate resources  Description: INTERVENTIONS:  - Identify barriers to discharge w/patient and caregiver  - Arrange for needed discharge resources and transportation as appropriate  - Identify discharge learning needs (meds, wound care, etc.)  - Arrange for interpretive services to assist at discharge as needed  - Refer to Case Management Department for coordinating discharge planning if the patient needs post-hospital services based on physician/advanced practitioner order or complex needs related to functional status, cognitive ability, or social support system  Outcome: Progressing     Problem: Knowledge Deficit  Goal: Patient/family/caregiver demonstrates understanding of disease process, treatment plan, medications, and discharge instructions  Description: Complete learning assessment and assess knowledge base.   Interventions:  - Provide teaching at level of understanding  - Provide teaching via preferred learning methods  Outcome: Progressing     Problem: CARDIOVASCULAR - ADULT  Goal: Maintains optimal cardiac output and hemodynamic stability  Description: INTERVENTIONS:  - Monitor I/O, vital signs and rhythm  - Monitor for S/S and trends of decreased cardiac output  - Administer and titrate ordered vasoactive medications to optimize hemodynamic stability  - Assess quality of pulses, skin color and temperature  - Assess for signs of decreased coronary artery perfusion  - Instruct patient to report change in severity of symptoms  Outcome: Progressing  Goal: Absence of cardiac dysrhythmias or at baseline rhythm  Description: INTERVENTIONS:  - Continuous cardiac monitoring, vital signs, obtain 12 lead EKG if ordered  - Administer antiarrhythmic and heart rate control medications as ordered  - Monitor electrolytes and administer replacement therapy as ordered  Outcome: Progressing     Problem: GASTROINTESTINAL - ADULT  Goal: Maintains or returns to baseline bowel function  Description: INTERVENTIONS:  - Assess bowel function  - Encourage oral fluids to ensure adequate hydration  - Administer IV fluids if ordered to ensure adequate hydration  - Administer ordered medications as needed  - Encourage mobilization and activity  - Consider nutritional services referral to assist patient with adequate nutrition and appropriate food choices  Outcome: Progressing

## 2023-08-05 NOTE — PROGRESS NOTES
INTERNAL MEDICINE RESIDENCY SENIOR ADMISSION NOTE     Name: Heather Seymour   Age & Sex: 80 y.o. female   MRN: [id number]  Unit/Bed#: -01   Encounter: [id number]  Primary Care Provider: Pari Parmar MD    Admit to team: SOD Team C     Patient seen and examined. Reviewed H&P per Dr. Maye Payne . Agree with the assessment and plan with any exception/addition as noted below:    Principal Problem:    Sinus pause  Active Problems:    Paroxysmal A-fib (HCC)    Hypertension, essential    Stage 3a chronic kidney disease (HCC)    Syncope and collapse    Hyponatremia    Sinus pain    Lung nodule seen on imaging study    Multinodular goiter    1. Pre-syncope with sinus pause  Patient observed to have sinus pauses on telemetry at [facility] normal, lipid panel unremarkable  - NPO midnight for EP ppm placement    2. Paroxysmal atrial fibrillation  Continue eliquis 5 mg twice daily  Hold BB (at home, takes 25 mg Toprol-XL) given sinus pauses    3. Hypertension  Holding home lisinopril HCTZ 10/6.25 mg daily tablet  Patient previously on lisinopril 5 mg p.o. daily while at [address]  Will increase to 10 mg daily lisinopril starting a.m. of 8/5/2023  Holding metoprolol  Holding thiazide for syncope  Once ppm in place, can resume HCTZ at 6.25 mg po qd    4. Hyponatremia  Suspect 2/2 dehydration  Continue gentle fluids PRN and encourage po intake      5. CKD Stage 3  Recent Labs     08/03/23  1306 08/04/23  0454   CREATININE 1.05 0.93   EGFR 47 55     Estimated Creatinine Clearance: 42.2 mL/min (by C-G formula based on SCr of 0.93 mg/dL). Currently at baseline Cr 0.9-1.0  Avoid nephrotoxins    6. History of CVA  Recent lipid panel 8/1/2023 unremarkable  Continue atorvastatin 20 mg, aspirin  ---------------------------------  HPI  59-year-old female past medical history HTN, CKD 3A, PAF on Eliquis, who presents from home for syncope.      Patient reports experiencing lightheadedness/dizziness and fall with brief loss of consciousness and short-term syncope lasting several seconds without postictal phase on day of admission to outside campus. For the past week, patient has been feeling general weakness/fatigue and an overall feeling of malaise. She had recently been given amoxicillin for presumed sinusitis in the outpatient setting and chronically takes beta-blockers and Eliquis for A-fib. Patient admits to having multiple falls with loss of consciousness without any prodrome multiple times in the past few months. At the [facility] ED, CT head was negative for intracranial abnormality. There was mild scalp swelling without underlying fracture. C-spine and CT reconstruction thoracic was negative for fracture/trauma. Patient was observed to have multiple sinus pauses on telemetry and was evaluated by cardiology, who recommended transfer to [facility] for pacemaker placement. On interview, patient reports feeling no recurrence of syncope since her admission to [facility] and upon transfer to [facility]. She denies fevers/chills, chest pain, shortness of breath, heart palpitations, nausea, vomiting, abdominal pain, weakness, or numbness. ---------------------  Objective  /93   Pulse 88   Ht 5' 3" (1.6 m)   Wt 81.3 kg (179 lb 3.7 oz)   SpO2 95%   BMI 31.75 kg/m²     Physical Exam  Vitals reviewed. Constitutional:       General: She is not in acute distress. Appearance: She is obese. HENT:      Head: Normocephalic. Mouth/Throat:      Mouth: Mucous membranes are moist.      Pharynx: Oropharynx is clear. Eyes:      General: No scleral icterus. Extraocular Movements: Extraocular movements intact. Cardiovascular:      Rate and Rhythm: Normal rate and regular rhythm. Heart sounds: No murmur heard. No friction rub. No gallop. Pulmonary:      Effort: Pulmonary effort is normal. No respiratory distress. Breath sounds: No stridor. No wheezing or rales.    Abdominal: General: Bowel sounds are normal. There is distension (obese). Palpations: There is no mass. Tenderness: There is no abdominal tenderness. There is no guarding. Musculoskeletal:         General: Normal range of motion. Right lower leg: No edema. Left lower leg: No edema. Skin:     General: Skin is warm and dry. Capillary Refill: Capillary refill takes less than 2 seconds. Findings: No rash. Neurological:      Mental Status: She is alert and oriented to person, place, and time. Sensory: No sensory deficit. Psychiatric:         Mood and Affect: Mood normal.         Behavior: Behavior normal.         Thought Content: Thought content normal.      Comments: Calm and cooperative       Recent Labs     08/03/23  1306 08/04/23  0454   WBC 10.67* 9.71     Recent Labs     08/03/23  1306 08/04/23  0454   HGB 14.4 13.1     Recent Labs     08/03/23  1306 08/04/23  0454    198     Recent Labs     08/03/23  1306 08/04/23  0454   SODIUM 128* 129*     Recent Labs     08/03/23  1306 08/04/23  0454 08/04/23  2300   K 4.5 3.6  --    MG  --  1.8* 2.0     Recent Labs     08/03/23  1306 08/04/23  0454   CREATININE 1.05 0.93   EGFR 47 55     Estimated Creatinine Clearance: 42.2 mL/min (by C-G formula based on SCr of 0.93 mg/dL). August 3, 2023 studies:  CT head without contrast  No acute intracranial abnormality. Mild scalp swelling at the midline vertex without underlying fracture. CT C-spine Noncon  No cervical spine fracture or traumatic malalignment.     CT chest with contrast  No acute posttraumatic abnormality. 1.1 cm left lower lobe nodule. No prior studies. Based on current Fleischner Society 2017 Guidelines on incidental pulmonary nodule, either PET/CT scan evaluation, tissue sampling or short term interval followup non-contrast CT followup (initially in 3   months) may be considered appropriate.     Multinodular goiter. Thyroid ultrasound recommended.     --------      Code Status: Level 3 - DNAR and DNI  Admission Status: INPATIENT   Disposition: Patient requires Med/Surg with Telemetry  Expected Length of Stay: >2 midnights     Richard Souza MD   PGY-3 Internal Medicine  [address] [place] [address]

## 2023-08-05 NOTE — ASSESSMENT & PLAN NOTE
Lab Results   Component Value Date    EGFR 55 08/04/2023    EGFR 47 08/03/2023    EGFR 47 03/23/2021    CREATININE 0.93 08/04/2023    CREATININE 1.05 08/03/2023    CREATININE 1.07 03/23/2021     -Avoid nephrotoxic agents

## 2023-08-05 NOTE — PLAN OF CARE
Problem: MOBILITY - ADULT  Goal: Maintain or return to baseline ADL function  Description: INTERVENTIONS:  -  Assess patient's ability to carry out ADLs; assess patient's baseline for ADL function and identify physical deficits which impact ability to perform ADLs (bathing, care of mouth/teeth, toileting, grooming, dressing, etc.)  - Assess/evaluate cause of self-care deficits   - Assess range of motion  - Assess patient's mobility; develop plan if impaired  - Assess patient's need for assistive devices and provide as appropriate  - Encourage maximum independence but intervene and supervise when necessary  - Involve family in performance of ADLs  - Assess for home care needs following discharge   - Consider OT consult to assist with ADL evaluation and planning for discharge  - Provide patient education as appropriate  Outcome: Progressing  Goal: Maintains/Returns to pre admission functional level  Description: INTERVENTIONS:  - Perform BMAT or MOVE assessment daily.   - Set and communicate daily mobility goal to care team and patient/family/caregiver. - Collaborate with rehabilitation services on mobility goals if consulted  - Perform Range of Motion 2 times a day. - Reposition patient every 2 hours.   - Record patient progress and toleration of activity level   Outcome: Progressing

## 2023-08-05 NOTE — H&P
INTERNAL MEDICINE RESIDENCY ADMISSION H&P     Name: Holden Hoffman   Age & Sex: 80 y.o. female   MRN: [id number]  Unit/Bed#: -01   Encounter: [id number]   Primary Care Provider: Nigel Rios MD    Code Status: Level 3 - DNAR and DNI  Admission Status: INPATIENT   Disposition: Patient requires Med/Surg with Telemetry    Admit to team: SOD Team C     ASSESSMENT/PLAN     Principal Problem:    Sinus pause  Active Problems:    Paroxysmal A-fib ([address])    Hypertension, essential    Syncope and collapse    Combined hyperlipidemia    Stage 3a chronic kidney disease (HCC)    Hyponatremia    Sinus pain    Lung nodule seen on imaging study    Multinodular goiter      * Sinus pause  Assessment & Plan  Pt  presented to [facility] [place] with syncopal event. Pt reports feeling "funny" and lightheaded and fell on her knees and hit her head on the wall. Pt states that she lost consciousness for under 30 seconds and called her neighbor and they brought her to [facility] [place] ED. CT head unremarkable, showed mild midline scalp swelling. CT C and T spine negative. On tele was found to have sinus pauses and bradycardia in the 40s. She was transferred to [facility] for EP evaluation. TTE done today at [facility] [place] showed EF of 65%  - Hold home metoprolol  - Continuous Tele, pacer pads on patient  - NPO [place] pacemaker [place]   - Lyme disease pending    Syncope and collapse  Assessment & Plan  Pt  presented to [facility] [place] with syncopal event. Pt reports feeling "funny" and lightheaded and fell on her knees and hit her head on the wall. Pt states that she lost consciousness for under 30 seconds and called her neighbor and they brought her to [facility] [place] ED. CT head unremarkable, showed mild midline scalp swelling. CT C and T spine negative. On tele was found to have sinus pauses and bradycardia in the 40s. She was transferred to [facility] for EP evaluation.  TTE done today at [address] [address] [place] showed EF of 65%  - Hold home metoprolol  - Lidocaine patch for back pain  - Tylenol prn      Hypertension, essential  Assessment & Plan  - Hold home lisinopril/hctz and metoprolol given presyncopal event  - Was on Lisinopril 5 mg daily at [address] [address] [place]. - Increase to lisinopril 10 mg daily starting 8/5  - Monitor BP    Paroxysmal A-fib (HCC)  Assessment & Plan  - Hold home eliquis  - Heparin gtt  - Hold home metoprolol due to bradycardia/sinus pauses    Multinodular goiter  Assessment & Plan  CT Chest 8/32023 showed multinodular goiter of thyroid. - TSH normal from 8/3/2023  - Thyroid U/S in outpatient setting upon DC    Lung nodule seen on imaging study  Assessment & Plan  CT Chest 8/3/2023 showed. "No acute posttraumatic abnormality.     1.1 cm left lower lobe nodule. No prior studies. Based on current Fleischner Society 2017 Guidelines on incidental pulmonary nodule, either PET/CT scan evaluation, tissue sampling or short term interval followup non-contrast CT followup (initially in 3   months) may be considered appropriate.     Multinodular goiter. Thyroid ultrasound recommended. "    - Found incidentally on imaging. Follow-up in outpatient setting upon DC    Sinus pain  Assessment & Plan  Pt saw her pcp on 8/1 for sinus congestion/pain. Her PCP started her on amoxicillin which she took for 3 days. At [facility] she was transitioned to doxycycline.   - Continue doxycycline    Hyponatremia  Assessment & Plan  Sodium on admission at [address] [address] [place] was 128 and improved to 129 today.    - CMP, monitor sodium  - maintenance fluids    Stage 3a chronic kidney disease [facility])  Assessment & Plan  Lab Results   Component Value Date    EGFR 55 08/04/2023    EGFR 47 08/03/2023    EGFR 47 03/23/2021    CREATININE 0.93 08/04/2023    CREATININE 1.05 08/03/2023    CREATININE 1.07 03/23/2021     -Avoid nephrotoxic agents    Combined hyperlipidemia  Assessment & Plan  - Continue home atorvastatin 20 mg daily      VTE Pharmacologic Prophylaxis: Heparin  VTE Mechanical Prophylaxis: sequential compression device    CHIEF COMPLAINT   No chief complaint on file. HISTORY OF PRESENT ILLNESS     79 y/o female with a PMHx of HTN, CKD3a, Afib on eliquis who presented from home to [address] [facility] with syncopal event yesterday. Pt reports feeling "funny" and lightheaded and fell on her knees and hit her head on the wall. Pt states that she lost consciousness for under 30 seconds and called her neighbor and they brought her to [address] [facility] ED. CT head unremarkable, showed mild midline scalp swelling. CT C and T spine negative. On tele was found to have sinus pauses and bradycardia in the 40s. She was transferred to [facility] for EP evaluation. TTE done today at [address] [facility] showed EF of 65%    Pt reports the "funny feeling" she has been having has been going on for a week. She also reports sinus pain and congestion and went to her PCP on 8/1 and was prescribed amoxicillin which she took for 3 days. Pt reports she had elbow pain from the fall that has since resolved. Pt denies CP, SOB, dizziness, lightheadedness, headache, nausea, and vomiting. REVIEW OF SYSTEMS     Review of Systems   Constitutional: Negative for chills, diaphoresis and fever. HENT: Positive for congestion and sinus pain. Negative for rhinorrhea. Eyes: Negative for photophobia, pain, redness and visual disturbance. Respiratory: Negative for cough, chest tightness and shortness of breath. Cardiovascular: Negative for chest pain, palpitations and leg swelling. Gastrointestinal: Negative for abdominal distention, abdominal pain and nausea. Endocrine: Negative for polydipsia and polyuria. Genitourinary: Negative for dysuria, frequency and urgency. Musculoskeletal: Positive for back pain. Skin: Positive for wound. Negative for rash. Neurological: Positive for syncope. Negative for dizziness and headaches. Psychiatric/Behavioral: Negative for behavioral problems and confusion. OBJECTIVE     Vitals:    23 2305 23 2345 23 0017   BP: (!) 177/101 167/93 166/89   Pulse: 81 88 90   Resp:   17   Temp:   98.5 °F (36.9 °C)   SpO2: 95%  95%   Weight:  81.3 kg (179 lb 3.7 oz)    Height:  5' 3" (1.6 m)       Temperature:   Temp (24hrs), Av.5 °F (36.9 °C), Min:97.9 °F (36.6 °C), Max:99.1 °F (37.3 °C)    Temperature: 98.5 °F (36.9 °C)  Intake & Output:  I/O     None        Weights:   IBW (Ideal Body Weight): 52.4 kg    Body mass index is 31.75 kg/m². Weight (last 2 days)     Date/Time Weight    23 2345 81.3 (179.23)        Physical Exam  Constitutional:       General: She is not in acute distress. Appearance: Normal appearance. She is obese. She is not ill-appearing. HENT:      Head: Normocephalic and atraumatic. Nose: Nose normal. No rhinorrhea. Mouth/Throat:      Mouth: Mucous membranes are moist.      Pharynx: Oropharynx is clear. Eyes:      Extraocular Movements: Extraocular movements intact. Conjunctiva/sclera: Conjunctivae normal.      Pupils: Pupils are equal, round, and reactive to light. Cardiovascular:      Rate and Rhythm: Normal rate and regular rhythm. Pulses: Normal pulses. Heart sounds: Normal heart sounds. Pulmonary:      Effort: Pulmonary effort is normal.      Breath sounds: Normal breath sounds. Abdominal:      General: Abdomen is flat. Bowel sounds are normal. There is no distension. Palpations: Abdomen is soft. Tenderness: There is no abdominal tenderness. Musculoskeletal:         General: No swelling or tenderness. Normal range of motion. Cervical back: Normal range of motion and neck supple. Skin:     General: Skin is warm and dry. Neurological:      General: No focal deficit present. Mental Status: She is alert and oriented to person, place, and time.    Psychiatric:         Mood and Affect: Mood normal. Behavior: Behavior normal.       PAST MEDICAL HISTORY     Past Medical History:   Diagnosis Date   • PMR (polymyalgia rheumatica) (HCC)    • Stroke ([address])      PAST SURGICAL HISTORY     Past Surgical History:   Procedure Laterality Date   • IR AORTAGRAM WITH RUN-OFF  3/9/2021   • REPLACEMENT TOTAL KNEE       SOCIAL & FAMILY HISTORY     Social History     Substance and Sexual Activity   Alcohol Use No     Substance and Sexual Activity   Alcohol Use No        Substance and Sexual Activity   Drug Use No     Social History     Tobacco Use   Smoking Status Never   • Passive exposure: Never   Smokeless Tobacco Never     No family history on file. LABORATORY DATA     Labs: I have personally reviewed pertinent reports. Results from last 7 days   Lab Units 08/04/23  0454 08/03/23  1306   WBC Thousand/uL 9.71 10.67*   HEMOGLOBIN g/dL 13.1 14.4   HEMATOCRIT % 39.0 42.5   PLATELETS Thousands/uL 198 207   NEUTROS PCT % 70 89*   MONOS PCT % 10 5   EOS PCT % 2 0      Results from last 7 days   Lab Units 08/04/23  0454 08/03/23  1306   POTASSIUM mmol/L 3.6 4.5   CHLORIDE mmol/L 97 94*   CO2 mmol/L 25 25   BUN mg/dL 17 23   CREATININE mg/dL 0.93 1.05   CALCIUM mg/dL 9.0 9.6   ALK PHOS U/L 41  --    ALT U/L 12  --    AST U/L 18  --      Results from last 7 days   Lab Units 08/04/23  2300 08/04/23  0454   MAGNESIUM mg/dL 2.0 1.8*          Results from last 7 days   Lab Units 08/04/23  2300 08/04/23  1649 08/04/23  1035   INR  1.19  --  1.21*   PTT seconds 77* 73* 35             Micro:  No results found for: "BLOODCX", "Franne Jatin", "WOUNDCULT", "SPUTUMCULTUR"  IMAGING & DIAGNOSTIC TESTS     Imaging: I have personally reviewed pertinent reports. No results found. EKG, Pathology, and Other Studies: I have personally reviewed pertinent reports.      ALLERGIES     Allergies   Allergen Reactions   • Hydrocodone    • Oxycodone Anxiety and Headache     MEDICATIONS PRIOR TO ARRIVAL     Prior to Admission medications    Medication Sig Start Date End Date Taking? Authorizing Provider   acetaminophen-codeine (TYLENOL #3) 300-30 mg per tablet  2/25/21   Historical Provider, MD   aspirin (ECOTRIN LOW STRENGTH) 81 mg EC tablet Take 81 mg by mouth daily    Historical Provider, MD   atorvastatin (LIPITOR) 20 mg tablet Take 1 tablet by mouth daily    Historical Provider, MD   cholecalciferol (VITAMIN D3) 400 units tablet Take 400 Units by mouth daily    Historical Provider, MD   ELIQUIS 5 MG Take 5 mg by mouth 2 (two) times a day 12/20/17   Historical Provider, MD   fluticasone (FLONASE) 50 mcg/act nasal spray SPRAY 2 SPRAYS INTO EACH NOSTRIL EVERY DAY 4/19/20   Historical Provider, MD   gabapentin (NEURONTIN) 300 mg capsule Take 300 mg by mouth daily at bedtime 4/28/22   Historical Provider, MD   lisinopril-hydrochlorothiazide (PRINZIDE,ZESTORETIC) 20-12.5 MG per tablet Take 0.5 tablets by mouth daily    Historical Provider, MD   loratadine (CLARITIN) 10 mg tablet Take 10 mg by mouth daily 12/7/17   Historical Provider, MD   metoprolol succinate (TOPROL-XL) 25 mg 24 hr tablet Take 1 tablet (25 mg total) by mouth daily Take 1 tablet daily 6/29/20   Angie Crystal MD   predniSONE 10 mg tablet Take 5 mg by mouth daily     Historical Provider, MD   Sennosides (LAXATIVE PILLS PO) Take 1 tablet by mouth Twice daily    Historical Provider, MD   Sodium Fluoride 5000 PPM 1.1 % GEL BRUSH TWICE DAILY 2/22/22   Historical Provider, MD   traMADol (ULTRAM) 50 mg tablet TAKE 1 TAB BY MOUTH 2 TIMES A DAY AS NEEDED FOR PAIN, SEVERE.   Patient not taking: Reported on 8/4/2023 3/26/20   Historical Provider, MD     MEDICATIONS ADMINISTERED IN LAST 24 HOURS     Medication Administration - last 24 hours from 08/04/2023 0050 to 08/05/2023 0050       Date/Time Order Dose Route Action Action by     08/04/2023 2306 EDT fluticasone (FLONASE) 50 mcg/act nasal spray 1 spray 1 spray Each Nare Not Given Karina Mace RN     08/04/2023 2303 EDT gabapentin (NEURONTIN) capsule 300 mg 300 mg Oral Given Roxanna Forrest RN     08/04/2023 2303 EDT melatonin tablet 6 mg 6 mg Oral Given Roxanna Forrest RN     08/04/2023 2303 EDT senna-docusate sodium (SENOKOT S) 8.6-50 mg per tablet 1 tablet 1 tablet Oral Given Roxanna Forrest RN     08/04/2023 2305 EDT sodium chloride 0.9 % infusion 50 mL/hr Intravenous [address] [place], [place]     08/04/2023 2304 EDT heparin (porcine) 25,000 units in 0.45% NaCl 250 mL infusion (premix) 12 Units/kg/hr Intravenous [address] [place], [place]     08/04/2023 2312 EDT hydrALAZINE (APRESOLINE) injection 5 mg 5 mg Intravenous Given Roxanna Forrest RN     08/05/2023 0014 EDT lidocaine (LIDODERM) 5 % patch 1 patch 1 patch Topical Medication Applied Roxanna Forrest RN        CURRENT MEDICATIONS     Current Facility-Administered Medications   Medication Dose Route Frequency Provider Last Rate   • acetaminophen  650 mg Oral Q6H PRN Dre Mojgan, DO     • aspirin  81 mg Oral Daily Dre Mojgan, DO     • atorvastatin  20 mg Oral Daily Dre Mojgan, DO     • doxycycline hyclate  100 mg Oral Q12H [address] [facility], DO     • fluticasone  1 spray Each Nare BID Dre Mojgan, DO     • gabapentin  300 mg Oral HS Dre Mojgan, DO     • heparin (porcine)  3-20 Units/kg/hr (Order-Specific) Intravenous Titrated Dre Mojgan, DO 12 Units/kg/hr (08/04/23 2304)   • heparin (porcine)  2,000 Units Intravenous Q6H PRN Dre Mojgan, DO     • heparin (porcine)  4,000 Units Intravenous Q6H PRN Dre Mojgan, DO     • lidocaine  1 patch Topical Daily Doss Ragab, DO     • lisinopril  10 mg Oral Daily Alexis Chávez MD     • melatonin  6 mg Oral HS Dre Mojgan, DO     • senna-docusate sodium  1 tablet Oral BID Dre Mojgan, DO     • sodium chloride  50 mL/hr Intravenous Continuous Dre Mojgan, DO 50 mL/hr (08/04/23 2305)     heparin (porcine), 3-20 Units/kg/hr (Order-Specific), Last Rate: 12 Units/kg/hr (08/04/23 2304)  sodium chloride, 50 mL/hr, Last Rate: 50 mL/hr (08/04/23 2305)      acetaminophen, 650 mg, Q6H PRN  heparin (porcine), 2,000 Units, Q6H PRN  heparin (porcine), 4,000 Units, Q6H PRN        Admission Time  I spent 30 minutes admitting the patient. This involved direct patient contact where I performed a full history and physical, reviewing previous records, and reviewing laboratory and other diagnostic studies. Portions of the record may have been created with voice recognition software. Occasional wrong word or "sound a like" substitutions may have occurred due to the inherent limitations of voice recognition software.   Read the chart carefully and recognize, using context, where substitutions have occurred.    ==  Jose Sandy, [address] [facility]  Internal Medicine Residency PGY-1

## 2023-08-05 NOTE — ASSESSMENT & PLAN NOTE
Pt saw her pcp on 8/1 for sinus congestion/pain. Her PCP started her on amoxicillin which she took for 3 days. At [facility] she was transitioned to doxycycline (3 days). - Dc'ed doxycycline on 8/7- no further indication for treatment.

## 2023-08-05 NOTE — PROGRESS NOTES
INTERNAL MEDICINE RESIDENCY PROGRESS NOTE     Name: Yuliana Liz   Age & Sex: 80 y.o. female   MRN: [id number]  Unit/Bed#: -01   Encounter: [id number]  Team: SOD Team C     PATIENT INFORMATION     Name: Yuliana Liz   Age & Sex: 80 y.o. female   MRN: [id number]  Hospital Stay Days: 1    ASSESSMENT/PLAN     Principal Problem:    Sinus pause  Active Problems:    Syncope and collapse    Paroxysmal A-fib (HCC)    Hypertension, essential    Combined hyperlipidemia    Stage 3a chronic kidney disease (HCC)    Hyponatremia    Sinus pain    Lung nodule seen on imaging study    Multinodular goiter      * Sinus pause  Assessment & Plan  Pt  presented to [address] [facility] with syncopal event. Pt reports feeling "funny" and lightheaded and fell on her knees and hit her head on the wall. Pt states that she lost consciousness for under 30 seconds and called her neighbor and they brought her to [address] [place] [address] ED. CT head unremarkable, showed mild midline scalp swelling. Does complain of some lower back pain s/p fall. CT C and T spine negative. On tele was found to have sinus pauses and bradycardia in the 40s. She was transferred to [address] [place] [address] for EP evaluation. TTE done today at [id number] [place] [address] showed EF of 65%. K at 3.2 and Mg at 1.9 on 08/05. Plan:  · Hold home metoprolol  · Continuous Tele, pacer pads on patient  · Appeared to have large PVC burden on tele  · Maintain K>4, Mg>2, both repleted today  · EP consult  · Will have dual chamber PPM placed on Monday  · Re-advanced diet, NPO at midnight tomorrow  · Lyme disease pending    Syncope and collapse  Assessment & Plan  See Assessment for "Sinus pause"    Plan:  · Lidocaine patch for back pain  · Tylenol prn    Paroxysmal A-fib (HCC)  Assessment & Plan  Rate controlled. Home eliquis.     Plan:  · Was initially holding home eliquis for possible pacemaker placement today, but procedure will take place Monday, per EP  · Restart home eliquis, will hold nighttime dose tomorrow and morning dose on the day of procedure  · Hold home metoprolol due to bradycardia/sinus pauses    Hypertension, essential  Assessment & Plan  Home meds include, lisinopril/HCTZ and metoprolol. Plan:  · Holding home meds at this time  · Was on lisinopril 5 mg at [place]  · Increased to home lisinopril 10 mg today  · Became hypertensive to SBPs > 180  · Was given one-time dose of hydralazine with improvement in BP  · BP has been stable since  · Continue BP monitoring    Combined hyperlipidemia  Assessment & Plan  - Continue home atorvastatin 20 mg daily    Stage 3a chronic kidney disease [facility])  Assessment & Plan  Lab Results   Component Value Date    EGFR 55 08/04/2023    EGFR 47 08/03/2023    EGFR 47 03/23/2021    CREATININE 0.93 08/04/2023    CREATININE 1.05 08/03/2023    CREATININE 1.07 03/23/2021     -Avoid nephrotoxic agents    Hyponatremia  Assessment & Plan  Sodium on admission at [address] [address] was 128 and improved to 129, now at 133, likely 2/2 dehydration. Plan:  · CMP, monitor sodium  · Maintenance fluids    Lung nodule seen on imaging study  Assessment & Plan  CT Chest 8/3/2023 showed. "No acute posttraumatic abnormality.     1.1 cm left lower lobe nodule. No prior studies. Based on current Fleischner Society 2017 Guidelines on incidental pulmonary nodule, either PET/CT scan evaluation, tissue sampling or short term interval followup non-contrast CT followup (initially in 3   months) may be considered appropriate.     Multinodular goiter. Thyroid ultrasound recommended. "    - Found incidentally on imaging. Follow-up in outpatient setting upon DC    Sinus pain  Assessment & Plan  Pt saw her pcp on 8/1 for sinus congestion/pain. Her PCP started her on amoxicillin which she took for 3 days. At [address] [address] she was transitioned to doxycycline.   - Continue doxycycline    Multinodular goiter  Assessment & Plan  CT Chest 8/32023 showed multinodular goiter of thyroid.   - TSH normal from 8/3/2023  - Thyroid U/S in outpatient setting upon DC    Disposition:  Pending pacemaker placement on Monday. SUBJECTIVE     Patient seen and examined. No acute events overnight. Pt reports having some back pain this morning and that she was feeling hungry. She states her elbow wound feels better since it had been bandaged. Otherwise denied any other sx this morning including, CP, SOB, light-headedness, hot flashes, and lower extremity swelling. OBJECTIVE     Vitals:    23 0017 23 0226 23 0500 23 0712   BP: 166/89 140/65  136/67   BP Location:    Left arm   Pulse: 90 86  103   Resp: 17   17   Temp: 98.5 °F (36.9 °C)   98.4 °F (36.9 °C)   TempSrc:    Oral   SpO2: 95% 96%  96%   Weight:   80.7 kg (177 lb 14.6 oz)    Height:          Temperature:   Temp (24hrs), Av.7 °F (37.1 °C), Min:98.4 °F (36.9 °C), Max:99.1 °F (37.3 °C)    Temperature: 98.4 °F (36.9 °C)  Intake & Output:  I/O       / 0701   0700  0701   07 0701   0700    P. O.  0 120    Total Intake(mL/kg)  0 (0) 120 (1.5)    Urine (mL/kg/hr)  600 300 (0.8)    Total Output  600 300    Net  -600 -180               Weights:   IBW (Ideal Body Weight): 52.4 kg    Body mass index is 31.52 kg/m². Weight (last 2 days)     Date/Time Weight    23 0500 80.7 (177.91)    23 2345 81.3 (179.23)        Physical Exam  Vitals reviewed. Constitutional:       General: She is not in acute distress. Appearance: She is obese. She is not ill-appearing. HENT:      Head: Normocephalic and atraumatic. Mouth/Throat:      Mouth: Mucous membranes are moist.      Pharynx: Oropharynx is clear. Eyes:      Conjunctiva/sclera: Conjunctivae normal.      Pupils: Pupils are equal, round, and reactive to light. Neck:      Vascular: No carotid bruit. Cardiovascular:      Rate and Rhythm: Normal rate. Rhythm irregular. Pulses: Normal pulses. Heart sounds: Normal heart sounds.  No murmur heard.     No friction rub. No gallop. Pulmonary:      Effort: Pulmonary effort is normal. No respiratory distress. Breath sounds: Normal breath sounds. No wheezing. Abdominal:      General: Abdomen is flat. Palpations: Abdomen is soft. Musculoskeletal:         General: Signs of injury (R elbow laceration) present. Cervical back: Normal range of motion. Skin:     General: Skin is warm and dry. Capillary Refill: Capillary refill takes less than 2 seconds. Coloration: Skin is not pale. Findings: Bruising (Across UE) present. Comments: Minor scalp bruise noted with no active bleeding. Neurological:      Mental Status: She is alert and oriented to person, place, and time. Mental status is at baseline. Cranial Nerves: No cranial nerve deficit. Psychiatric:         Mood and Affect: Mood normal.         Behavior: Behavior normal.         Thought Content: Thought content normal.       LABORATORY DATA     Labs: I have personally reviewed pertinent reports.   Results from last 7 days   Lab Units 08/05/23  0503 08/04/23  0454 08/03/23  1306   WBC Thousand/uL 9.63 9.71 10.67*   HEMOGLOBIN g/dL 13.7 13.1 14.4   HEMATOCRIT % 41.4 39.0 42.5   PLATELETS Thousands/uL 192 198 207   NEUTROS PCT % 77* 70 89*   MONOS PCT % 9 10 5   EOS PCT % 1 2 0      Results from last 7 days   Lab Units 08/05/23  0503 08/04/23  0454 08/03/23  1306   POTASSIUM mmol/L 3.2* 3.6 4.5   CHLORIDE mmol/L 99 97 94*   CO2 mmol/L 25 25 25   BUN mg/dL 10 17 23   CREATININE mg/dL 0.86 0.93 1.05   CALCIUM mg/dL 8.5 9.0 9.6   ALK PHOS U/L 50 41  --    ALT U/L 19 12  --    AST U/L 21 18  --      Results from last 7 days   Lab Units 08/05/23  0503 08/04/23  2300 08/04/23  0454   MAGNESIUM mg/dL 1.9 2.0 1.8*          Results from last 7 days   Lab Units 08/05/23  0503 08/04/23  2300 08/04/23  1649 08/04/23  1035   INR   --  1.19  --  1.21*   PTT seconds 74* 77* 73* 35               IMAGING & DIAGNOSTIC TESTING Radiology Results: I have personally reviewed pertinent reports. No results found. Other Diagnostic Testing: I have personally reviewed pertinent reports. ACTIVE MEDICATIONS     Current Facility-Administered Medications   Medication Dose Route Frequency   • acetaminophen (TYLENOL) tablet 650 mg  650 mg Oral Q6H PRN   • apixaban (ELIQUIS) tablet 5 mg  5 mg Oral BID   • aspirin (ECOTRIN LOW STRENGTH) EC tablet 81 mg  81 mg Oral Daily   • atorvastatin (LIPITOR) tablet 20 mg  20 mg Oral Daily   • doxycycline hyclate (VIBRAMYCIN) capsule 100 mg  100 mg Oral Q12H TREVA   • fluticasone (FLONASE) 50 mcg/act nasal spray 1 spray  1 spray Each Nare BID   • gabapentin (NEURONTIN) capsule 300 mg  300 mg Oral HS   • lidocaine (LIDODERM) 5 % patch 1 patch  1 patch Topical Daily   • lisinopril (ZESTRIL) tablet 10 mg  10 mg Oral Daily   • melatonin tablet 6 mg  6 mg Oral HS   • polyethylene glycol (MIRALAX) packet 17 g  17 g Oral Daily   • senna-docusate sodium (SENOKOT S) 8.6-50 mg per tablet 1 tablet  1 tablet Oral BID       VTE Pharmacologic Prophylaxis: Reason for no pharmacologic prophylaxis on Eliquis  VTE Mechanical Prophylaxis: sequential compression device    Portions of the record may have been created with voice recognition software. Occasional wrong word or "sound a like" substitutions may have occurred due to the inherent limitations of voice recognition software.   Read the chart carefully and recognize, using context, where substitutions have occurred.  ==  Casi Ghosh MD  [facility]  Internal Medicine Residency PGY-1

## 2023-08-05 NOTE — ASSESSMENT & PLAN NOTE
CT Chest 8/3/2023 showed multinodular goiter of thyroid.   - TSH normal from 8/3/2023  - Thyroid U/S in outpatient setting upon DC

## 2023-08-05 NOTE — ASSESSMENT & PLAN NOTE
Home meds include, lisinopril/HCTZ and metoprolol.     Plan:  · Was on lisinopril 5 mg at [place]  · Increased to home lisinopril 10 mg   · Became hypertensive to SBPs > 180  · Was given one-time dose of hydralazine with improvement in BP  · BP has been stable since  · Continue BP monitoring

## 2023-08-05 NOTE — ASSESSMENT & PLAN NOTE
Rate controlled. Home eliquis. Plan:  · Was initially holding home eliquis for possible pacemaker placement today, but procedure will take place Monday, per EP  · Restart home eliquis, will hold nighttime dose tomorrow and morning dose on the day of procedure (held on night of 8/6 and AM 8/7)  · Back on eliquis today  · Hold home metoprolol due to bradycardia/sinus pauses  · Back on metoprolol today (8/8/23).

## 2023-08-05 NOTE — ASSESSMENT & PLAN NOTE
CT Chest 8/3/2023 showed. "No acute posttraumatic abnormality.     1.1 cm left lower lobe nodule. No prior studies. Based on current Fleischner Society 2017 Guidelines on incidental pulmonary nodule, either PET/CT scan evaluation, tissue sampling or short term interval followup non-contrast CT followup (initially in 3   months) may be considered appropriate.     Multinodular goiter. Thyroid ultrasound recommended. - Found incidentally on imaging.  Follow-up in outpatient setting upon DC

## 2023-08-06 LAB
ALBUMIN SERPL BCP-MCNC: 3.2 G/DL (ref 3.5–5)
ALP SERPL-CCNC: 54 U/L (ref 46–116)
ALT SERPL W P-5'-P-CCNC: 17 U/L (ref 12–78)
ANION GAP SERPL CALCULATED.3IONS-SCNC: 4 MMOL/L
AST SERPL W P-5'-P-CCNC: 18 U/L (ref 5–45)
BASOPHILS # BLD AUTO: 0.05 THOUSANDS/ÂΜL (ref 0–0.1)
BASOPHILS NFR BLD AUTO: 1 % (ref 0–1)
BILIRUB SERPL-MCNC: 0.85 MG/DL (ref 0.2–1)
BUN SERPL-MCNC: 10 MG/DL (ref 5–25)
CALCIUM ALBUM COR SERPL-MCNC: 9.7 MG/DL (ref 8.3–10.1)
CALCIUM SERPL-MCNC: 9.1 MG/DL (ref 8.3–10.1)
CHLORIDE SERPL-SCNC: 100 MMOL/L (ref 96–108)
CO2 SERPL-SCNC: 28 MMOL/L (ref 21–32)
CREAT SERPL-MCNC: 0.94 MG/DL (ref 0.6–1.3)
EOSINOPHIL # BLD AUTO: 0.14 THOUSAND/ÂΜL (ref 0–0.61)
EOSINOPHIL NFR BLD AUTO: 2 % (ref 0–6)
ERYTHROCYTE [DISTWIDTH] IN BLOOD BY AUTOMATED COUNT: 13.4 % (ref 11.6–15.1)
GFR SERPL CREATININE-BSD FRML MDRD: 54 ML/MIN/1.73SQ M
GLUCOSE SERPL-MCNC: 85 MG/DL (ref 65–140)
HCT VFR BLD AUTO: 45.1 % (ref 34.8–46.1)
HGB BLD-MCNC: 14.8 G/DL (ref 11.5–15.4)
IMM GRANULOCYTES # BLD AUTO: 0.02 THOUSAND/UL (ref 0–0.2)
IMM GRANULOCYTES NFR BLD AUTO: 0 % (ref 0–2)
LYMPHOCYTES # BLD AUTO: 1.07 THOUSANDS/ÂΜL (ref 0.6–4.47)
LYMPHOCYTES NFR BLD AUTO: 14 % (ref 14–44)
MAGNESIUM SERPL-MCNC: 2.2 MG/DL (ref 1.6–2.6)
MCH RBC QN AUTO: 30.5 PG (ref 26.8–34.3)
MCHC RBC AUTO-ENTMCNC: 32.8 G/DL (ref 31.4–37.4)
MCV RBC AUTO: 93 FL (ref 82–98)
MONOCYTES # BLD AUTO: 0.9 THOUSAND/ÂΜL (ref 0.17–1.22)
MONOCYTES NFR BLD AUTO: 12 % (ref 4–12)
NEUTROPHILS # BLD AUTO: 5.66 THOUSANDS/ÂΜL (ref 1.85–7.62)
NEUTS SEG NFR BLD AUTO: 71 % (ref 43–75)
NRBC BLD AUTO-RTO: 0 /100 WBCS
PLATELET # BLD AUTO: 201 THOUSANDS/UL (ref 149–390)
PMV BLD AUTO: 10.6 FL (ref 8.9–12.7)
POTASSIUM SERPL-SCNC: 4.2 MMOL/L (ref 3.5–5.3)
PROT SERPL-MCNC: 6.6 G/DL (ref 6.4–8.4)
RBC # BLD AUTO: 4.86 MILLION/UL (ref 3.81–5.12)
SODIUM SERPL-SCNC: 132 MMOL/L (ref 135–147)
WBC # BLD AUTO: 7.84 THOUSAND/UL (ref 4.31–10.16)

## 2023-08-06 PROCEDURE — 80053 COMPREHEN METABOLIC PANEL: CPT

## 2023-08-06 PROCEDURE — 83735 ASSAY OF MAGNESIUM: CPT

## 2023-08-06 PROCEDURE — 85025 COMPLETE CBC W/AUTO DIFF WBC: CPT

## 2023-08-06 PROCEDURE — 99232 SBSQ HOSP IP/OBS MODERATE 35: CPT | Performed by: INTERNAL MEDICINE

## 2023-08-06 RX ADMIN — FLUTICASONE PROPIONATE 1 SPRAY: 50 SPRAY, METERED NASAL at 17:18

## 2023-08-06 RX ADMIN — ASPIRIN 81 MG: 81 TABLET, COATED ORAL at 08:42

## 2023-08-06 RX ADMIN — DOXYCYCLINE 100 MG: 100 CAPSULE ORAL at 21:48

## 2023-08-06 RX ADMIN — ATORVASTATIN CALCIUM 20 MG: 20 TABLET, FILM COATED ORAL at 08:42

## 2023-08-06 RX ADMIN — APIXABAN 5 MG: 5 TABLET, FILM COATED ORAL at 08:42

## 2023-08-06 RX ADMIN — MELATONIN 6 MG: at 21:48

## 2023-08-06 RX ADMIN — SENNOSIDES AND DOCUSATE SODIUM 1 TABLET: 50; 8.6 TABLET ORAL at 17:18

## 2023-08-06 RX ADMIN — DOXYCYCLINE 100 MG: 100 CAPSULE ORAL at 08:42

## 2023-08-06 RX ADMIN — POLYETHYLENE GLYCOL 3350 17 G: 17 POWDER, FOR SOLUTION ORAL at 08:42

## 2023-08-06 RX ADMIN — FLUTICASONE PROPIONATE 1 SPRAY: 50 SPRAY, METERED NASAL at 08:42

## 2023-08-06 RX ADMIN — LIDOCAINE 1 PATCH: 50 PATCH CUTANEOUS at 08:35

## 2023-08-06 RX ADMIN — GABAPENTIN 300 MG: 300 CAPSULE ORAL at 21:48

## 2023-08-06 RX ADMIN — ACETAMINOPHEN 650 MG: 325 TABLET, FILM COATED ORAL at 17:18

## 2023-08-06 RX ADMIN — LISINOPRIL 10 MG: 10 TABLET ORAL at 08:42

## 2023-08-06 RX ADMIN — SENNOSIDES AND DOCUSATE SODIUM 1 TABLET: 50; 8.6 TABLET ORAL at 08:42

## 2023-08-06 NOTE — PROGRESS NOTES
INTERNAL MEDICINE RESIDENCY PROGRESS NOTE     Name: Desmond Lopez   Age & Sex: 80 y.o. female   MRN: [id number]  Unit/Bed#: -01   Encounter: [id number]  Team: SOD Team C     PATIENT INFORMATION     Name: Desmond Lopez   Age & Sex: 80 y.o. female   MRN: [id number]  Hospital Stay Days: 2    ASSESSMENT/PLAN     Principal Problem:    Sinus pause  Active Problems:    Paroxysmal A-fib ([address])    Hypertension, essential    Combined hyperlipidemia    Stage 3a chronic kidney disease ([address])    Syncope and collapse    Hyponatremia    Sinus pain    Lung nodule seen on imaging study    Multinodular goiter      Multinodular goiter  Assessment & Plan  CT Chest 8/32023 showed multinodular goiter of thyroid. - TSH normal from 8/3/2023  - Thyroid U/S in outpatient setting upon DC    Lung nodule seen on imaging study  Assessment & Plan  CT Chest 8/3/2023 showed. "No acute posttraumatic abnormality.     1.1 cm left lower lobe nodule. No prior studies. Based on current Fleischner Society 2017 Guidelines on incidental pulmonary nodule, either PET/CT scan evaluation, tissue sampling or short term interval followup non-contrast CT followup (initially in 3   months) may be considered appropriate.     Multinodular goiter. Thyroid ultrasound recommended. "    - Found incidentally on imaging. Follow-up in outpatient setting upon DC    Sinus pain  Assessment & Plan  Pt saw her pcp on 8/1 for sinus congestion/pain. Her PCP started her on amoxicillin which she took for 3 days. At [address] [place] she was transitioned to doxycycline.   - Continue doxycycline    Hyponatremia  Assessment & Plan  Sodium on admission at [address] [place] was 128 and improved to 129, now at 133, likely 2/2 dehydration.     Plan:  · CMP, monitor sodium  · Maintenance fluids    Syncope and collapse  Assessment & Plan  See Assessment for "Sinus pause"    Plan:  · Lidocaine patch for back pain  · Tylenol prn    Stage 3a chronic kidney disease ([address])  Assessment & Plan  Lab Results   Component Value Date    EGFR 55 08/04/2023    EGFR 47 08/03/2023    EGFR 47 03/23/2021    CREATININE 0.93 08/04/2023    CREATININE 1.05 08/03/2023    CREATININE 1.07 03/23/2021     -Avoid nephrotoxic agents    Combined hyperlipidemia  Assessment & Plan  - Continue home atorvastatin 20 mg daily    Hypertension, essential  Assessment & Plan  Home meds include, lisinopril/HCTZ and metoprolol. Plan:  · Holding home meds at this time  · Was on lisinopril 5 mg at [facility]  · Increased to home lisinopril 10 mg today  · Became hypertensive to SBPs > 180  · Was given one-time dose of hydralazine with improvement in BP  · BP has been stable since  · Continue BP monitoring    Paroxysmal A-fib (HCC)  Assessment & Plan  Rate controlled. Home eliquis. Plan:  · Was initially holding home eliquis for possible pacemaker placement today, but procedure will take place Monday, per EP  · Restart home eliquis, will hold nighttime dose tomorrow and morning dose on the day of procedure  · Hold home metoprolol due to bradycardia/sinus pauses    * Sinus pause  Assessment & Plan  Pt  presented to [facility] with syncopal event. Pt reports feeling "funny" and lightheaded and fell on her knees and hit her head on the wall. Pt states that she lost consciousness for under 30 seconds and called her neighbor and they brought her to [facility] ED. CT head unremarkable, showed mild midline scalp swelling. Does complain of some lower back pain s/p fall. CT C and T spine negative. On tele was found to have sinus pauses and bradycardia in the 40s. She was transferred to [facility] for EP evaluation. TTE done today at [facility] showed EF of 65%. K at 3.2 and Mg at 1.9 on 08/05.     Plan:  · Hold home metoprolol  · Continuous Tele, pacer pads on patient  · Appeared to have large PVC burden on tele  · Maintain K>4, Mg>2, both repleted today  · EP consult  · Will have dual chamber PPM placed on Monday  · Re-advanced diet, NPO at midnight tomorrow  · Lyme disease pending        Disposition: inpatient for pacemaker placement      SUBJECTIVE     Patient seen and examined. No acute events overnight. Pt on continuous tele noted to be in Afib with sinus pauses. Rates low 100s. Pts only complaint this morning is upper back pain located right paraspinal mid thoracic level. No radiation. Worse with movement. Tylenol and ice packs helped. She denies any chest pain, palpitations, SOB, dizziness, lightheadedness, headaches, abdominal pain. OBJECTIVE     Vitals:    23 2035 23 2153 23 0232 23 0752   BP:  161/91 140/78 142/90   BP Location:       Pulse:  74 61 (!) 122   Resp:    18   Temp:  98.1 °F (36.7 °C)  97.8 °F (36.6 °C)   TempSrc:       SpO2: 95% 95% 97% 96%   Weight:       Height:          Temperature:   Temp (24hrs), Av.2 °F (36.8 °C), Min:97.8 °F (36.6 °C), Max:98.8 °F (37.1 °C)    Temperature: 97.8 °F (36.6 °C)  Intake & Output:  I/O        07 07 07 07 0701   0700    P. O. 0 655     Total Intake(mL/kg) 0 (0) 655 (8.1)     Urine (mL/kg/hr) 600 600 (0.3)     Total Output 600 600     Net -600 +55                Weights:   IBW (Ideal Body Weight): 52.4 kg    Body mass index is 31.52 kg/m². Weight (last 2 days)     Date/Time Weight    23 0500 80.7 (177.91)    23 2345 81.3 (179.23)        Physical Exam  Constitutional:       Appearance: Normal appearance. HENT:      Head: Normocephalic and atraumatic. Eyes:      Conjunctiva/sclera: Conjunctivae normal.   Cardiovascular:      Rate and Rhythm: Tachycardia present. Rhythm irregular. Heart sounds: No murmur heard. No friction rub. Pulmonary:      Effort: Pulmonary effort is normal.      Breath sounds: Normal breath sounds. Abdominal:      General: Abdomen is flat. Palpations: Abdomen is soft. Musculoskeletal:      Right lower leg: No edema.       Left lower leg: No edema. Skin:     General: Skin is warm and dry. Capillary Refill: Capillary refill takes less than 2 seconds. Neurological:      Mental Status: She is alert and oriented to person, place, and time. Psychiatric:         Mood and Affect: Mood normal.         Behavior: Behavior normal.       LABORATORY DATA     Labs: I have personally reviewed pertinent reports. Results from last 7 days   Lab Units 08/06/23  0438 08/05/23  0503 08/04/23  0454   WBC Thousand/uL 7.84 9.63 9.71   HEMOGLOBIN g/dL 14.8 13.7 13.1   HEMATOCRIT % 45.1 41.4 39.0   PLATELETS Thousands/uL 201 192 198   NEUTROS PCT % 71 77* 70   MONOS PCT % 12 9 10   EOS PCT % 2 1 2      Results from last 7 days   Lab Units 08/06/23  0438 08/05/23  0503 08/04/23  0454   POTASSIUM mmol/L 4.2 3.2* 3.6   CHLORIDE mmol/L 100 99 97   CO2 mmol/L 28 25 25   BUN mg/dL 10 10 17   CREATININE mg/dL 0.94 0.86 0.93   CALCIUM mg/dL 9.1 8.5 9.0   ALK PHOS U/L 54 50 41   ALT U/L 17 19 12   AST U/L 18 21 18     Results from last 7 days   Lab Units 08/06/23  0438 08/05/23  0503 08/04/23  2300   MAGNESIUM mg/dL 2.2 1.9 2.0          Results from last 7 days   Lab Units 08/05/23  0503 08/04/23  2300 08/04/23  1649 08/04/23  1035   INR   --  1.19  --  1.21*   PTT seconds 74* 77* 73* 35               IMAGING & DIAGNOSTIC TESTING     Radiology Results: I have personally reviewed pertinent reports. No results found. Other Diagnostic Testing: I have personally reviewed pertinent reports.     ACTIVE MEDICATIONS     Current Facility-Administered Medications   Medication Dose Route Frequency   • acetaminophen (TYLENOL) tablet 650 mg  650 mg Oral Q6H PRN   • apixaban (ELIQUIS) tablet 5 mg  5 mg Oral BID   • aspirin (ECOTRIN LOW STRENGTH) EC tablet 81 mg  81 mg Oral Daily   • atorvastatin (LIPITOR) tablet 20 mg  20 mg Oral Daily   • doxycycline hyclate (VIBRAMYCIN) capsule 100 mg  100 mg Oral Q12H [address]   • fluticasone (FLONASE) 50 mcg/act nasal spray 1 spray  1 spray Each Nare BID   • gabapentin (NEURONTIN) capsule 300 mg  300 mg Oral HS   • lidocaine (LIDODERM) 5 % patch 1 patch  1 patch Topical Daily   • lisinopril (ZESTRIL) tablet 10 mg  10 mg Oral Daily   • melatonin tablet 6 mg  6 mg Oral HS   • polyethylene glycol (MIRALAX) packet 17 g  17 g Oral Daily   • senna-docusate sodium (SENOKOT S) 8.6-50 mg per tablet 1 tablet  1 tablet Oral BID       VTE Pharmacologic Prophylaxis: Reason for no pharmacologic prophylaxis on eliquis. d/c 8/6 for pacemaker placement 8/7  VTE Mechanical Prophylaxis: sequential compression device    Portions of the record may have been created with voice recognition software. Occasional wrong word or "sound a like" substitutions may have occurred due to the inherent limitations of voice recognition software.   Read the chart carefully and recognize, using context, where substitutions have occurred.  ==  Chad Landeros MD  [address] [facility]  Internal Medicine Residency PGY-1

## 2023-08-06 NOTE — QUICK NOTE
Plan was reviewed with patient today by Dr. West Dietz. She will undergo permanent pacemaker placement tomorrow for sinus pauses with associated syncope. She does have history of paroxysmal atrial fibrillation and is currently in rapid rates, however given sinus pauses with associated syncope we are hesitant to add rate control medicines at this time. She is currently asymptomatic so plan will be for pacemaker insertion tomorrow and patient can then be uptitrated on rate control medicines as blood pressure allows. For now given asymptomatic tachycardia, will allow patient to be tachycardic until bridged to pacemaker tomorrow. She will be made n.p.o. at midnight for procedure.

## 2023-08-07 ENCOUNTER — ANESTHESIA (INPATIENT)
Dept: NON INVASIVE DIAGNOSTICS | Facility: HOSPITAL | Age: 88
DRG: 243 | End: 2023-08-07
Payer: MEDICARE

## 2023-08-07 ENCOUNTER — APPOINTMENT (OUTPATIENT)
Dept: RADIOLOGY | Facility: HOSPITAL | Age: 88
DRG: 243 | End: 2023-08-07
Payer: MEDICARE

## 2023-08-07 LAB
ALBUMIN SERPL BCP-MCNC: 3.1 G/DL (ref 3.5–5)
ALP SERPL-CCNC: 56 U/L (ref 46–116)
ALT SERPL W P-5'-P-CCNC: 18 U/L (ref 12–78)
ANION GAP SERPL CALCULATED.3IONS-SCNC: 5 MMOL/L
AST SERPL W P-5'-P-CCNC: 23 U/L (ref 5–45)
ATRIAL RATE: 288 BPM
ATRIAL RATE: 300 BPM
ATRIAL RATE: 77 BPM
BASOPHILS # BLD AUTO: 0.05 THOUSANDS/ÂΜL (ref 0–0.1)
BASOPHILS NFR BLD AUTO: 1 % (ref 0–1)
BILIRUB SERPL-MCNC: 0.75 MG/DL (ref 0.2–1)
BUN SERPL-MCNC: 16 MG/DL (ref 5–25)
CALCIUM ALBUM COR SERPL-MCNC: 9.6 MG/DL (ref 8.3–10.1)
CALCIUM SERPL-MCNC: 8.9 MG/DL (ref 8.3–10.1)
CHLORIDE SERPL-SCNC: 102 MMOL/L (ref 96–108)
CO2 SERPL-SCNC: 25 MMOL/L (ref 21–32)
CREAT SERPL-MCNC: 1.04 MG/DL (ref 0.6–1.3)
EOSINOPHIL # BLD AUTO: 0.26 THOUSAND/ÂΜL (ref 0–0.61)
EOSINOPHIL NFR BLD AUTO: 3 % (ref 0–6)
ERYTHROCYTE [DISTWIDTH] IN BLOOD BY AUTOMATED COUNT: 13.4 % (ref 11.6–15.1)
GFR SERPL CREATININE-BSD FRML MDRD: 48 ML/MIN/1.73SQ M
GLUCOSE SERPL-MCNC: 89 MG/DL (ref 65–140)
HCT VFR BLD AUTO: 46.1 % (ref 34.8–46.1)
HGB BLD-MCNC: 15.3 G/DL (ref 11.5–15.4)
IMM GRANULOCYTES # BLD AUTO: 0.03 THOUSAND/UL (ref 0–0.2)
IMM GRANULOCYTES NFR BLD AUTO: 0 % (ref 0–2)
INR PPP: 1.12 (ref 0.84–1.19)
LYMPHOCYTES # BLD AUTO: 1.55 THOUSANDS/ÂΜL (ref 0.6–4.47)
LYMPHOCYTES NFR BLD AUTO: 18 % (ref 14–44)
MAGNESIUM SERPL-MCNC: 2.2 MG/DL (ref 1.6–2.6)
MCH RBC QN AUTO: 30.9 PG (ref 26.8–34.3)
MCHC RBC AUTO-ENTMCNC: 33.2 G/DL (ref 31.4–37.4)
MCV RBC AUTO: 93 FL (ref 82–98)
MONOCYTES # BLD AUTO: 1.07 THOUSAND/ÂΜL (ref 0.17–1.22)
MONOCYTES NFR BLD AUTO: 13 % (ref 4–12)
NEUTROPHILS # BLD AUTO: 5.5 THOUSANDS/ÂΜL (ref 1.85–7.62)
NEUTS SEG NFR BLD AUTO: 65 % (ref 43–75)
NRBC BLD AUTO-RTO: 0 /100 WBCS
P AXIS: 218 DEGREES
P AXIS: 97 DEGREES
PLATELET # BLD AUTO: 208 THOUSANDS/UL (ref 149–390)
PMV BLD AUTO: 10.3 FL (ref 8.9–12.7)
POTASSIUM SERPL-SCNC: 4.2 MMOL/L (ref 3.5–5.3)
PR INTERVAL: 104 MS
PROT SERPL-MCNC: 6.6 G/DL (ref 6.4–8.4)
PROTHROMBIN TIME: 14.6 SECONDS (ref 11.6–14.5)
QRS AXIS: -17 DEGREES
QRS AXIS: -52 DEGREES
QRS AXIS: -54 DEGREES
QRSD INTERVAL: 102 MS
QRSD INTERVAL: 94 MS
QRSD INTERVAL: 98 MS
QT INTERVAL: 356 MS
QT INTERVAL: 380 MS
QT INTERVAL: 406 MS
QTC INTERVAL: 438 MS
QTC INTERVAL: 459 MS
QTC INTERVAL: 479 MS
RBC # BLD AUTO: 4.95 MILLION/UL (ref 3.81–5.12)
SODIUM SERPL-SCNC: 132 MMOL/L (ref 135–147)
T WAVE AXIS: 65 DEGREES
T WAVE AXIS: 76 DEGREES
T WAVE AXIS: 79 DEGREES
VENTRICULAR RATE: 109 BPM
VENTRICULAR RATE: 77 BPM
VENTRICULAR RATE: 80 BPM
WBC # BLD AUTO: 8.46 THOUSAND/UL (ref 4.31–10.16)

## 2023-08-07 PROCEDURE — 80053 COMPREHEN METABOLIC PANEL: CPT

## 2023-08-07 PROCEDURE — C1769 GUIDE WIRE: HCPCS | Performed by: INTERNAL MEDICINE

## 2023-08-07 PROCEDURE — C1892 INTRO/SHEATH,FIXED,PEEL-AWAY: HCPCS | Performed by: INTERNAL MEDICINE

## 2023-08-07 PROCEDURE — C1898 LEAD, PMKR, OTHER THAN TRANS: HCPCS | Performed by: INTERNAL MEDICINE

## 2023-08-07 PROCEDURE — 0JH606Z INSERTION OF PACEMAKER, DUAL CHAMBER INTO CHEST SUBCUTANEOUS TISSUE AND FASCIA, OPEN APPROACH: ICD-10-PCS | Performed by: INTERNAL MEDICINE

## 2023-08-07 PROCEDURE — 99232 SBSQ HOSP IP/OBS MODERATE 35: CPT | Performed by: INTERNAL MEDICINE

## 2023-08-07 PROCEDURE — 33208 INSRT HEART PM ATRIAL & VENT: CPT | Performed by: INTERNAL MEDICINE

## 2023-08-07 PROCEDURE — 3E0102A INTRODUCTION OF ANTI-INFECTIVE ENVELOPE INTO SUBCUTANEOUS TISSUE, OPEN APPROACH: ICD-10-PCS | Performed by: INTERNAL MEDICINE

## 2023-08-07 PROCEDURE — 93010 ELECTROCARDIOGRAM REPORT: CPT | Performed by: INTERNAL MEDICINE

## 2023-08-07 PROCEDURE — 71045 X-RAY EXAM CHEST 1 VIEW: CPT

## 2023-08-07 PROCEDURE — 02H63JZ INSERTION OF PACEMAKER LEAD INTO RIGHT ATRIUM, PERCUTANEOUS APPROACH: ICD-10-PCS | Performed by: INTERNAL MEDICINE

## 2023-08-07 PROCEDURE — 83735 ASSAY OF MAGNESIUM: CPT

## 2023-08-07 PROCEDURE — 85610 PROTHROMBIN TIME: CPT | Performed by: PHYSICIAN ASSISTANT

## 2023-08-07 PROCEDURE — C1785 PMKR, DUAL, RATE-RESP: HCPCS | Performed by: INTERNAL MEDICINE

## 2023-08-07 PROCEDURE — 02HK3JZ INSERTION OF PACEMAKER LEAD INTO RIGHT VENTRICLE, PERCUTANEOUS APPROACH: ICD-10-PCS | Performed by: INTERNAL MEDICINE

## 2023-08-07 PROCEDURE — 85025 COMPLETE CBC W/AUTO DIFF WBC: CPT

## 2023-08-07 PROCEDURE — 93005 ELECTROCARDIOGRAM TRACING: CPT

## 2023-08-07 DEVICE — LEAD 457453 MRI US BI RCMCRD MVC
Type: IMPLANTABLE DEVICE | Site: HEART | Status: FUNCTIONAL
Brand: CAPSURE SENSE MRI™ SURESCAN™

## 2023-08-07 DEVICE — IPG W1DR01 AZURE XT DR MRI USA
Type: IMPLANTABLE DEVICE | Site: CHEST  WALL | Status: FUNCTIONAL
Brand: AZURE™ XT DR MRI SURESCAN™

## 2023-08-07 DEVICE — LEAD 3830 US MKT/ 69CM MRI LBBAP
Type: IMPLANTABLE DEVICE | Site: HEART | Status: FUNCTIONAL
Brand: SELECTSECURE™ MRI SURESCAN™

## 2023-08-07 RX ORDER — LIDOCAINE HYDROCHLORIDE 10 MG/ML
INJECTION, SOLUTION EPIDURAL; INFILTRATION; INTRACAUDAL; PERINEURAL CODE/TRAUMA/SEDATION MEDICATION
Status: DISCONTINUED | OUTPATIENT
Start: 2023-08-07 | End: 2023-08-07 | Stop reason: HOSPADM

## 2023-08-07 RX ORDER — PROPOFOL 10 MG/ML
INJECTION, EMULSION INTRAVENOUS CONTINUOUS PRN
Status: DISCONTINUED | OUTPATIENT
Start: 2023-08-07 | End: 2023-08-07

## 2023-08-07 RX ORDER — METOPROLOL SUCCINATE 25 MG/1
25 TABLET, EXTENDED RELEASE ORAL 2 TIMES DAILY
Status: DISCONTINUED | OUTPATIENT
Start: 2023-08-07 | End: 2023-08-10 | Stop reason: HOSPADM

## 2023-08-07 RX ORDER — SODIUM CHLORIDE 9 MG/ML
INJECTION, SOLUTION INTRAVENOUS CONTINUOUS PRN
Status: DISCONTINUED | OUTPATIENT
Start: 2023-08-07 | End: 2023-08-07

## 2023-08-07 RX ORDER — PROPOFOL 10 MG/ML
INJECTION, EMULSION INTRAVENOUS AS NEEDED
Status: DISCONTINUED | OUTPATIENT
Start: 2023-08-07 | End: 2023-08-07

## 2023-08-07 RX ORDER — CEFAZOLIN SODIUM 2 G/50ML
2000 SOLUTION INTRAVENOUS ONCE
Status: COMPLETED | OUTPATIENT
Start: 2023-08-07 | End: 2023-08-07

## 2023-08-07 RX ORDER — FENTANYL CITRATE 50 UG/ML
INJECTION, SOLUTION INTRAMUSCULAR; INTRAVENOUS AS NEEDED
Status: DISCONTINUED | OUTPATIENT
Start: 2023-08-07 | End: 2023-08-07

## 2023-08-07 RX ORDER — PHENYLEPHRINE HCL IN 0.9% NACL 1 MG/10 ML
SYRINGE (ML) INTRAVENOUS AS NEEDED
Status: DISCONTINUED | OUTPATIENT
Start: 2023-08-07 | End: 2023-08-07

## 2023-08-07 RX ORDER — METOPROLOL SUCCINATE 25 MG/1
25 TABLET, EXTENDED RELEASE ORAL 2 TIMES DAILY
Status: DISCONTINUED | OUTPATIENT
Start: 2023-08-07 | End: 2023-08-07

## 2023-08-07 RX ORDER — DIGOXIN 0.25 MG/ML
INJECTION INTRAMUSCULAR; INTRAVENOUS AS NEEDED
Status: DISCONTINUED | OUTPATIENT
Start: 2023-08-07 | End: 2023-08-07

## 2023-08-07 RX ORDER — GENTAMICIN SULFATE 40 MG/ML
INJECTION, SOLUTION INTRAMUSCULAR; INTRAVENOUS CODE/TRAUMA/SEDATION MEDICATION
Status: DISCONTINUED | OUTPATIENT
Start: 2023-08-07 | End: 2023-08-07 | Stop reason: HOSPADM

## 2023-08-07 RX ADMIN — FLUTICASONE PROPIONATE 1 SPRAY: 50 SPRAY, METERED NASAL at 09:07

## 2023-08-07 RX ADMIN — PROPOFOL 80 MCG/KG/MIN: 10 INJECTION, EMULSION INTRAVENOUS at 14:07

## 2023-08-07 RX ADMIN — LISINOPRIL 10 MG: 10 TABLET ORAL at 08:59

## 2023-08-07 RX ADMIN — DIGOXIN 250 MCG: 0.25 INJECTION INTRAMUSCULAR; INTRAVENOUS at 14:31

## 2023-08-07 RX ADMIN — APIXABAN 5 MG: 5 TABLET, FILM COATED ORAL at 17:43

## 2023-08-07 RX ADMIN — Medication 100 MCG: at 14:27

## 2023-08-07 RX ADMIN — GABAPENTIN 300 MG: 300 CAPSULE ORAL at 21:04

## 2023-08-07 RX ADMIN — SENNOSIDES AND DOCUSATE SODIUM 1 TABLET: 50; 8.6 TABLET ORAL at 17:43

## 2023-08-07 RX ADMIN — PROPOFOL 30 MG: 10 INJECTION, EMULSION INTRAVENOUS at 14:08

## 2023-08-07 RX ADMIN — MELATONIN 6 MG: at 21:04

## 2023-08-07 RX ADMIN — SODIUM CHLORIDE: 9 INJECTION, SOLUTION INTRAVENOUS at 13:43

## 2023-08-07 RX ADMIN — SENNOSIDES AND DOCUSATE SODIUM 1 TABLET: 50; 8.6 TABLET ORAL at 08:59

## 2023-08-07 RX ADMIN — CEFAZOLIN SODIUM 2000 MG: 2 SOLUTION INTRAVENOUS at 13:29

## 2023-08-07 RX ADMIN — PROPOFOL 30 MG: 10 INJECTION, EMULSION INTRAVENOUS at 14:07

## 2023-08-07 RX ADMIN — Medication 100 MCG: at 14:18

## 2023-08-07 RX ADMIN — FENTANYL CITRATE 25 MCG: 50 INJECTION, SOLUTION INTRAMUSCULAR; INTRAVENOUS at 14:06

## 2023-08-07 RX ADMIN — ASPIRIN 81 MG: 81 TABLET, COATED ORAL at 08:59

## 2023-08-07 RX ADMIN — ATORVASTATIN CALCIUM 20 MG: 20 TABLET, FILM COATED ORAL at 08:59

## 2023-08-07 RX ADMIN — LIDOCAINE 1 PATCH: 50 PATCH CUTANEOUS at 09:00

## 2023-08-07 RX ADMIN — METOPROLOL SUCCINATE 25 MG: 25 TABLET, FILM COATED, EXTENDED RELEASE ORAL at 19:33

## 2023-08-07 RX ADMIN — FLUTICASONE PROPIONATE 1 SPRAY: 50 SPRAY, METERED NASAL at 17:43

## 2023-08-07 NOTE — PROGRESS NOTES
INTERNAL MEDICINE RESIDENCY PROGRESS NOTE     Name: Willian Fountain   Age & Sex: 80 y.o. female   MRN: [id number]  Unit/Bed#: BE CATH LAB ROOM   Encounter: [id number]  Team: SOD Team C     PATIENT INFORMATION     Name: Willian Fountain   Age & Sex: 80 y.o. female   MRN: [id number]  Hospital Stay Days: 3    ASSESSMENT/PLAN     Principal Problem:    Sinus pause  Active Problems:    Syncope and collapse    Paroxysmal A-fib ([address])    Hypertension, essential    Combined hyperlipidemia    Stage 3a chronic kidney disease (HCC)    Hyponatremia    Sinus pain    Lung nodule seen on imaging study    Multinodular goiter      * Sinus pause  Assessment & Plan  Pt  presented to [address] [facility] with syncopal event. Pt reports feeling "funny" and lightheaded and fell on her knees and hit her head on the wall. Pt states that she lost consciousness for under 30 seconds and called her neighbor and they brought her to [address] [place] [address] ED. CT head unremarkable, showed mild midline scalp swelling. Does complain of some lower back pain s/p fall. CT C and T spine negative. On tele was found to have sinus pauses and bradycardia in the 40s. She was transferred to [address] [place] [address] for EP evaluation. TTE done today at [id number] [place] [address] showed EF of 65%. K at 3.2 and Mg at 1.9 on 08/05. Plan:  · Hold home metoprolol  · Continuous Tele, pacer pads on patient  · Appeared to have large PVC burden on tele  · Maintain K>4, Mg>2, both repleted today  · EP consult  · Will have dual chamber PPM placed on Monday  · Re-advanced diet, NPO at midnight tomorrow  · Lyme disease pending    Syncope and collapse  Assessment & Plan  See Assessment for "Sinus pause"    Plan:  · Lidocaine patch for back pain  · Tylenol prn  · Ice packs for back pain at pt request     Paroxysmal A-fib ([address])  Assessment & Plan  Rate controlled. Home eliquis.     Plan:  · Was initially holding home eliquis for possible pacemaker placement today, but procedure will take place Monday, per EP  · Restart home eliquis, will hold nighttime dose tomorrow and morning dose on the day of procedure (held on night of 8/6 and AM 8/7)  · Hold home metoprolol due to bradycardia/sinus pauses    Hypertension, essential  Assessment & Plan  Home meds include, lisinopril/HCTZ and metoprolol. Plan:  · Was on lisinopril 5 mg at [facility]  · Increased to home lisinopril 10 mg   · Became hypertensive to SBPs > 180  · Was given one-time dose of hydralazine with improvement in BP  · BP has been stable since  · Continue BP monitoring    Combined hyperlipidemia  Assessment & Plan  - Continue home atorvastatin 20 mg daily    Stage 3a chronic kidney disease [facility])  Assessment & Plan  Lab Results   Component Value Date    EGFR 55 08/04/2023    EGFR 47 08/03/2023    EGFR 47 03/23/2021    CREATININE 0.93 08/04/2023    CREATININE 1.05 08/03/2023    CREATININE 1.07 03/23/2021     -Avoid nephrotoxic agents    Hyponatremia  Assessment & Plan  Sodium on admission at [facility] was 128 and improved to 129, now at 132 and stable on 8/7 also at 132, likely 2/2 dehydration. Plan:  · CMP, monitor sodium  · Maintenance fluids    Lung nodule seen on imaging study  Assessment & Plan  CT Chest 8/3/2023 showed. "No acute posttraumatic abnormality.     1.1 cm left lower lobe nodule. No prior studies. Based on current Fleischner Society 2017 Guidelines on incidental pulmonary nodule, either PET/CT scan evaluation, tissue sampling or short term interval followup non-contrast CT followup (initially in 3   months) may be considered appropriate.     Multinodular goiter. Thyroid ultrasound recommended. - Found incidentally on imaging. Follow-up in outpatient setting upon DC    Sinus pain  Assessment & Plan  Pt saw her pcp on 8/1 for sinus congestion/pain. Her PCP started her on amoxicillin which she took for 3 days. At [facility] she was transitioned to doxycycline (3 days).    - Dc'ed doxycycline on 8/7- no further indication for treatment. Multinodular goiter  Assessment & Plan  CT Chest 8/3/2023 showed multinodular goiter of thyroid. - TSH normal from 8/3/2023  - Thyroid U/S in outpatient setting upon DC      Disposition: Inpatient for L-sided dual chamber pacemaker implantation. SUBJECTIVE     Patient seen and examined. Episodes of tachycardia overnight recorded on tele. Teresa Green feels well this AM with neighbor at bedside, only complaint is back pain (msk from fall), she'd been using lidocaine patches, but finding more relief from ice-packs so she will continue that. Denies chest pain, fever, chills, SOB, abdominal pain, vertigo, lightheadedness or headache. She's been NPO since midnight and ready for her procedure with EP at 3:30pm.     OBJECTIVE     Vitals:    23 0225 23 0715 23 0906 23 1112   BP: 136/65 149/87 140/79 146/65   BP Location:  Right arm  Right arm   Pulse: 75 56 (!) 115 88   Resp:  18  17   Temp:  97.6 °F (36.4 °C)  97.6 °F (36.4 °C)   TempSrc:  Oral  Oral   SpO2: 98% 95% 95% 97%   Weight:       Height:          Temperature:   Temp (24hrs), Av.9 °F (36.6 °C), Min:97.6 °F (36.4 °C), Max:98.2 °F (36.8 °C)    Temperature: 97.6 °F (36.4 °C)  Intake & Output:  I/O        07 07 07 07 0700    P. O. 655 478 0    Total Intake(mL/kg) 655 (8.1) 478 (5.9) 0 (0)    Urine (mL/kg/hr) 600 (0.3) 1100 (0.6)     Total Output 600 1100     Net +55 -622 0               Weights:   IBW (Ideal Body Weight): 52.4 kg    Body mass index is 31.52 kg/m². Weight (last 2 days)     Date/Time Weight    23 0500 80.7 (177.91)        Physical Exam  Constitutional:       General: She is not in acute distress. Appearance: She is obese. She is not ill-appearing or diaphoretic. HENT:      Head: Normocephalic. Comments: Well headling abrasion from fall along midline of scalp (sagital).    Eyes:      Pupils: Pupils are equal, round, and reactive to light. Cardiovascular:      Rate and Rhythm: Tachycardia present. Rhythm irregular. Pulmonary:      Effort: Pulmonary effort is normal.      Breath sounds: Normal breath sounds. Abdominal:      General: Bowel sounds are normal.      Palpations: Abdomen is soft. Tenderness: There is no abdominal tenderness. Musculoskeletal:         General: Tenderness present. Cervical back: Neck supple. Right lower leg: No edema. Left lower leg: No edema. Comments: back   Skin:     Findings: Bruising present. Comments: Healing bruising all over arms and healing abrasion on R elbow (bandaged). Neurological:      Mental Status: She is alert and oriented to person, place, and time. Psychiatric:         Mood and Affect: Mood normal.       LABORATORY DATA     Labs: I have personally reviewed pertinent reports. Results from last 7 days   Lab Units 08/07/23 0441 08/06/23 0438 08/05/23  0503   WBC Thousand/uL 8.46 7.84 9.63   HEMOGLOBIN g/dL 15.3 14.8 13.7   HEMATOCRIT % 46.1 45.1 41.4   PLATELETS Thousands/uL 208 201 192   NEUTROS PCT % 65 71 77*   MONOS PCT % 13* 12 9   EOS PCT % 3 2 1      Results from last 7 days   Lab Units 08/07/23  0441 08/06/23  0438 08/05/23  0503   POTASSIUM mmol/L 4.2 4.2 3.2*   CHLORIDE mmol/L 102 100 99   CO2 mmol/L 25 28 25   BUN mg/dL 16 10 10   CREATININE mg/dL 1.04 0.94 0.86   CALCIUM mg/dL 8.9 9.1 8.5   ALK PHOS U/L 56 54 50   ALT U/L 18 17 19   AST U/L 23 18 21     Results from last 7 days   Lab Units 08/07/23 0441 08/06/23  0438 08/05/23  0503   MAGNESIUM mg/dL 2.2 2.2 1.9          Results from last 7 days   Lab Units 08/07/23  0441 08/05/23  0503 08/04/23  2300 08/04/23  1649 08/04/23  1035   INR  1.12  --  1.19  --  1.21*   PTT seconds  --  74* 77* 73* 35               IMAGING & DIAGNOSTIC TESTING     Radiology Results: I have personally reviewed pertinent reports. No results found.   Other Diagnostic Testing: I have personally reviewed pertinent reports. ACTIVE MEDICATIONS     Current Facility-Administered Medications   Medication Dose Route Frequency   • acetaminophen (TYLENOL) tablet 650 mg  650 mg Oral Q6H PRN   • aspirin (ECOTRIN LOW STRENGTH) EC tablet 81 mg  81 mg Oral Daily   • atorvastatin (LIPITOR) tablet 20 mg  20 mg Oral Daily   • fluticasone (FLONASE) 50 mcg/act nasal spray 1 spray  1 spray Each Nare BID   • gabapentin (NEURONTIN) capsule 300 mg  300 mg Oral HS   • lidocaine (LIDODERM) 5 % patch 1 patch  1 patch Topical Daily   • lisinopril (ZESTRIL) tablet 10 mg  10 mg Oral Daily   • melatonin tablet 6 mg  6 mg Oral HS   • polyethylene glycol (MIRALAX) packet 17 g  17 g Oral Daily   • senna-docusate sodium (SENOKOT S) 8.6-50 mg per tablet 1 tablet  1 tablet Oral BID       VTE Pharmacologic Prophylaxis: RX contraindicated due to: upcoming EP procedure  VTE Mechanical Prophylaxis: sequential compression device    Portions of the record may have been created with voice recognition software. Occasional wrong word or "sound a like" substitutions may have occurred due to the inherent limitations of voice recognition software.   Read the chart carefully and recognize, using context, where substitutions have occurred.  ==  Angy Pringle MD  [facility]  Internal Medicine Residency PGY-1

## 2023-08-07 NOTE — ANESTHESIA PREPROCEDURE EVALUATION
Procedure:  Cardiac pacer implant (Chest)      Clinical conditions   Sick sinu syndrome   Syncope and pre syncope   Paroxysmal atrial fibrillation  Hypertension  Hyperlipidemia  PAD s/p right SFA and popliteal PCI  history of CVA    Relevant Problems   CARDIO   (+) Combined hyperlipidemia   (+) Hypertension, essential   (+) Paroxysmal A-fib (HCC)   (+) Sinus pause      /RENAL   (+) Stage 3a chronic kidney disease ([address])   ECHO Interpretation Summary 8/4/23       •  Left Ventricle: Left ventricular cavity size is normal. Wall thickness is mildly increased. There is mild concentric hypertrophy. Systolic function is normal (65%). Wall motion is normal. Diastolic function is normal.  •  Right Ventricle: Right ventricular cavity size is normal. Systolic function is normal.  •  Left Atrium: The atrium is mildly dilated. •  Right Atrium: The atrium is mildly dilated. •  Mitral Valve: There is mild annular calcification. There is mild regurgitation. •  Aorta: The aortic root is normal in size. The ascending aorta is mildly dilated (4 cm). Physical Exam    Airway    Mallampati score: II  TM Distance: >3 FB  Neck ROM: full     Dental       Cardiovascular  Rhythm: irregular, Rate: abnormal,     Pulmonary  Pulmonary exam normal     Other Findings     80year-old lady admitted with presyncope observed to have sinus pauses on telemetry at [address] [place] [address]. She has a past medical history of hypertension , HLD, PAD s/p right SFA and popliteal PCI , hx of CVA and paroxysmal atrial fibrillation. She was found to be dehydrated, mildly hypotensive, hyponatremic and mild SEAN. At the present time the lisinopril/hydrochlorothiazide is on hold. Patient feels well and denies any symptoms. She is scheduled for dual-chamber pacer for sick sinus syndrome. Anesthesia Plan  ASA Score- 3     Anesthesia Type- IV sedation with anesthesia with ASA Monitors.          Additional Monitors:   Airway Plan:           Plan Factors-Exercise tolerance (METS): <4 METS. Chart reviewed. EKG reviewed. Existing labs reviewed. Patient summary reviewed. Patient is not a current smoker. Induction- intravenous. Postoperative Plan-     Informed Consent- Anesthetic plan and risks discussed with patient. I personally reviewed this patient with the CRNA. Discussed and agreed on the Anesthesia Plan with the CRNA. Chetan Sheppard

## 2023-08-07 NOTE — ANESTHESIA POSTPROCEDURE EVALUATION
Post-Op Assessment Note    CV Status:  Stable  Pain Score: 0    Pain management: adequate     Mental Status:  Alert and awake   Hydration Status:  Euvolemic   PONV Controlled:  Controlled   Airway Patency:  Patent   Two or more mitigation strategies used for obstructive sleep apnea   Post Op Vitals Reviewed: Yes      Staff: CRNA         No notable events documented.     BP   119/62   Temp   97   Pulse  102   Resp   10   SpO2   100

## 2023-08-07 NOTE — WOUND OSTOMY CARE
Consult Note - Wound   Dulce Space 80 y.o. female MRN: [id number]  Unit/Bed#: -01 Encounter: [id number]    Attempted to see patient for wound care assessment/consultation. Off the unit in cath lab. Wound care team will follow--to assess later this week.     Olvin BARRAZA, RN, [employer]

## 2023-08-07 NOTE — DISCHARGE INSTR - AVS FIRST PAGE
Discharge Instructions    Please follow up with your primary care doctor within 1 week(s) of discharge, please call [phone] if you have any questions or issues making an appointment. Please follow up with the cardiologist (heart doctor) within 2 week(s) of being discharged, please call [phone] if you have any questions or issues making an appointment. We have made some changes to your medications, please take these medications as described in your paperwork and has also been included below:  Please continue taking all medications as prescribed, especially your ASPIRIN, ELIQUIS, and TOPROL. Please start taking TYLENOL 325 mg TWICE A DAY for your back pain. If you continue to have any further symptoms of light-headedness, passing out, or palpitations or any other worsening symptoms, please come into the emergency department at once. Please refer to the instructions below for PACEMAKER CARE:    Please refer to post pacemaker implantation discharge instructions and restrictions and your pacemaker booklet/temporary card. Keep incision dry for one week. Do not use lotions/powders/creams on incision. Leave outer bandage in place for 1 week - it is water proof, and as long as it is fully adhered to your skin you may shower with it. If it appears as though the bandage is coming off and/or there is any communication to the area of device incision, please then keep the whole area dry for the remaining week. After 1 week, please remove by pulling all edges away from the center of the bandage. No overhead reaching/pushing/pulling/lifting greater than 5-10lbs with left arm for six weeks. Please call the office if you notice redness, swelling, bleeding, or drainage from incision or if you develop fevers. AFTER PACEMAKER CARE:    If you have any questions, please call [phone] to speak with a nurse (8:30am-4pm, or [phone] after hours). For appointments, please call [phone].       WHAT YOU SHOULD KNOW:   A pacemaker is a small, battery-powered device that is placed under your skin in your upper chest area with wires placed through a vein that lead directly into the heart. It helps regulate your heart rate and prevent your heart from beating too slowly. AFTER YOU LEAVE:     Medicines:     Pain medicine: You may need medicine to take away or decrease pain. Learn how to take your medicine. Ask what medicine and how much you should take. Be sure you know how, when, and how often to take it. Usually Over the counter pain medicine is sufficient to control pain (Acetominophen or Ibuprofen) Ask your doctor if you may take these. If this does not control your pain, narcotic pain killers may be prescribed, please call if you need prescription. Do not wait until the pain is severe before you take your medicine. Tell caregivers if your pain does not decrease. Pain medicine can make you dizzy or sleepy. Prevent falls by calling someone when you get out of bed or if you need help. Take your medicine as directed. Call your healthcare provider if you think your medicine is not helping or if you have side effects. Tell him if you are allergic to any medicine. Follow up with your cardiologist after your procedure: You will need a follow-up visit approximately 2 weeks after you leave the hospital. Your cardiologist will check your wound and make sure that your pacemaker is working correctly. Follow the instructions to check your pacemaker: Your cardiologist or primary healthcare provider will check your pacemaker and the battery regularly. He will use a computer to check your pacemaker over the telephone or wireless device which will be given to you. Pacemaker batteries usually last 8 to 10 years. The pacemaker unit will be replaced when the battery gets low. This is a simpler procedure than the original one to implant your pacemaker.     Wound care:  Keep your incision dry for one week. Do not use lotions/powders/creams on incision. Leave outer bandage in place for 1 week - it is water proof, and as long as it is fully adhered to your skin you may shower with it. If it appears as though the bandage is coming off and/or there is any communication to the area of device incision, please then keep the whole area dry for the remaining week. After 1 week, please remove by pulling all edges away from the center of the bandage. Please call the office if you notice redness, swelling, bleeding, or drainage from incision or if you develop fevers. Activity:   Arm movement and lifting:  Be careful using the arm on the side of your pacemaker. Do not move your arm for the first 24 hours after your procedure. Do not  lift your arm above your shoulder or lift more than 10 pounds for one month after your procedure. Avoid pushing, pulling, or repetitive arm movements for one month. This helps the leads stay in place and helps your wound heal. Ask your caregiver when you can drive after your procedure. You may move your arm side to side without lifting above your shoulder, and do not need to wear a sling at home. Driving: you are ok to drive 48 hours after pacemaker is implanted   Sports:  Ask your caregiver when it is okay to play tennis, golf, basketball, or any sport that requires you to lift your arms. Do not play full contact sports, such as football, that could damage your pacemaker. Ask your cardiologist or primary healthcare provider how much and what kinds of physical activity are safe for you. Living with a pacemaker:   Tell all caregivers you have a pacemaker: This includes surgeons, radiologists, and medical technicians. You may want to wear a medical alert ID bracelet or necklace that states that you have a pacemaker. Carry your pacemaker ID card: Make sure you receive a pacemaker ID card. Carry it with you at all times.  It lists important information about your pacemaker. Show it to airport security if you travel. Avoid electrical interference:  Avoid welding equipment and other equipment with large magnets or electric fields. These things could interfere with how your pacemaker works. Use your cell phone on the ear opposite from your pacemaker. Do not carry your cell phone in your shirt pocket over your chest.     Some Pacemakers are MRI safe. Ask you doctor if it is safe to proceed with MRI and let the radiologist and staff know you have a pacemaker. Do not touch the skin around your pacemaker: This can cause damage to the lead wires or move the pacemaker unit from where it should be. Contact your cardiologist or primary healthcare provider if:   The area around your pacemaker has increasing amount of pain after surgery. The pain should improve over first few days after implantation. The skin around your stitches has increasing redness, swelling, or has drainage. This may mean that you have an infection. You have a fever. You have chills, a cough, and feel weak or achy. These are also signs of infection. Your feet or ankles are more swollen than your baseline. Your Heart rate is less than 50 beats per minute     Seek care immediately if:   Your bandage becomes soaked with blood. Your pacemaker is swelling rapidly    Your stitches open up. You feel your heart suddenly beating very slowly or quickly. You become too weak or dizzy to stand, or you pass out. Your arm or leg feels warm, tender, and painful. It may look swollen and red. You have chest pain that does not go away with rest or medicine. You feel lightheaded, short of breath, and have chest pain. You cough up blood. © 2014 [facility] is for End User's use only and may not be sold, redistributed or otherwise used for commercial purposes.  All illustrations and images included in CareNotes® are the copyrighted property of [employer].D.A.M., Inc. or Wilfrid Grier. The above information is an  only. It is not intended as medical advice for individual conditions or treatments. Talk to your doctor, nurse or pharmacist before following any medical regimen to see if it is safe and effective for you.

## 2023-08-08 ENCOUNTER — APPOINTMENT (OUTPATIENT)
Dept: RADIOLOGY | Facility: HOSPITAL | Age: 88
DRG: 243 | End: 2023-08-08
Payer: MEDICARE

## 2023-08-08 ENCOUNTER — TELEPHONE (OUTPATIENT)
Dept: ADMINISTRATIVE | Facility: HOSPITAL | Age: 88
End: 2023-08-08

## 2023-08-08 PROBLEM — Z95.0 PACEMAKER: Status: ACTIVE | Noted: 2023-08-08

## 2023-08-08 LAB
ANION GAP SERPL CALCULATED.3IONS-SCNC: 9 MMOL/L
BASOPHILS # BLD AUTO: 0.04 THOUSANDS/ÂΜL (ref 0–0.1)
BASOPHILS NFR BLD AUTO: 0 % (ref 0–1)
BUN SERPL-MCNC: 17 MG/DL (ref 5–25)
CALCIUM SERPL-MCNC: 8.7 MG/DL (ref 8.3–10.1)
CHLORIDE SERPL-SCNC: 101 MMOL/L (ref 96–108)
CO2 SERPL-SCNC: 24 MMOL/L (ref 21–32)
CREAT SERPL-MCNC: 1.02 MG/DL (ref 0.6–1.3)
EOSINOPHIL # BLD AUTO: 0.16 THOUSAND/ÂΜL (ref 0–0.61)
EOSINOPHIL NFR BLD AUTO: 2 % (ref 0–6)
ERYTHROCYTE [DISTWIDTH] IN BLOOD BY AUTOMATED COUNT: 13.6 % (ref 11.6–15.1)
GFR SERPL CREATININE-BSD FRML MDRD: 49 ML/MIN/1.73SQ M
GLUCOSE SERPL-MCNC: 92 MG/DL (ref 65–140)
HCT VFR BLD AUTO: 46.2 % (ref 34.8–46.1)
HGB BLD-MCNC: 15.4 G/DL (ref 11.5–15.4)
IMM GRANULOCYTES # BLD AUTO: 0.04 THOUSAND/UL (ref 0–0.2)
IMM GRANULOCYTES NFR BLD AUTO: 0 % (ref 0–2)
LYMPHOCYTES # BLD AUTO: 1.32 THOUSANDS/ÂΜL (ref 0.6–4.47)
LYMPHOCYTES NFR BLD AUTO: 14 % (ref 14–44)
MCH RBC QN AUTO: 31 PG (ref 26.8–34.3)
MCHC RBC AUTO-ENTMCNC: 33.3 G/DL (ref 31.4–37.4)
MCV RBC AUTO: 93 FL (ref 82–98)
MONOCYTES # BLD AUTO: 1.04 THOUSAND/ÂΜL (ref 0.17–1.22)
MONOCYTES NFR BLD AUTO: 11 % (ref 4–12)
NEUTROPHILS # BLD AUTO: 6.8 THOUSANDS/ÂΜL (ref 1.85–7.62)
NEUTS SEG NFR BLD AUTO: 73 % (ref 43–75)
NRBC BLD AUTO-RTO: 0 /100 WBCS
PLATELET # BLD AUTO: 216 THOUSANDS/UL (ref 149–390)
PMV BLD AUTO: 10 FL (ref 8.9–12.7)
POTASSIUM SERPL-SCNC: 4.3 MMOL/L (ref 3.5–5.3)
RBC # BLD AUTO: 4.96 MILLION/UL (ref 3.81–5.12)
SODIUM SERPL-SCNC: 134 MMOL/L (ref 135–147)
WBC # BLD AUTO: 9.4 THOUSAND/UL (ref 4.31–10.16)

## 2023-08-08 PROCEDURE — 99024 POSTOP FOLLOW-UP VISIT: CPT | Performed by: PHYSICIAN ASSISTANT

## 2023-08-08 PROCEDURE — 71046 X-RAY EXAM CHEST 2 VIEWS: CPT

## 2023-08-08 PROCEDURE — 99232 SBSQ HOSP IP/OBS MODERATE 35: CPT | Performed by: INTERNAL MEDICINE

## 2023-08-08 PROCEDURE — 80048 BASIC METABOLIC PNL TOTAL CA: CPT

## 2023-08-08 PROCEDURE — 85025 COMPLETE CBC W/AUTO DIFF WBC: CPT

## 2023-08-08 RX ADMIN — POLYETHYLENE GLYCOL 3350 17 G: 17 POWDER, FOR SOLUTION ORAL at 09:09

## 2023-08-08 RX ADMIN — METOPROLOL SUCCINATE 25 MG: 25 TABLET, FILM COATED, EXTENDED RELEASE ORAL at 18:00

## 2023-08-08 RX ADMIN — APIXABAN 5 MG: 5 TABLET, FILM COATED ORAL at 18:00

## 2023-08-08 RX ADMIN — METOPROLOL SUCCINATE 25 MG: 25 TABLET, FILM COATED, EXTENDED RELEASE ORAL at 09:11

## 2023-08-08 RX ADMIN — SENNOSIDES AND DOCUSATE SODIUM 1 TABLET: 50; 8.6 TABLET ORAL at 18:00

## 2023-08-08 RX ADMIN — ASPIRIN 81 MG: 81 TABLET, COATED ORAL at 09:10

## 2023-08-08 RX ADMIN — APIXABAN 5 MG: 5 TABLET, FILM COATED ORAL at 09:11

## 2023-08-08 RX ADMIN — FLUTICASONE PROPIONATE 1 SPRAY: 50 SPRAY, METERED NASAL at 18:00

## 2023-08-08 RX ADMIN — MELATONIN 6 MG: at 21:19

## 2023-08-08 RX ADMIN — LISINOPRIL 10 MG: 10 TABLET ORAL at 09:11

## 2023-08-08 RX ADMIN — ATORVASTATIN CALCIUM 20 MG: 20 TABLET, FILM COATED ORAL at 09:11

## 2023-08-08 RX ADMIN — GABAPENTIN 300 MG: 300 CAPSULE ORAL at 21:19

## 2023-08-08 RX ADMIN — FLUTICASONE PROPIONATE 1 SPRAY: 50 SPRAY, METERED NASAL at 09:08

## 2023-08-08 RX ADMIN — ACETAMINOPHEN 650 MG: 325 TABLET, FILM COATED ORAL at 09:15

## 2023-08-08 NOTE — PROGRESS NOTES
INTERNAL MEDICINE RESIDENCY PROGRESS NOTE     Name: Kelly De León   Age & Sex: 80 y.o. female   MRN: [id number]  Unit/Bed#: -01   Encounter: [id number]  Team: SOD Team C     PATIENT INFORMATION     Name: Kelly De León   Age & Sex: 80 y.o. female   MRN: [id number]  Hospital Stay Days: 4    ASSESSMENT/PLAN     Principal Problem:    Sinus pause  Active Problems:    Syncope and collapse    Paroxysmal A-fib ([address])    Hypertension, essential    Combined hyperlipidemia    Stage 3a chronic kidney disease (HCC)    Hyponatremia    Sinus pain    Lung nodule seen on imaging study    Multinodular goiter    s/p Medtronic dual chamber PPM with left bundle pacing lead 8/7/2023      * Sinus pause  Assessment & Plan  Pt  presented to [facility] with syncopal event. Pt reports feeling "funny" and lightheaded and fell on her knees and hit her head on the wall. Pt states that she lost consciousness for under 30 seconds and called her neighbor and they brought her to [facility] ED. CT head unremarkable, showed mild midline scalp swelling. Does complain of some lower back pain s/p fall. CT C and T spine negative. On tele was found to have sinus pauses and bradycardia in the 40s. She was transferred to [address] [place] [address] for EP evaluation. TTE done today at [facility] showed EF of 65%. K at 3.2 and Mg at 1.9 on 08/05. Plan:  · Hold home metoprolol  · Continuous Tele, pacer pads on patient  · Appeared to have large PVC burden on tele  · Maintain K>4, Mg>2, both repleted today  · EP consult  · Will have dual chamber PPM placed on Monday  · Re-advanced diet, NPO at midnight tomorrow  · Lyme disease pending    Syncope and collapse  Assessment & Plan  See Assessment for "Sinus pause"    Plan:  · Lidocaine patch for back pain  · Tylenol prn  · Ice packs for back pain at pt request     Paroxysmal A-fib ([address])  Assessment & Plan  Rate controlled. Home eliquis.     Plan:  · Was initially holding home eliquis for possible pacemaker placement today, but procedure will take place Monday, per EP  · Restart home eliquis, will hold nighttime dose tomorrow and morning dose on the day of procedure (held on night of 8/6 and AM 8/7)  · Back on eliquis today  · Hold home metoprolol due to bradycardia/sinus pauses  · Back on metoprolol today (8/8/23). Hypertension, essential  Assessment & Plan  Home meds include, lisinopril/HCTZ and metoprolol. Plan:  · Was on lisinopril 5 mg at [facility]  · Increased to home lisinopril 10 mg   · Became hypertensive to SBPs > 180  · Was given one-time dose of hydralazine with improvement in BP  · BP has been stable since  · Continue BP monitoring    Combined hyperlipidemia  Assessment & Plan  - Continue home atorvastatin 20 mg daily    Stage 3a chronic kidney disease [facility])  Assessment & Plan  Lab Results   Component Value Date    EGFR 55 08/04/2023    EGFR 47 08/03/2023    EGFR 47 03/23/2021    CREATININE 0.93 08/04/2023    CREATININE 1.05 08/03/2023    CREATININE 1.07 03/23/2021     -Avoid nephrotoxic agents    Hyponatremia  Assessment & Plan  Sodium on admission at [address] [address] was 128 and improved to 129, now at 132 and stable on 8/7 also at 132, improved to 134 8/8, likely 2/2 dehydration. Plan:  · CMP, monitor sodium  · Maintenance fluids    Lung nodule seen on imaging study  Assessment & Plan  CT Chest 8/3/2023 showed. "No acute posttraumatic abnormality.     1.1 cm left lower lobe nodule. No prior studies. Based on current Fleischner Society 2017 Guidelines on incidental pulmonary nodule, either PET/CT scan evaluation, tissue sampling or short term interval followup non-contrast CT followup (initially in 3   months) may be considered appropriate.     Multinodular goiter. Thyroid ultrasound recommended. - Found incidentally on imaging. Follow-up in outpatient setting upon DC    Sinus pain  Assessment & Plan  Pt saw her pcp on 8/1 for sinus congestion/pain.  Her PCP started her on amoxicillin which she took for 3 days. At [facility] she was transitioned to doxycycline (3 days). - Dc'ed doxycycline on - no further indication for treatment. Multinodular goiter  Assessment & Plan  CT Chest 8/3/2023 showed multinodular goiter of thyroid. - TSH normal from 8/3/2023  - Thyroid U/S in outpatient setting upon DC      Disposition: Pt is stable for dc tomorrow (23) pending PT/OT eval, per EP pacemaker placement was succesfull and functioning properly. SUBJECTIVE     Patient seen and examined. No acute events overnight. Pt tolerated well procedure not experiencing N/V, is having regular bowel movements, no fever, chest pain, SOB, incision sites dressed no concerning drainage visible. OBJECTIVE     Vitals:    23 2229 23 0230 23 1104 23 1530   BP:  129/60 144/65 140/68   BP Location:       Pulse:  71 65 62   Resp:       Temp:   97.9 °F (36.6 °C) 97.5 °F (36.4 °C)   TempSrc:       SpO2: 95% 96% 96% 95%   Weight:       Height:          Temperature:   Temp (24hrs), Av.9 °F (36.6 °C), Min:97.5 °F (36.4 °C), Max:98.3 °F (36.8 °C)    Temperature: 97.5 °F (36.4 °C)  Intake & Output:  I/O        0701   0700  0701   0700  0701   0700    P. O. 478 0     I.V. (mL/kg)  150 (1.9)     Total Intake(mL/kg) 478 (5.9) 150 (1.9)     Urine (mL/kg/hr) 1100 (0.6)  350 (0.8)    Total Output 1100  350    Net -622 +150 -350               Weights:   IBW (Ideal Body Weight): 52.4 kg    Body mass index is 31.52 kg/m². Weight (last 2 days)     None        Physical Exam  Vitals and nursing note reviewed. Constitutional:       Appearance: She is obese. HENT:      Head: Normocephalic. Comments: Abrasion on midline of skull (sagital) healing well. Nose: No congestion or rhinorrhea. Eyes:      Pupils: Pupils are equal, round, and reactive to light. Cardiovascular:      Rate and Rhythm: Normal rate. Rhythm irregular.    Pulmonary: Effort: Pulmonary effort is normal.      Breath sounds: Normal breath sounds. Abdominal:      Palpations: Abdomen is soft. Tenderness: There is no abdominal tenderness. Musculoskeletal:         General: Tenderness present. No swelling. Cervical back: Neck supple. Right lower leg: No edema. Left lower leg: No edema. Comments: Back from fall (muscle sprain). Skin:     General: Skin is warm. Findings: Bruising present. Comments: All over arms, healing. Neurological:      Mental Status: She is alert and oriented to person, place, and time. Psychiatric:         Mood and Affect: Mood normal.       LABORATORY DATA     Labs: I have personally reviewed pertinent reports. Results from last 7 days   Lab Units 08/08/23 0445 08/07/23 0441 08/06/23  0438   WBC Thousand/uL 9.40 8.46 7.84   HEMOGLOBIN g/dL 15.4 15.3 14.8   HEMATOCRIT % 46.2* 46.1 45.1   PLATELETS Thousands/uL 216 208 201   NEUTROS PCT % 73 65 71   MONOS PCT % 11 13* 12   EOS PCT % 2 3 2      Results from last 7 days   Lab Units 08/08/23 0445 08/07/23  0441 08/06/23  0438 08/05/23  0503   POTASSIUM mmol/L 4.3 4.2 4.2 3.2*   CHLORIDE mmol/L 101 102 100 99   CO2 mmol/L 24 25 28 25   BUN mg/dL 17 16 10 10   CREATININE mg/dL 1.02 1.04 0.94 0.86   CALCIUM mg/dL 8.7 8.9 9.1 8.5   ALK PHOS U/L  --  56 54 50   ALT U/L  --  18 17 19   AST U/L  --  23 18 21     Results from last 7 days   Lab Units 08/07/23 0441 08/06/23  0438 08/05/23  0503   MAGNESIUM mg/dL 2.2 2.2 1.9          Results from last 7 days   Lab Units 08/07/23  0441 08/05/23  0503 08/04/23  2300 08/04/23  1649 08/04/23  1035   INR  1.12  --  1.19  --  1.21*   PTT seconds  --  74* 77* 73* 35               IMAGING & DIAGNOSTIC TESTING     Radiology Results: I have personally reviewed pertinent reports. XR chest 2 views    Result Date: 8/8/2023  Impression: Pacemaker leads appear to be in proper position.  No acute abnormality in the chest. Workstation performed: [id number]     XR chest portable    Result Date: 8/8/2023  Impression: No acute cardiopulmonary disease. Left-sided permanent pacemaker. Workstation performed: [id number]     Other Diagnostic Testing: I have personally reviewed pertinent reports. ACTIVE MEDICATIONS     Current Facility-Administered Medications   Medication Dose Route Frequency   • acetaminophen (TYLENOL) tablet 650 mg  650 mg Oral Q6H PRN   • apixaban (ELIQUIS) tablet 5 mg  5 mg Oral BID   • aspirin (ECOTRIN LOW STRENGTH) EC tablet 81 mg  81 mg Oral Daily   • atorvastatin (LIPITOR) tablet 20 mg  20 mg Oral Daily   • fluticasone (FLONASE) 50 mcg/act nasal spray 1 spray  1 spray Each Nare BID   • gabapentin (NEURONTIN) capsule 300 mg  300 mg Oral HS   • lidocaine (LIDODERM) 5 % patch 1 patch  1 patch Topical Daily   • lisinopril (ZESTRIL) tablet 10 mg  10 mg Oral Daily   • melatonin tablet 6 mg  6 mg Oral HS   • metoprolol succinate (TOPROL-XL) 24 hr tablet 25 mg  25 mg Oral BID   • polyethylene glycol (MIRALAX) packet 17 g  17 g Oral Daily   • senna-docusate sodium (SENOKOT S) 8.6-50 mg per tablet 1 tablet  1 tablet Oral BID       VTE Pharmacologic Prophylaxis: On eliquis   VTE Mechanical Prophylaxis: sequential compression device    Portions of the record may have been created with voice recognition software. Occasional wrong word or "sound a like" substitutions may have occurred due to the inherent limitations of voice recognition software.   Read the chart carefully and recognize, using context, where substitutions have occurred.  ==  Yamel Jauregui MD  [facility]  Internal Medicine Residency PGY-1

## 2023-08-08 NOTE — DISCHARGE INSTR - OTHER ORDERS
Skin care plans:  1-Hydraguard to bilateral sacrum, buttock and heels BID and PRN  2-Elevate heels to offload pressure. 3-Ehob cushion in chair when out of bed. 4-Moisturize skin daily with skin nourishing cream.  5-Turn/reposition q2h for pressure re-distribution on skin. 6-Scalp wound- Cleanse with NSS, pat dry. Apply Vaseline ointment over wound bed 2 times a day and as needed if ointment is removed. 7-R elbow skin tear- Cleanse wound be with NSS, pat dry. Apply Dermagran over wound bed and cover with small clover Allevyn foam dressing.  Change every other day and as needed

## 2023-08-08 NOTE — PROGRESS NOTES
Progress Note - Electrophysiology  Tami Estrada 80 y.o. female MRN: [id number]  Unit/Bed#: -Asha Encounter: [id number]      Assessment/Plan:  1. Sinus node dysfunction, sinus pauses, symptomatic bradycardia, syncope  S/p Medtronic dual chamber PPM with left bundle pacing lead 8/7/2023  -- device interrogation showed normal device function with stable lead parameters  -- post op chest x-ray 8/7 and 8/8 showed proper lead placement, no pneumothorax  -- left chest implant site healing well  -- keep Aquacell dressing in place x 1 week  -- ok to get incision wet in shower after 1 week  -- arm restrictions x 6 weeks  -- remote monitor paired with device  -- pain control  -- discharge instructions reviewed with patient in detail  -- F/U in 2 weeks for wound check  -- stable for D/C from EP standpoint    2. 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 She underwent implantation of a Medtronic dual chamber PPM with left bundle pacing lead on 8/7/2023 by Dr. Carson Newberry. Today, she is feeling well. She denies any pain at the implant site. She denies chest pain, SOB, lightheadedness, dizziness, orthopnea, or PND. Her incision is clean, dry, and intact without evidence of hematoma or ecchymosis or signs of infection. Vital signs and labs were reviewed. Assessment of chest x-rays show proper lead placement without evidence of pneumothorax. Device interrogation showed appropriate device function with lead parameters such as sensing, threshold, and impedance being tested. Patient denies chest pain/heaviness/tightness/pressure, palpitations, shortness of breath, orthopnea, lightheadedness, presyncope, syncope or N/V.     Telemetry shows Afib HR 60s    EKG 8/7: AfIb    Chest x-ray 8/7/2023:  No acute cardiopulmonary disease. Left-sided permanent pacemaker. Chest x-ray 8/8/2023:  Pacemaker leads appear to be in proper position. No acute abnormality in the chest.    Objective:  Vitals: /65   Pulse 65   Temp 97.9 °F (36.6 °C)   Resp 17   Ht 5' 3" (1.6 m)   Wt 80.7 kg (177 lb 14.6 oz)   SpO2 96%   BMI 31.52 kg/m²     Vitals:    08/04/23 2345 08/05/23 0500   Weight: 81.3 kg (179 lb 3.7 oz) 80.7 kg (177 lb 14.6 oz)     Orthostatic Blood Pressures    Flowsheet Row Most Recent Value   Blood Pressure 144/65 filed at 08/08/2023 1104   Patient Position - Orthostatic VS Lying filed at 08/07/2023 1112            Intake/Output Summary (Last 24 hours) at 8/8/2023 1123  Last data filed at 8/8/2023 0915  Gross per 24 hour   Intake 150 ml   Output 350 ml   Net -200 ml       Invasive Devices     Peripheral Intravenous Line  Duration           Peripheral IV 08/03/23 Right Antecubital 4 days    Peripheral IV 08/04/23 Distal;Right;Ventral (anterior) Forearm 3 days    Peripheral IV 08/07/23 Dorsal (posterior); Left Hand <1 day                Scheduled Meds:  Current Facility-Administered Medications   Medication Dose Route Frequency Provider Last Rate   • acetaminophen  650 mg Oral Q6H PRN Tom Muster, DO     • apixaban  5 mg Oral BID Brady Aceves PA-C     • aspirin  81 mg Oral Daily Tom Muster, DO     • atorvastatin  20 mg Oral Daily Tom Muster, DO     • fluticasone  1 spray Each Nare BID Tom Muster, DO     • gabapentin  300 mg Oral HS Tom Muster, DO     • lidocaine  1 patch Topical Daily Selam Villela, DO     • lisinopril  10 mg Oral Daily Mariela Crespo MD     • melatonin  6 mg Oral HS Tom Muster, DO     • metoprolol succinate  25 mg Oral BID May Bourgeois MD     • polyethylene glycol  17 g Oral Daily Brisa Dye MD     • senna-docusate sodium  1 tablet Oral BID Tom Muster, DO       Continuous Infusions:   PRN Meds:.•  acetaminophen    Review of Systems:  ROS as noted above, otherwise 12 point review of systems was performed and is negative. Physical Exam:  GEN: NAD, alert and oriented x 3, well appearing  SKIN: warm, dry without significant lesions or rashes. Left chest implant site C/D/I, no erythema or hematoma. No signs of infection. HEENT: NCAT  NECK: supple, no JVD appreciated  CARDIOVASCULAR: RRR, normal S1, S2 without murmurs, rubs, or gallops   LUNGS: CTA bilaterally without wheezes, rhonchi, or rales  ABDOMEN: Soft, nontender, nondistended. EXTREMITIES/VASCULAR: perfused without clubbing, cyanosis, or LE edema b/l  PSYCH: Normal mood and affect  NEURO: CN ll-Xll grossly intact      Lab Results: I have personally reviewed pertinent lab results. Results from last 7 days   Lab Units 08/08/23 0445 08/07/23 0441 08/06/23  0438   WBC Thousand/uL 9.40 8.46 7.84   HEMOGLOBIN g/dL 15.4 15.3 14.8   HEMATOCRIT % 46.2* 46.1 45.1   PLATELETS Thousands/uL 216 208 201     Results from last 7 days   Lab Units 08/08/23  0445 08/07/23  0441 08/06/23  0438   POTASSIUM mmol/L 4.3 4.2 4.2   CHLORIDE mmol/L 101 102 100   CO2 mmol/L 24 25 28   BUN mg/dL 17 16 10   CREATININE mg/dL 1.02 1.04 0.94   CALCIUM mg/dL 8.7 8.9 9.1     Results from last 7 days   Lab Units 08/07/23  0441 08/05/23  0503 08/04/23  2300 08/04/23  1649 08/04/23  1035   INR  1.12  --  1.19  --  1.21*   PTT seconds  --  74* 77* 73* 35     Results from last 7 days   Lab Units 08/07/23  0441 08/06/23  0438 08/05/23  0503   MAGNESIUM mg/dL 2.2 2.2 1.9       Imaging: I have personally reviewed pertinent reports.          VTE Pharmacologic Prophylaxis: Reason for no pharmacologic prophylaxis therapeutic on anticoagulation  VTE Mechanical Prophylaxis: sequential compression device

## 2023-08-08 NOTE — WOUND OSTOMY CARE
Consult Note - Wound   Yuliana Liz 80 y.o. female MRN: [id number]  Unit/Bed#: -01 Encounter: [id number]        History and Present Illness: Patient is seen for wound care consult today . The patient is a 80year old female seen for wound care . He had a syncope and collapse and was admitted to the [address] [place] [address] . She has a PMH of HTN, HLD,PAD s/p right SFA and popliteal PCI , hx of CVA and A- Fib  . She had a pacemaker placement . She is continent of bowel and bladder with a Min A to roll in the bed. Alert and pleasant for the assessment . Assessment Findings:   1. Bilateral heels dry and intact   2. Sacral buttocks dry and intact   3. Right elbow partial thickness skin tear 100% pink   4. Top of the scalp dry scabbed area from a abrasion from the fall      No induration, fluctuance, odor, warmth, redness, or purulence noted to the above noted wounds. New dressings applied. Patient tolerated well, reports tenderness to the wounds. Primary nurse aware of plan of care. See flow sheets for more detailed assessment findings. Will follow along. Skin care plans:  1-Hydraguard to bilateral sacrum, buttock and heels BID and PRN  2-Elevate heels to offload pressure. 3-Ehob cushion in chair when out of bed. 4-Moisturize skin daily with skin nourishing cream.  5-Turn/reposition q2h for pressure re-distribution on skin. 6-Scalp wound- Cleanse with NSS, pat dry. Apply Vaseline ointment over wound bed 2 times a day and as needed if ointment is removed. 7-R elbow skin tear- Cleanse wound be with NSS, pat dry. Apply Dermagran over wound bed and cover with small clover Allevyn foam dressing.  Change every other day and as needed      Wounds:  Wound 08/03/23 Skin Tear Abrasion(s) Elbow Posterior;Right (Active)   Wound Image   08/08/23 1105   Wound Description LONG 08/06/23 2008   Marija-wound Assessment Intact;Fragile 08/06/23 2008   Wound Length (cm) 1.7 cm 08/08/23 1105   Wound Width (cm) 1.5 cm 08/08/23 1105 Wound Depth (cm) 0.1 cm 08/08/23 1105   Wound Surface Area (cm^2) 2.55 cm^2 08/08/23 1105   Wound Volume (cm^3) 0.255 cm^3 08/08/23 1105   Calculated Wound Volume (cm^3) 0.26 cm^3 08/08/23 1105   Change in Wound Size % -73.33 08/08/23 1105   Tunneling 0 cm 08/04/23 1210   Undermining 0 08/04/23 1210   Drainage Amount Small 08/06/23 0800   Drainage Description Sanguineous 08/06/23 0800   Non-staged Wound Description Partial thickness 08/06/23 0800   Treatments Cleansed;Irrigation with NSS;Site care 08/04/23 1210   Dressing Dermagran gauze; Foam, Silicon (eg. Allevyn, etc) 08/06/23 2008   Wound packed? No 08/06/23 0800   Dressing Changed Changed 08/04/23 1210   Patient Tolerance Tolerated well 08/05/23 0800   Dressing Status Clean;Dry; Intact 08/07/23 2229       Wound 08/03/23 Traumatic Abrasion(s) Head Superior (Active)   Wound Image   08/08/23 1103   Wound Description Clean;Dry 08/08/23 1400   Marija-wound Assessment Fragile; Intact 08/06/23 2008   Wound Length (cm) 0 cm 08/08/23 1103   Wound Width (cm) 0 cm 08/08/23 1103   Wound Depth (cm) 0 cm 08/08/23 1103   Wound Surface Area (cm^2) 0 cm^2 08/08/23 1103   Wound Volume (cm^3) 0 cm^3 08/08/23 1103   Calculated Wound Volume (cm^3) 0 cm^3 08/08/23 1103   Change in Wound Size % 100 08/08/23 1103   Tunneling 0 cm 08/04/23 1208   Undermining 0 08/04/23 1208   Drainage Amount None 08/06/23 0800   Drainage Description Bloody 08/06/23 0800   Non-staged Wound Description Full thickness 08/06/23 0800   Treatments Cleansed;Irrigation with NSS;Site care 08/04/23 1208   Dressing Open to air 08/06/23 2008   Wound packed? No 08/06/23 0800   Dressing Changed New 08/04/23 1208   Patient Tolerance Tolerated well 08/04/23 1208   Dressing Status Intact 08/06/23 0800       Wound 08/07/23 Incision Chest Left; Anterior (Active)   Wound Description Clean;Dry; Intact 08/07/23 1900   Marija-wound Assessment Clean;Dry; Intact 08/07/23 1900   Wound Site Closure Unable to assess 08/07/23 3753 Dressing Silver dressing 08/07/23 2229   Wound packed? No 08/07/23 2229   Dressing Changed New 08/07/23 1435   Patient Tolerance Tolerated well 08/07/23 2229   Dressing Status Clean;Dry; Intact 08/07/23 2229     Wound care will follow weekly call or tiger text     Mike Coffey RN BSN CWOCN

## 2023-08-08 NOTE — TELEPHONE ENCOUNTER
Patient currently in house, 8/8/23 [facility] appt cancelled.   Will need to be r/s once patient is discharged

## 2023-08-08 NOTE — PLAN OF CARE
Pt oob to chair all am.  C/o feet swelling this am.  Pt reports MD told her she would be here for a few more days. Pt remains v-paced on telemetry.

## 2023-08-09 LAB
ANION GAP SERPL CALCULATED.3IONS-SCNC: 4 MMOL/L
ATRIAL RATE: 300 BPM
BASOPHILS # BLD AUTO: 0.04 THOUSANDS/ÂΜL (ref 0–0.1)
BASOPHILS NFR BLD AUTO: 1 % (ref 0–1)
BUN SERPL-MCNC: 21 MG/DL (ref 5–25)
CALCIUM SERPL-MCNC: 8.7 MG/DL (ref 8.3–10.1)
CHLORIDE SERPL-SCNC: 102 MMOL/L (ref 96–108)
CO2 SERPL-SCNC: 25 MMOL/L (ref 21–32)
CREAT SERPL-MCNC: 0.97 MG/DL (ref 0.6–1.3)
EOSINOPHIL # BLD AUTO: 0.26 THOUSAND/ÂΜL (ref 0–0.61)
EOSINOPHIL NFR BLD AUTO: 3 % (ref 0–6)
ERYTHROCYTE [DISTWIDTH] IN BLOOD BY AUTOMATED COUNT: 13.5 % (ref 11.6–15.1)
GFR SERPL CREATININE-BSD FRML MDRD: 52 ML/MIN/1.73SQ M
GLUCOSE SERPL-MCNC: 102 MG/DL (ref 65–140)
GLUCOSE SERPL-MCNC: 88 MG/DL (ref 65–140)
HCT VFR BLD AUTO: 42.9 % (ref 34.8–46.1)
HGB BLD-MCNC: 14.3 G/DL (ref 11.5–15.4)
IMM GRANULOCYTES # BLD AUTO: 0.04 THOUSAND/UL (ref 0–0.2)
IMM GRANULOCYTES NFR BLD AUTO: 1 % (ref 0–2)
LYMPHOCYTES # BLD AUTO: 1.58 THOUSANDS/ÂΜL (ref 0.6–4.47)
LYMPHOCYTES NFR BLD AUTO: 20 % (ref 14–44)
MCH RBC QN AUTO: 30.9 PG (ref 26.8–34.3)
MCHC RBC AUTO-ENTMCNC: 33.3 G/DL (ref 31.4–37.4)
MCV RBC AUTO: 93 FL (ref 82–98)
MONOCYTES # BLD AUTO: 0.96 THOUSAND/ÂΜL (ref 0.17–1.22)
MONOCYTES NFR BLD AUTO: 12 % (ref 4–12)
NEUTROPHILS # BLD AUTO: 5.08 THOUSANDS/ÂΜL (ref 1.85–7.62)
NEUTS SEG NFR BLD AUTO: 63 % (ref 43–75)
NRBC BLD AUTO-RTO: 0 /100 WBCS
P AXIS: 109 DEGREES
PLATELET # BLD AUTO: 201 THOUSANDS/UL (ref 149–390)
PMV BLD AUTO: 10.3 FL (ref 8.9–12.7)
POTASSIUM SERPL-SCNC: 4.4 MMOL/L (ref 3.5–5.3)
QRS AXIS: -55 DEGREES
QRSD INTERVAL: 92 MS
QT INTERVAL: 360 MS
QTC INTERVAL: 462 MS
RBC # BLD AUTO: 4.63 MILLION/UL (ref 3.81–5.12)
SODIUM SERPL-SCNC: 131 MMOL/L (ref 135–147)
T WAVE AXIS: 103 DEGREES
VENTRICULAR RATE: 99 BPM
WBC # BLD AUTO: 7.96 THOUSAND/UL (ref 4.31–10.16)

## 2023-08-09 PROCEDURE — 97116 GAIT TRAINING THERAPY: CPT

## 2023-08-09 PROCEDURE — 85025 COMPLETE CBC W/AUTO DIFF WBC: CPT

## 2023-08-09 PROCEDURE — 99232 SBSQ HOSP IP/OBS MODERATE 35: CPT | Performed by: INTERNAL MEDICINE

## 2023-08-09 PROCEDURE — 93010 ELECTROCARDIOGRAM REPORT: CPT | Performed by: INTERNAL MEDICINE

## 2023-08-09 PROCEDURE — 80048 BASIC METABOLIC PNL TOTAL CA: CPT

## 2023-08-09 PROCEDURE — 97535 SELF CARE MNGMENT TRAINING: CPT

## 2023-08-09 PROCEDURE — 82948 REAGENT STRIP/BLOOD GLUCOSE: CPT

## 2023-08-09 PROCEDURE — 97162 PT EVAL MOD COMPLEX 30 MIN: CPT

## 2023-08-09 PROCEDURE — 97167 OT EVAL HIGH COMPLEX 60 MIN: CPT

## 2023-08-09 RX ADMIN — METOPROLOL SUCCINATE 25 MG: 25 TABLET, FILM COATED, EXTENDED RELEASE ORAL at 18:02

## 2023-08-09 RX ADMIN — MELATONIN 6 MG: at 21:06

## 2023-08-09 RX ADMIN — ASPIRIN 81 MG: 81 TABLET, COATED ORAL at 08:51

## 2023-08-09 RX ADMIN — FLUTICASONE PROPIONATE 1 SPRAY: 50 SPRAY, METERED NASAL at 08:55

## 2023-08-09 RX ADMIN — SENNOSIDES AND DOCUSATE SODIUM 1 TABLET: 50; 8.6 TABLET ORAL at 08:51

## 2023-08-09 RX ADMIN — GABAPENTIN 300 MG: 300 CAPSULE ORAL at 21:06

## 2023-08-09 RX ADMIN — APIXABAN 5 MG: 5 TABLET, FILM COATED ORAL at 17:55

## 2023-08-09 RX ADMIN — POLYETHYLENE GLYCOL 3350 17 G: 17 POWDER, FOR SOLUTION ORAL at 08:51

## 2023-08-09 RX ADMIN — ATORVASTATIN CALCIUM 20 MG: 20 TABLET, FILM COATED ORAL at 08:51

## 2023-08-09 RX ADMIN — FLUTICASONE PROPIONATE 1 SPRAY: 50 SPRAY, METERED NASAL at 17:55

## 2023-08-09 RX ADMIN — METOPROLOL SUCCINATE 25 MG: 25 TABLET, FILM COATED, EXTENDED RELEASE ORAL at 08:51

## 2023-08-09 RX ADMIN — LISINOPRIL 10 MG: 10 TABLET ORAL at 08:52

## 2023-08-09 RX ADMIN — APIXABAN 5 MG: 5 TABLET, FILM COATED ORAL at 08:51

## 2023-08-09 NOTE — PROGRESS NOTES
-- Patient:  -- MRN: [id number]  -- Aidin Request ID: [id number]  -- Level of care reserved: Kelly Okeefe  -- Partner Reserved: [facility] AT [address] [phone]  -- Clinical needs requested:  -- Geography searched: [id number]  -- Start of Service:  -- Request sent: 2:11pm EDT on 8/9/2023 by Sheyla Sargent  -- Partner reserved: 2:48pm EDT on 8/9/2023 by Sigifredo Encinas  -- Choice list shared:

## 2023-08-09 NOTE — PHYSICAL THERAPY NOTE
Physical Therapy Evaluation + Treatment    Patient's Name: Tanvi Zeng    Admitting Diagnosis  Syncope and collapse [R55]    Problem List  Patient Active Problem List   Diagnosis    Paroxysmal A-fib (HCC)    Chronic anticoagulation    Hypertension, essential    Combined hyperlipidemia    History of left knee replacement    Atherosclerosis of artery of both lower extremities (HCC)    Stage 3a chronic kidney disease (HCC)    Syncope and collapse    Hyponatremia    Sinus pain    Pre-syncope    Sinus pause    Lung nodule seen on imaging study    Multinodular goiter    s/p Medtronic dual chamber PPM with left bundle pacing lead 8/7/2023       Past Medical History  Past Medical History:   Diagnosis Date    PMR (polymyalgia rheumatica) (HCC)     s/p Medtronic dual chamber PPM with left bundle pacing lead 8/7/2023 8/8/2023    Stroke [facility])        Past Surgical History  Past Surgical History:   Procedure Laterality Date    CARDIAC ELECTROPHYSIOLOGY PROCEDURE N/A 8/7/2023    Procedure: Cardiac pacer implant;  Surgeon: Karolina Herrera DO;  Location: [facility]; Service: Cardiology    IR AORTAGRAM WITH RUN-OFF  3/9/2021    REPLACEMENT TOTAL KNEE            08/09/23 0930   PT Last Visit   PT Visit Date 08/09/23   Note Type   Note type Evaluation   Pain Assessment   Pain Assessment Tool 0-10   Pain Score 3   Pain Location/Orientation Location: Back   Pain Onset/Description Onset: Ongoing;Frequency: Intermittent; Descriptor: Discomfort   Effect of Pain on Daily Activities limtied mobility   Patient's Stated Pain Goal No pain   Hospital Pain Intervention(s) Repositioned; Ambulation/increased activity; Emotional support   Restrictions/Precautions   Weight Bearing Precautions Per Order No   Other Precautions Pain;Hard of hearing  (pacemaker precautions)   Home Living   Type of [address] [place] [address] One level;Stairs to enter with rails; Performs ADLs on one level; Able to live on main level with bedroom/bathroom; Work area in [facility] with 4 CALVIN ; cellar with stair glide access)   Bathroom Shower/Tub   (spongebathes)   Bathroom Toilet Raised   Bathroom Equipment Shower chair   Bathroom Accessibility Accessible   Home Equipment Walker;Cane  (RW used PTA)   Prior Function   Level of [place] Independent with ADLs; Independent with functional mobility; Independent with IADLS   Lives With (S)  Alone   Receives Help From Family; Neighbor  (patient's daughter is planning on staying with patient for 4-5 days on D/C, but then is not local. Patient has supportive neighbors.)   IADLs Independent with driving; Independent with meal prep; Independent with medication management   Falls in the last 6 months 0   Vocational Retired   General   Family/Caregiver Present No   Cognition   Overall Cognitive Status WFL   Arousal/Participation Alert   Orientation Level Oriented X4   Memory Within functional limits   Following Commands Follows one step commands with increased time or repetition   Comments Patient very [place], but overall pleasant and cooperative. Subjective   Subjective Patient agreeable to PT treatment. RUE Assessment   RUE Assessment WFL   LUE Assessment   LUE Assessment WFL  (within precautions)   RLE Assessment   RLE Assessment WFL   LLE Assessment   LLE Assessment WFL   Bed Mobility   Supine to Sit Unable to assess   Sit to Supine Unable to assess   Additional Comments Patient sitting EOB on arrival and left in bedside chair.    Transfers   Sit to Stand 5  Supervision   Stand to Sit 5  Supervision   Additional Comments RW   Ambulation/Elevation   Gait pattern WNL   Gait Assistance 5  Supervision   Assistive Device Rolling walker   Distance 200' + 25'   Stair Management Assistance 5  Supervision   Stair Management Technique One rail R   Number of Stairs 4   Ambulation/Elevation Additional Comments good stability noted   Balance   Static Sitting Normal   Dynamic Sitting Good   Static Standing Fair +   Dynamic Standing Fair Ambulatory Fair   Endurance Deficit   Endurance Deficit No   Activity Tolerance   Activity Tolerance Patient tolerated treatment well   Medical Staff Made Aware OT [address]   Nurse Made Aware RN cleared   Assessment   Prognosis Good   Problem List Pain   Assessment Pt is a 80 y.o. female seen for a moderate complexity PT evaluation due to Ongoing medical management for primary dx, Decreased activity tolerance compared to baseline, Fall risk, s/p surgical intervention. Patient is s/p admit to [address] [place] [address] on 8/4/2023 for Syncope and collapse (R55). Patient  has a past medical history of PMR (polymyalgia rheumatica) ([address]), s/p Medtronic dual chamber PPM with left bundle pacing lead 8/7/2023, and Stroke ([address]). .     PT now consulted to assess functional mobility and needs for safe d/c planning. Prior to admission, pt was I with functional mobility, ADLs, and IADLs. Personal factors affecting status include [address] with 4 CALVIN, living alone. Currently pt requires Supervision for functional transfers; Supervision for ambulation with RW, 200'+25'. Patient also performs 4 stairs using RHR with supervision. Pt presents functioning at or near baseline level of function. Patient states no concerns with functional mobility at present. Patient is encouraged to continue ambulating with staff assist as able in order to maintain current LOF. The patient's AM-PAC Basic Mobility Inpatient Short Form Raw Score Is 20. A Raw score of greater than or equal to 16 suggests the patient may benefit from discharge to home. Plan is for patient to d/c home with HHPT. Patient states agreement and notes no concerns at present. Due to patient current status, plan to sign off formal inpatient PT services at this time. Please re-consult should current status change.    Barriers to Discharge Decreased caregiver support  (daughter to stay with patient on d/c)   Goals   Patient Goals to go home   PT Treatment Day 0   Plan Treatment/Interventions   (eval +tx)   PT Frequency   (eval + tx)   Recommendation   PT Discharge Recommendation Home with home health rehabilitation   Equipment Recommended [employer]  (owns)   Michelineanastasia Matt Recommended Wheeled walker   AM-PAC Basic Mobility Inpatient   Turning in Flat Bed Without Bedrails 4   Lying on Back to Sitting on Edge of Flat Bed Without Bedrails 4   Moving Bed to Chair 3   Standing Up From Chair Using Arms 3   Walk in Room 3   Climb 3-5 Stairs With Railing 3   Basic Mobility Inpatient Raw Score 20   Basic Mobility Standardized Score 43.99   Highest Level Of Mobility   JH-HLM Goal 6: Walk 10 steps or more   JH-HLM Achieved 7: Walk 25 feet or more   Modified Victorino Scale   Modified Victorino Scale 2   Additional Treatment Session   Start Time 0912   End Time 0930   Treatment Assessment Patient performs ambulation 200 ft using RW. Mild fatigue noted, and takes seated rest period prior to stair training. Patient negotiates 4 steps with supervision with RHR. Patient with good stability overall. Patient appears to be at or near baseline level of function, but would benefit from home PT in order to increase strength and endurance for improved safety and QOL. Equipment Use RW   End of Consult   Patient Position at End of Consult Bedside chair; All needs within reach;Bed/Chair alarm activated         Zelda Chicas, PT, DPT

## 2023-08-09 NOTE — PROGRESS NOTES
INTERNAL MEDICINE RESIDENCY PROGRESS NOTE     Name: Leroy Young   Age & Sex: 80 y.o. female   MRN: [id number]  Unit/Bed#: -Asha   Encounter: [id number]  Team: SOD Team C     PATIENT INFORMATION     Name: Leroy Young   Age & Sex: 80 y.o. female   MRN: [id number]  Hospital Stay Days: 5    ASSESSMENT/PLAN     Principal Problem:    Sinus pause  Active Problems:    Paroxysmal A-fib ([address])    Hypertension, essential    Combined hyperlipidemia    Stage 3a chronic kidney disease ([address])    Syncope and collapse    Hyponatremia    Sinus pain    Lung nodule seen on imaging study    Multinodular goiter    s/p Medtronic dual chamber PPM with left bundle pacing lead 8/7/2023      Multinodular goiter  Assessment & Plan  CT Chest 8/3/2023 showed multinodular goiter of thyroid. - TSH normal from 8/3/2023  - Thyroid U/S in outpatient setting upon DC    Lung nodule seen on imaging study  Assessment & Plan  CT Chest 8/3/2023 showed. "No acute posttraumatic abnormality.     1.1 cm left lower lobe nodule. No prior studies. Based on current Fleischner Society 2017 Guidelines on incidental pulmonary nodule, either PET/CT scan evaluation, tissue sampling or short term interval followup non-contrast CT followup (initially in 3   months) may be considered appropriate.     Multinodular goiter. Thyroid ultrasound recommended. - Found incidentally on imaging. Follow-up in outpatient setting upon DC    Sinus pain  Assessment & Plan  Pt saw her pcp on 8/1 for sinus congestion/pain. Her PCP started her on amoxicillin which she took for 3 days. At [address] she was transitioned to doxycycline (3 days). - Dc'ed doxycycline on 8/7- no further indication for treatment. Hyponatremia  Assessment & Plan  Sodium on admission at [address] was 128 and improved to 129, now at 132 and stable on 8/7 also at 132, improved to 134 8/8, likely 2/2 dehydration.  Continues to be hyponatremic to 131 (8/9), pt likely at baseline, appears to be hydrating well. Plan:  · CMP, monitor sodium  · Maintenance fluids  · Will continue to encourage adequate hydration on discharge    Syncope and collapse  Assessment & Plan  See Assessment for "Sinus pause"    Plan:  · Lidocaine patch for back pain  · Tylenol prn  · Ice packs for back pain at pt request     Stage 3a chronic kidney disease [facility])  Assessment & Plan  Lab Results   Component Value Date    EGFR 55 08/04/2023    EGFR 47 08/03/2023    EGFR 47 03/23/2021    CREATININE 0.93 08/04/2023    CREATININE 1.05 08/03/2023    CREATININE 1.07 03/23/2021     -Avoid nephrotoxic agents    Combined hyperlipidemia  Assessment & Plan  - Continue home atorvastatin 20 mg daily    Hypertension, essential  Assessment & Plan  Home meds include, lisinopril/HCTZ and metoprolol. Plan:  · Was on lisinopril 5 mg at [facility]  · Increased to home lisinopril 10 mg   · Became hypertensive to SBPs > 180  · Was given one-time dose of hydralazine with improvement in BP  · BP has been stable since  · Continue BP monitoring    Paroxysmal A-fib (HCC)  Assessment & Plan  Rate controlled. Home eliquis. Plan:  · Was initially holding home eliquis for possible pacemaker placement today, but procedure will take place Monday, per EP  · Restart home eliquis, will hold nighttime dose tomorrow and morning dose on the day of procedure (held on night of 8/6 and AM 8/7)  · Back on eliquis today  · Hold home metoprolol due to bradycardia/sinus pauses  · Back on metoprolol today (8/8/23). * Sinus pause  Assessment & Plan  Pt  presented to [facility] with syncopal event. Pt reports feeling "funny" and lightheaded and fell on her knees and hit her head on the wall. Pt states that she lost consciousness for under 30 seconds and called her neighbor and they brought her to [facility] ED. CT head unremarkable, showed mild midline scalp swelling. Does complain of some lower back pain s/p fall. CT C and T spine negative.  On tele was found to have sinus pauses and bradycardia in the 40s. She was transferred to [address] [place] [address] for EP evaluation. TTE done today at [address] [facility] showed EF of 65%. K at 3.2 and Mg at 1.9 on . Plan:  · S/P Medtronic dual chamber PPM with left bundle pacing lead  · Restarted home metoprolol succinate 25 mg daily  · On eliquis 5 mg  · Lyme disease pending  · Stable for discharge      Disposition: Pt remain inpatient due to transport issue today, pickup/dc tomorrow. SUBJECTIVE     Patient seen and examined. No acute events overnight. Pt feels much improved today, no chest pain, sob, lightheadedness or vertigo, N/V. Back pain persists but improving with ice. OBJECTIVE     Vitals:    23 0257 23 0257 23 0715 23 1109   BP: 129/72  130/70 101/55   BP Location:   Right arm Right arm   Pulse:  63 65 75   Resp:  18 18 18   Temp:  98 °F (36.7 °C) 97.9 °F (36.6 °C) 98 °F (36.7 °C)   TempSrc:   Oral Oral   SpO2:  98% 95% 95%   Weight:       Height:          Temperature:   Temp (24hrs), Av.9 °F (36.6 °C), Min:97.9 °F (36.6 °C), Max:98 °F (36.7 °C)    Temperature: 98 °F (36.7 °C)  Intake & Output:  I/O        0701   0700  0701   0700  0701  08/10 0700    P. O. 0 1340 900    I.V. (mL/kg) 150 (1.9)      Total Intake(mL/kg) 150 (1.9) 1340 (16.6) 900 (11.2)    Urine (mL/kg/hr)  350 (0.2)     Total Output  350     Net +150 +990 +900           Unmeasured Urine Occurrence  3 x         Weights:   IBW (Ideal Body Weight): 52.4 kg    Body mass index is 31.52 kg/m². Weight (last 2 days)     None        Physical Exam  Vitals and nursing note reviewed. Constitutional:       General: She is not in acute distress. Appearance: She is not ill-appearing or diaphoretic. HENT:      Head: Normocephalic. Comments: Midline scalp abrasion healing.       Mouth/Throat:      Mouth: Mucous membranes are moist.   Eyes:      Extraocular Movements: Extraocular movements intact. Pupils: Pupils are equal, round, and reactive to light. Cardiovascular:      Rate and Rhythm: Normal rate. Rhythm irregular. Pulmonary:      Effort: Pulmonary effort is normal.      Breath sounds: Normal breath sounds. Abdominal:      Palpations: Abdomen is soft. Tenderness: There is no abdominal tenderness. Musculoskeletal:      Right lower leg: No edema. Left lower leg: No edema. Skin:     General: Skin is warm. Findings: Bruising present. Comments: All over arms from fall, healing. Neurological:      Mental Status: She is alert and oriented to person, place, and time. Psychiatric:         Mood and Affect: Mood normal.       LABORATORY DATA     Labs: I have personally reviewed pertinent reports. Results from last 7 days   Lab Units 08/09/23 0517 08/08/23 0445 08/07/23 0441   WBC Thousand/uL 7.96 9.40 8.46   HEMOGLOBIN g/dL 14.3 15.4 15.3   HEMATOCRIT % 42.9 46.2* 46.1   PLATELETS Thousands/uL 201 216 208   NEUTROS PCT % 63 73 65   MONOS PCT % 12 11 13*   EOS PCT % 3 2 3      Results from last 7 days   Lab Units 08/09/23  0517 08/08/23 0445 08/07/23  0441 08/06/23  0438 08/05/23  0503   POTASSIUM mmol/L 4.4 4.3 4.2 4.2 3.2*   CHLORIDE mmol/L 102 101 102 100 99   CO2 mmol/L 25 24 25 28 25   BUN mg/dL 21 17 16 10 10   CREATININE mg/dL 0.97 1.02 1.04 0.94 0.86   CALCIUM mg/dL 8.7 8.7 8.9 9.1 8.5   ALK PHOS U/L  --   --  56 54 50   ALT U/L  --   --  18 17 19   AST U/L  --   --  23 18 21     Results from last 7 days   Lab Units 08/07/23 0441 08/06/23  0438 08/05/23  0503   MAGNESIUM mg/dL 2.2 2.2 1.9          Results from last 7 days   Lab Units 08/07/23 0441 08/05/23  0503 08/04/23  2300 08/04/23  1649 08/04/23  1035   INR  1.12  --  1.19  --  1.21*   PTT seconds  --  74* 77* 73* 35               IMAGING & DIAGNOSTIC TESTING     Radiology Results: I have personally reviewed pertinent reports.   XR chest 2 views    Result Date: 8/8/2023  Impression: Pacemaker leads appear to be in proper position. No acute abnormality in the chest. Workstation performed: [id number]     XR chest portable    Result Date: 8/8/2023  Impression: No acute cardiopulmonary disease. Left-sided permanent pacemaker. Workstation performed: [id number]     Other Diagnostic Testing: I have personally reviewed pertinent reports. ACTIVE MEDICATIONS     Current Facility-Administered Medications   Medication Dose Route Frequency   • acetaminophen (TYLENOL) tablet 650 mg  650 mg Oral Q6H PRN   • apixaban (ELIQUIS) tablet 5 mg  5 mg Oral BID   • aspirin (ECOTRIN LOW STRENGTH) EC tablet 81 mg  81 mg Oral Daily   • atorvastatin (LIPITOR) tablet 20 mg  20 mg Oral Daily   • fluticasone (FLONASE) 50 mcg/act nasal spray 1 spray  1 spray Each Nare BID   • gabapentin (NEURONTIN) capsule 300 mg  300 mg Oral HS   • lidocaine (LIDODERM) 5 % patch 1 patch  1 patch Topical Daily   • lisinopril (ZESTRIL) tablet 10 mg  10 mg Oral Daily   • melatonin tablet 6 mg  6 mg Oral HS   • metoprolol succinate (TOPROL-XL) 24 hr tablet 25 mg  25 mg Oral BID   • polyethylene glycol (MIRALAX) packet 17 g  17 g Oral Daily   • senna-docusate sodium (SENOKOT S) 8.6-50 mg per tablet 1 tablet  1 tablet Oral BID       VTE Pharmacologic Prophylaxis: RX contraindicated due to: on eliquis  VTE Mechanical Prophylaxis: sequential compression device    Portions of the record may have been created with voice recognition software. Occasional wrong word or "sound a like" substitutions may have occurred due to the inherent limitations of voice recognition software.   Read the chart carefully and recognize, using context, where substitutions have occurred.  ==  [address] [place] [address]

## 2023-08-09 NOTE — CASE MANAGEMENT
Case Management Discharge Planning Note    Patient name Marilynn Joyce  Location /-86 MRN [id number]  : 1935 Date 2023       Current Admission Date: 2023  Current Admission Diagnosis:Sinus pause   Patient Active Problem List    Diagnosis Date Noted   • s/p Medtronic dual chamber PPM with left bundle pacing lead 2023   • Pre-syncope 2023   • Sinus pause 2023   • Lung nodule seen on imaging study 2023   • Multinodular goiter 2023   • Syncope and collapse 2023   • Hyponatremia 2023   • Sinus pain 2023   • Stage 3a chronic kidney disease ([address]) 2021   • Atherosclerosis of artery of both lower extremities ([address]) 2021   • Paroxysmal A-fib ([address]) 2018   • Chronic anticoagulation 2018   • Hypertension, essential 2018   • Combined hyperlipidemia 2018   • History of left knee replacement 2018      LOS (days): 5  Geometric Mean LOS (GMLOS) (days): 2.90  Days to GMLOS:-1.8     OBJECTIVE:  Risk of Unplanned Readmission Score: 11.31         Current admission status: Inpatient   Preferred Pharmacy:   [id number] [address], [address]  Phone: [phone] Fax: [phone]    Primary Care Provider: Wen Hunt MD    Primary Insurance: MEDICARE  Secondary Insurance: [address]:    [address] [place] [address]         Is the patient interested in [facility] at discharge?: Yes  [facility] requested[de-identified] Nursing, Occupational Therapy, Physical Therapy, [address] [place] [address] Name[de-identified] [facility] External Referral Reason (only applicable if external [facility] name selected): Services not provided in network or near patient location  [facility] Provider[de-identified] PCP  Home Health Services Needed[de-identified] Evaluate Functional Status and Safety, Strengthening/Theraputic Exercises to Improve Function, Other (comment) (medication management)  Homebound Criteria Met[de-identified] Requires the Assistance of Another Person for Safe Ambulation or to Leave the Home, Uses an Assist Device (i.e. cane, walker, etc)  Supporting Clincal Findings[de-identified] Limited Endurance, Fatigues Easliy in Short Distances    IMM Given (Date):: 08/09/23  IMM Given to[de-identified] Patient   IMM reviewed with patient, patient agrees with discharge determination.

## 2023-08-09 NOTE — DISCHARGE SUMMARY
INTERNAL MEDICINE RESIDENCY DISCHARGE SUMMARY     Justine Marc   80 y.o. female  MRN: [id number]  Room/Bed: /MS [address] MED SURG 5   Encounter: [id number]    Principal Problem:    Sinus pause  Active Problems:    Syncope and collapse    Paroxysmal A-fib (HCC)    Hypertension, essential    Combined hyperlipidemia    Stage 3a chronic kidney disease (HCC)    Hyponatremia    Sinus pain    Lung nodule seen on imaging study    Multinodular goiter    s/p Medtronic dual chamber PPM with left bundle pacing lead 8/7/2023      * Sinus pause  Assessment & Plan  Pt  presented to [facility] with syncopal event. Pt reports feeling "funny" and lightheaded and fell on her knees and hit her head on the wall. Pt states that she lost consciousness for under 30 seconds and called her neighbor and they brought her to [facility] ED. CT head unremarkable, showed mild midline scalp swelling. Does complain of some lower back pain s/p fall. CT C and T spine negative. On tele was found to have sinus pauses and bradycardia in the 40s. She was transferred to [facility] for EP evaluation. TTE done today at [facility] showed EF of 65%. K at 3.2 and Mg at 1.9 on 08/05. Plan:  · S/P Medtronic dual chamber PPM with left bundle pacing lead  · Restarted home metoprolol succinate 25 mg daily  · On eliquis 5 mg  · Lyme disease pending  · Stable for discharge    Syncope and collapse  Assessment & Plan  See Assessment for "Sinus pause"    Plan:  · Lidocaine patch for back pain  · Tylenol prn  · Ice packs for back pain at pt request     Paroxysmal A-fib [facility])  Assessment & Plan  Rate controlled. Home eliquis.     Plan:  · Was initially holding home eliquis for possible pacemaker placement today, but procedure will take place Monday, per EP  · Restart home eliquis, will hold nighttime dose tomorrow and morning dose on the day of procedure (held on night of 8/6 and AM 8/7)  · Back on eliquis today  · Hold home metoprolol due to bradycardia/sinus pauses  · Back on metoprolol today (8/8/23). Hypertension, essential  Assessment & Plan  Home meds include, lisinopril/HCTZ and metoprolol. Plan:  · Was on lisinopril 5 mg at [facility]  · Increased to home lisinopril 10 mg   · Became hypertensive to SBPs > 180  · Was given one-time dose of hydralazine with improvement in BP  · BP has been stable since  · Continue BP monitoring    Combined hyperlipidemia  Assessment & Plan  - Continue home atorvastatin 20 mg daily    Stage 3a chronic kidney disease [facility])  Assessment & Plan  Lab Results   Component Value Date    EGFR 55 08/04/2023    EGFR 47 08/03/2023    EGFR 47 03/23/2021    CREATININE 0.93 08/04/2023    CREATININE 1.05 08/03/2023    CREATININE 1.07 03/23/2021     -Avoid nephrotoxic agents    Hyponatremia  Assessment & Plan  Sodium on admission at [address] [address] was 128 and improved to 129, now at 132 and stable on 8/7 also at 132, improved to 134 8/8, likely 2/2 dehydration. Continues to be hyponatremic to 131 (8/9), pt likely at baseline, appears to be hydrating well. Plan:  · CMP, monitor sodium  · Maintenance fluids  · Will continue to encourage adequate hydration on discharge    Lung nodule seen on imaging study  Assessment & Plan  CT Chest 8/3/2023 showed. "No acute posttraumatic abnormality.     1.1 cm left lower lobe nodule. No prior studies. Based on current Fleischner Society 2017 Guidelines on incidental pulmonary nodule, either PET/CT scan evaluation, tissue sampling or short term interval followup non-contrast CT followup (initially in 3   months) may be considered appropriate.     Multinodular goiter. Thyroid ultrasound recommended. - Found incidentally on imaging. Follow-up in outpatient setting upon DC    Sinus pain  Assessment & Plan  Pt saw her pcp on 8/1 for sinus congestion/pain. Her PCP started her on amoxicillin which she took for 3 days. At [address] [address] she was transitioned to doxycycline (3 days). - Dc'ed doxycycline on 8/7- no further indication for treatment. Multinodular goiter  Assessment & Plan  CT Chest 8/3/2023 showed multinodular goiter of thyroid. - TSH normal from 8/3/2023  - Thyroid U/S in outpatient setting upon [address] Glenys Marks is a 80 y.o. female with a hx of HTN, CKD3s, PAD, Afib on eliquis, who presented from home to [facility] ED (teja in by neighbor) due to presyncopal episode on 8/3/23. The patient felt lightheaded and fell to the ground from her own height, hitting her head and elbow on her way down. She denied any complete loss of consciousness (LOC < 30s) and appeared to remember the whole event, she mentioned feeling dizzy and fatigued prior to the event and for a few weeks prior. She denied any sphincter release or palpitations, chest pain or SOB prior to the event. She was recently started on amoxicillin which was switched to doxy and discontinued here given no indications for antibiotic treatment with sufficient coverage (3 days of amoxicillin, 3 days of doxy) for presumed sinusitis in the outpatient setting. The ED course at [facility] included unremarkable CT head (mild mid scalp swelling), CT spine (cervical thoracic) unremarkable as well. Negative troponin, echo showed an EF of 65% and EKG showed Afib with SVR. She was placed on tele which revealed sinus pauses with bradycardia ~40bpms. Cardio was consulted and rec was made for transfer to us for an EP eval for likely sinus sick syndrome. EP saw her on 8/5 and recommended a L side dual chamber permanent pacemaker. To note CT chest showed some incidental findings that will require outpatient f/u (multinodular goiter with need US and 1.1 cm lung nodule will need 3 mo f/u imaging).      Pacemaker was placed on 8/7 by EP succesfully no post-op complications other than some L shoulder soreness at site of implantation and residual back pain from fall. On 8/8 EP interrogated the device which showed normal function and stable lead parameters. Post op CXR on 8/7 and 8/8 showed proper placement no pneumothorax or effusion noted. Sai Matosu notes she will need assistance at home while recovering, PT/OT recommended home therapy (coordinated by CM). Pt feels much improved today no complaints on interview aside from residual back pain which has improved with ice packs, she is excited to go home. DISCHARGE INFORMATION     PCP at Discharge: Chavez Tipton MD    Admitting Provider: Neptali Martinez MD  Admission Date: 8/4/2023    Discharge Provider: Alicia Merino MD  Discharge Date: 8/10/23    Discharge Disposition: [facility]  Discharge Condition: good  Discharge with Lines: no    Discharge Diet: cardiac diet  Activity Restrictions: keep aquacell dressing in place (1 week, after can get incision wet in shower) no overhead lifting (arm restriction for 6 weeks). Test Results Pending at Discharge: no    Discharge Diagnoses:  Principal Problem:    Sinus pause  Active Problems:    Syncope and collapse    Paroxysmal A-fib (HCC)    Hypertension, essential    Combined hyperlipidemia    Stage 3a chronic kidney disease (HCC)    Hyponatremia    Sinus pain    Lung nodule seen on imaging study    Multinodular goiter    s/p Medtronic dual chamber PPM with left bundle pacing lead 8/7/2023  Resolved Problems:    * No resolved hospital problems. *      Consulting Providers: EP, Wound care, Cardiology      Diagnostic & Therapeutic Procedures Performed:  XR chest 2 views    Result Date: 8/8/2023  Impression: Pacemaker leads appear to be in proper position. No acute abnormality in the chest. Workstation performed: [id number]     XR chest portable    Result Date: 8/8/2023  Impression: No acute cardiopulmonary disease. Left-sided permanent pacemaker.  Workstation performed: [id number]       Code Status: Level 3 - DNAR and DNI  Advance Directive & Living Will: <no information>  Power of :    POLST:      Medications:  Current Discharge Medication List        Current Discharge Medication List        Current Discharge Medication List      CONTINUE these medications which have NOT CHANGED    Details   acetaminophen-codeine (TYLENOL #3) 300-30 mg per tablet       aspirin (ECOTRIN LOW STRENGTH) 81 mg EC tablet Take 81 mg by mouth daily      atorvastatin (LIPITOR) 20 mg tablet Take 1 tablet by mouth daily      cholecalciferol (VITAMIN D3) 400 units tablet Take 400 Units by mouth daily      ELIQUIS 5 MG Take 5 mg by mouth 2 (two) times a day  Refills: 3      fluticasone (FLONASE) 50 mcg/act nasal spray SPRAY 2 SPRAYS INTO EACH NOSTRIL EVERY DAY      gabapentin (NEURONTIN) 300 mg capsule Take 300 mg by mouth daily at bedtime      lisinopril-hydrochlorothiazide (PRINZIDE,ZESTORETIC) 20-12.5 MG per tablet Take 0.5 tablets by mouth daily      loratadine (CLARITIN) 10 mg tablet Take 10 mg by mouth daily  Refills: 5      metoprolol succinate (TOPROL-XL) 25 mg 24 hr tablet Take 1 tablet (25 mg total) by mouth daily Take 1 tablet daily    Associated Diagnoses: Paroxysmal A-fib (HCC)      predniSONE 10 mg tablet Take 5 mg by mouth daily       Sennosides (LAXATIVE PILLS PO) Take 1 tablet by mouth Twice daily      Sodium Fluoride 5000 PPM 1.1 % GEL BRUSH TWICE DAILY      traMADol (ULTRAM) 50 mg tablet TAKE 1 TAB BY MOUTH 2 TIMES A DAY AS NEEDED FOR PAIN, SEVERE. Allergies:   Allergies   Allergen Reactions   • Hydrocodone    • Oxycodone Anxiety and Headache       FOLLOW-UP     PCP Outpatient Follow-up:  Yes, tel for appointment [phone]  Location: Candace Avendano MD general practive [address] [facility], [address] [place] [address]  Follow up within next: 2 weeks    Consulting Providers Follow-up:  yes      Physician name: Rc Shaffer DO  Location: [address] [facility],[address] [address] [place] [address], [facility]  Specialty: Cardiac Electrophysiology  Office phone number: [phone]  Follow up: In 2 weeks (around 8/23)    Active Issues Requiring Follow-up:   none    Discharge Statement:   I spent 30 minutes minutes discharging the patient. This time was spent on the day of discharge. I had direct contact with the patient on the day of discharge. Additional documentation is required if more than 30 minutes were spent on discharge. Portions of the record may have been created with voice recognition software. Occasional wrong word or "sound a like" substitutions may have occurred due to the inherent limitations of voice recognition software.   Read the chart carefully and recognize, using context, where substitutions have occurred.    ==  Sofie Canavan, MD  [address] [facility]  Internal Medicine Residency PGY-1

## 2023-08-09 NOTE — CASE MANAGEMENT
Case Management Assessment & Discharge Planning Note    Patient name Elvin Zhao  Location /-22 MRN [id number]  : 1935 Date 2023       Current Admission Date: 2023  Current Admission Diagnosis:Sinus pause   Patient Active Problem List    Diagnosis Date Noted   • s/p Medtronic dual chamber PPM with left bundle pacing lead 2023   • Pre-syncope 2023   • Sinus pause 2023   • Lung nodule seen on imaging study 2023   • Multinodular goiter 2023   • Syncope and collapse 2023   • Hyponatremia 2023   • Sinus pain 2023   • Stage 3a chronic kidney disease ([address]) 2021   • Atherosclerosis of artery of both lower extremities ([address]) 2021   • Paroxysmal A-fib ([address]) 2018   • Chronic anticoagulation 2018   • Hypertension, essential 2018   • Combined hyperlipidemia 2018   • History of left knee replacement 2018      LOS (days): 5  Geometric Mean LOS (GMLOS) (days): 2.90  Days to GMLOS:-1.8     OBJECTIVE:    Risk of Unplanned Readmission Score: 11.31         Current admission status: Inpatient       Preferred Pharmacy:   [id number] [address]  Phone: [phone] Fax: [phone]    Primary Care Provider: Bhupendra Arellano MD    Primary Insurance: MEDICARE  Secondary Insurance: Scott RENEE    ASSESSMENT:  Active Health Care Proxies     [facility] Representative - Daughter   Primary Phone: [phone] (Mobile)               Advance Directives  Does patient have a [address]?: Yes  Does patient have Advance Directives?: Yes  Advance Directives: Power of  for health care, Living will  Primary Contact: Daughter Mackenzie Garcia    Patient Information  Admitted from[de-identified] Home  Mental Status: Alert  During Assessment patient was accompanied by: Not accompanied during assessment  Assessment information provided by[de-identified] Patient  Primary Caregiver: Self  Support Systems: Daughter, Son, Family members, Yancy Gibbons of Residence: [address] [facility] do you live in?: [address] entry access options.  Select all that apply.: Stairs  Number of steps to enter home.: 4  Type of Current Residence: [place]  In the last 12 months, was there a time when you were not able to pay the mortgage or rent on time?: No  In the last 12 months, how many places have you lived?: 1  In the last 12 months, was there a time when you did not have a steady place to sleep or slept in a shelter (including now)?: No  Homeless/housing insecurity resource given?: N/A  Living Arrangements: Lives Alone  Is patient a ?: No    Activities of Daily Living Prior to Admission  Functional Status: Independent  Completes ADLs independently?: Yes  Ambulates independently?: Yes  Does patient use assisted devices?: Yes  Assisted Devices (DME) used: Darian Aver  Does patient have a history of Outpatient Therapy (PT/OT)?: No  Does the patient have a history of Short-Term Rehab?: No  Does patient have a history of HHC?: No  Does patient currently have [address]?: No    Patient Information Continued  Income Source: Pension/half-way  Does patient have prescription coverage?: Yes  Within the past 12 months, you worried that your food would run out before you got the money to buy more.: Never true  Within the past 12 months, the food you bought just didn't last and you didn't have money to get more.: Never true  Food insecurity resource given?: N/A  Does patient receive dialysis treatments?: No  Does patient have a history of substance abuse?: No  Does patient have a history of Mental Health Diagnosis?: No    Means of Transportation  Means of Transport to Appts[de-identified] Drives Self  In the past 12 months, has lack of transportation kept you from medical appointments or from getting medications?: No  In the past 12 months, has lack of transportation kept you from meetings, work, or from getting things needed for daily living?: No  Was application for public transport provided?: N/A        DISCHARGE DETAILS:    Discharge planning discussed with[de-identified] Patient  Freedom of Choice: Yes  Comments - Freedom of Choice: Discusssed FOC     Other Referral/Resources/Interventions Provided:  Interventions: [facility]  Referral Comments: Therapy rec [facility], patient agreeable to blanket referral to [phone] [facility] agencies in her area, entered in [address]    CM reviewed d/c planning process including the following: identifying help at home, patient preference for d/c planning needs, Discharge Lounge, [facility] Meds to Bed program, availability of treatment team to discuss questions or concerns patient and/or family may have regarding understanding medications and recognizing signs and symptoms once discharged. CM also encouraged patient to follow up with all recommended appointments after discharge. Patient advised of importance for patient and family to participate in managing patient’s medical well being.

## 2023-08-09 NOTE — PLAN OF CARE
Problem: OCCUPATIONAL THERAPY ADULT  Goal: Performs self-care activities at highest level of function for planned discharge setting. See evaluation for individualized goals. Description: Treatment Interventions: ADL retraining, Visual perceptual retraining, Functional transfer training, UE strengthening/ROM, Endurance training, Patient/family training, Equipment evaluation/education, Compensatory technique education, Continued evaluation, Energy conservation, Activityengagement          See flowsheet documentation for full assessment, interventions and recommendations. Note: Limitation: Decreased ADL status, Decreased UE ROM, Decreased UE strength, Decreased Safe judgement during ADL, Decreased endurance, Decreased self-care trans, Decreased high-level ADLs  Prognosis: Good  Assessment: Pt is a 80 y.o. female seen for OT evaluation s/p admit to B on 8/4/2023 w/ Sinus pause. Pt is now s/p PPM placement. Comorbidities affecting pt's functional performance at time of assessment include: PMR (polymyalgia rheumatica) ([address]), s/p Medtronic dual chamber PPM with left bundle pacing lead 8/7/2023, and Stroke [facility]). Personal factors affecting pt at time of IE include:steps to enter environment, limited home support, difficulty performing ADLS, difficulty performing IADLS , limited insight into deficits, decreased initiation and engagement  and health management . Prior to admission, pt was I with ADLS and IADLs. Upon evaluation: Pt presents supine and is agreeable to OTIE, all vitals WNLs. Pt requires overall S- Min A 2* the following deficits impacting occupational performance: weakness, decreased ROM, decreased strength, decreased balance, decreased tolerance and decreased coping skills. Pt resting in chiar at end of session with all needs in reach, alarm on, all lines in place and SCD's on.  Pt to benefit from continued skilled OT tx while in the hospital to address deficits as defined above and maximize level of functional independence w ADL's and functional mobility. Occupational Performance areas to address include: grooming, bathing/shower, toilet hygiene, dressing, health maintenance, functional mobility, community mobility and clothing management. The patient's raw score on the -PAC Daily Activity inpatient short form is 20  , standardized score is 42.03  , greater than 39.4. Patients at this level are likely to benefit from discharge to home. Please refer to the recommendation of the Occupational Therapist for safe discharge planning.      OT Discharge Recommendation: Home with home health rehabilitation

## 2023-08-09 NOTE — OCCUPATIONAL THERAPY NOTE
Occupational Therapy Evaluation     Patient Name: Willian Fountain  DFFVU'N Date: 8/9/2023  Problem List  Principal Problem:    Sinus pause  Active Problems:    Paroxysmal A-fib ([address])    Hypertension, essential    Combined hyperlipidemia    Stage 3a chronic kidney disease (HCC)    Syncope and collapse    Hyponatremia    Sinus pain    Lung nodule seen on imaging study    Multinodular goiter    s/p Medtronic dual chamber PPM with left bundle pacing lead 8/7/2023    Past Medical History  Past Medical History:   Diagnosis Date    PMR (polymyalgia rheumatica) (HCC)     s/p Medtronic dual chamber PPM with left bundle pacing lead 8/7/2023 8/8/2023    Stroke [facility])      Past Surgical History  Past Surgical History:   Procedure Laterality Date    CARDIAC ELECTROPHYSIOLOGY PROCEDURE N/A 8/7/2023    Procedure: Cardiac pacer implant;  Surgeon: Cheyanne Colvin DO;  Location: [facility]; Service: Cardiology    IR AORTAGRAM WITH RUN-OFF  3/9/2021    REPLACEMENT TOTAL KNEE               08/09/23 0931   OT Last Visit   OT Visit Date 08/09/23   Note Type   Note type Evaluation   Pain Assessment   Pain Score 3   Pain Location/Orientation Location: Back   Restrictions/Precautions   Weight Bearing Precautions Per Order No   Other Precautions Pain;Hard of hearing;Telemetry   Home Living   Type of [address] [place] [address] Dr One level;Stairs to enter with rails; Performs ADLs on one level   Bathroom Shower/Tub   (Sponge bathes only)   Bathroom Toilet Raised   Bathroom Equipment Shower chair   Bathroom Accessibility Accessible   Home Equipment Walker;Cane;Stair glide  (Transport wc)   Additional Comments Pt lives in a one level home with 4 CALVIN, pt has FFOS to basement however has stiar glide to this level if needed. Uses RW ocassionally PTA   Prior Function   Level of [place] Independent with ADLs; Independent with functional mobility; Independent with IADLS   Lives With Alone   Receives Help From Family; Neighbor   IADLs Independent with driving; Independent with meal prep; Independent with medication management   Falls in the last 6 months 1 to 4   Vocational Retired   Lifestyle   Autonomy I with ADLS and IADLS +    Reciprocal Relationships supportive neighbor, Supportive DTR who will be staying with her for ~5 days upon d/c however lives about 2 hours away   Service to Others retired   Intrinsic Gratification Gardening   Subjective   Subjective "I like to keep my teeth clean becuase I only have a few left"   ADL   Where [address] [facility]  [address] 4  [address]  [address]  4  Minimal Assistance   Bed Mobility   Supine to Sit 5  Supervision   Additional items HOB elevated; Increased time required   Additional Comments Pt OOB at end of session   Transfers   Sit to Stand 5  Supervision   Stand to Sit 5  Supervision   Stand pivot 4  Minimal assistance   Additional Comments RW   Functional Mobility   Functional Mobility 5  Supervision   Additional Comments Min A progresses to S with RW   Balance   Static Sitting Good   Dynamic Sitting Fair +   Static Standing Fair   Dynamic Standing Fair -   Ambulatory Poor +   Activity Tolerance   Activity Tolerance Patient limited by fatigue   Medical Staff Made Aware DPR, NSG aware   Nurse Made Aware yes, cleared for OTIE   RUE Assessment   RUE Assessment WFL   LUE Assessment   LUE Assessment WFL  (PACER precautions)   Psychosocial   Psychosocial (WDL) WDL   Cognition   Overall Cognitive Status WFL   Arousal/Participation Alert; Responsive; Cooperative   Attention Within functional limits   Orientation Level Oriented X4   Memory Within functional limits   Following Commands Follows one step commands with increased time or repetition   Comments Pt is very [place] but overall very pleasant and cooeprative   Assessment   Limitation Decreased ADL status; Decreased UE ROM; Decreased UE strength;Decreased Safe judgement during ADL;Decreased endurance;Decreased self-care trans;Decreased high-level ADLs   Prognosis Good   Assessment Pt is a 80 y.o. female seen for OT evaluation s/p admit to [facility] on 8/4/2023 w/ Sinus pause. Pt is now s/p PPM placement. Comorbidities affecting pt's functional performance at time of assessment include: PMR (polymyalgia rheumatica) ([address]), s/p Medtronic dual chamber PPM with left bundle pacing lead 8/7/2023, and Stroke [facility]). Personal factors affecting pt at time of IE include:steps to enter environment, limited home support, difficulty performing ADLS, difficulty performing IADLS , limited insight into deficits, decreased initiation and engagement  and health management . Prior to admission, pt was I with ADLS and IADLs. Upon evaluation: Pt presents supine and is agreeable to OTIE, all vitals WNLs. Pt requires overall S- Min A 2* the following deficits impacting occupational performance: weakness, decreased ROM, decreased strength, decreased balance, decreased tolerance and decreased coping skills. Pt resting in chiar at end of session with all needs in reach, alarm on, all lines in place and SCD's on. Pt to benefit from continued skilled OT tx while in the hospital to address deficits as defined above and maximize level of functional independence w ADL's and functional mobility. Occupational Performance areas to address include: grooming, bathing/shower, toilet hygiene, dressing, health maintenance, functional mobility, community mobility and clothing management. The patient's raw score on the AM-PAC Daily Activity inpatient short form is 20  , standardized score is 42.03  , greater than 39.4. Patients at this level are likely to benefit from discharge to home.  Please refer to the recommendation of the Occupational Therapist for safe discharge planning. Goals   Patient Goals to go home   LTG Time Frame 10-14   Long Term Goal #1 see below   Plan   Treatment Interventions ADL retraining;Visual perceptual retraining;Functional transfer training;UE strengthening/ROM; Endurance training;Patient/family training;Equipment evaluation/education; Compensatory technique education;Continued evaluation; Energy conservation; Activityengagement   Goal Expiration Date 08/23/23   OT Treatment Day 1   OT Frequency 2-3x/wk   Recommendation   OT Discharge Recommendation Home with home health rehabilitation   AM-PAC Daily Activity Inpatient   Lower Body Dressing 3   Bathing 3   Toileting 3   Upper Body Dressing 3   Grooming 4   Eating 4   Daily Activity Raw Score 20   Daily Activity Standardized Score (Calc for Raw Score >=11) 42.03   AM-PAC Applied Cognition Inpatient   Following a Speech/Presentation 4   Understanding Ordinary Conversation 4   Taking Medications 4   Remembering Where Things Are Placed or Put Away 4   Remembering List of 4-5 Errands 4   Taking Care of Complicated Tasks 3   Applied Cognition Raw Score 23   Applied Cognition Standardized Score 53.08     OT goals to be addressed in the next 14 days:    1) Pt will increase activity tolerance to G for 30 min txment sessions  2) Pt will complete UB/LB dressing/self care w/ mod I using adaptive device and DME as needed  3) Pt will complete bathing w/ Mod I w/ use of AE and DME as needed  4) Pt will complete toileting w/ mod I w/ G hygiene/thoroughness using DME as needed  5) Pt will improve functional transfers to Mod I on/off all surfaces using DME as needed w/ G balance/safety   6) Pt will improve functional mobility during ADL/IADL/leisure tasks to Mod I using DME as needed w/ G balance/safety   7) Pt will participate in simulated IADL management task with DME as needed to increase independence to  w/ G safety and endurance  8) Pt will demonstrate G carryover of pt/caregiver education and training as appropriate. 9) Pt will demonstrate 100% carryover of energy conservation techniques t/o functional I/ADL/leisure tasks w/o cues s/p skilled education  10) Pt will independently identify and utilize 2-3 coping strategies to increase positive affect and promote overall well-being. Additional OT tx session [id number]:    Pt was seen for additional OT tx session focusing on one handed  dressing techniques, pt was given hand out on same continues to require min A, would benefit from continued OT Tx to addressed above POC.

## 2023-08-09 NOTE — PROGRESS NOTES
INTERNAL MEDICINE RESIDENCY PROGRESS NOTE     Name: Lizet Naranjo   Age & Sex: 80 y.o. female   MRN: [id number]  Unit/Bed#: -01   Encounter: [id number]  Team: SOD Team C     PATIENT INFORMATION     Name: Lizet Naranjo   Age & Sex: 80 y.o. female   MRN: [id number]  Hospital Stay Days: 5    ASSESSMENT/PLAN     Principal Problem:    Sinus pause  Active Problems:    Syncope and collapse    Paroxysmal A-fib (HCC)    Hypertension, essential    Combined hyperlipidemia    Stage 3a chronic kidney disease (HCC)    Hyponatremia    Sinus pain    Lung nodule seen on imaging study    Multinodular goiter    s/p Medtronic dual chamber PPM with left bundle pacing lead 8/7/2023      * Sinus pause  Assessment & Plan  Pt  presented to [id number] [place] [address] with syncopal event. Pt reports feeling "funny" and lightheaded and fell on her knees and hit her head on the wall. Pt states that she lost consciousness for under 30 seconds and called her neighbor and they brought her to [address] [place] [address] ED. CT head unremarkable, showed mild midline scalp swelling. Does complain of some lower back pain s/p fall. CT C and T spine negative. On tele was found to have sinus pauses and bradycardia in the 40s. She was transferred to [facility] for EP evaluation. TTE done today at [address] [place] [address] showed EF of 65%. K at 3.2 and Mg at 1.9 on 08/05. Plan:  · S/P Medtronic dual chamber PPM with left bundle pacing lead  · Restarted home metoprolol succinate 25 mg daily  · On eliquis 5 mg  · Lyme disease pending  · Stable for discharge    Syncope and collapse  Assessment & Plan  See Assessment for "Sinus pause"    Plan:  · Lidocaine patch for back pain  · Tylenol prn  · Ice packs for back pain at pt request     Paroxysmal A-fib [facility])  Assessment & Plan  Rate controlled. Home eliquis.     Plan:  · Was initially holding home eliquis for possible pacemaker placement today, but procedure will take place Monday, per EP  · Restart home eliquis, will hold nighttime dose tomorrow and morning dose on the day of procedure (held on night of 8/6 and AM 8/7)  · Back on eliquis today  · Hold home metoprolol due to bradycardia/sinus pauses  · Back on metoprolol today (8/8/23). Hypertension, essential  Assessment & Plan  Home meds include, lisinopril/HCTZ and metoprolol. Plan:  · Was on lisinopril 5 mg at [facility]  · Increased to home lisinopril 10 mg   · Became hypertensive to SBPs > 180  · Was given one-time dose of hydralazine with improvement in BP  · BP has been stable since  · Continue BP monitoring    Combined hyperlipidemia  Assessment & Plan  - Continue home atorvastatin 20 mg daily    Stage 3a chronic kidney disease [facility])  Assessment & Plan  Lab Results   Component Value Date    EGFR 55 08/04/2023    EGFR 47 08/03/2023    EGFR 47 03/23/2021    CREATININE 0.93 08/04/2023    CREATININE 1.05 08/03/2023    CREATININE 1.07 03/23/2021     -Avoid nephrotoxic agents    Hyponatremia  Assessment & Plan  Sodium on admission at [facility] was 128 and improved to 129, now at 132 and stable on 8/7 also at 132, improved to 134 8/8, likely 2/2 dehydration. Continues to be hyponatremic to 131 (8/9), pt likely at baseline, appears to be hydrating well. Plan:  · CMP, monitor sodium  · Maintenance fluids  · Will continue to encourage adequate hydration on discharge    Lung nodule seen on imaging study  Assessment & Plan  CT Chest 8/3/2023 showed. "No acute posttraumatic abnormality.     1.1 cm left lower lobe nodule. No prior studies. Based on current Fleischner Society 2017 Guidelines on incidental pulmonary nodule, either PET/CT scan evaluation, tissue sampling or short term interval followup non-contrast CT followup (initially in 3   months) may be considered appropriate.     Multinodular goiter. Thyroid ultrasound recommended. - Found incidentally on imaging.  Follow-up in outpatient setting upon DC    Sinus pain  Assessment & Plan  Pt saw her pcp on 8/1 for sinus congestion/pain. Her PCP started her on amoxicillin which she took for 3 days. At [facility] she was transitioned to doxycycline (3 days). - Dc'ed doxycycline on - no further indication for treatment. Multinodular goiter  Assessment & Plan  CT Chest 8/3/2023 showed multinodular goiter of thyroid. - TSH normal from 8/3/2023  - Thyroid U/S in outpatient setting upon DC        Disposition: medically stable for discharge. SUBJECTIVE     Patient seen and examined. No acute events overnight. Pt reports she is doing well this morning and has not had any further issues with feeling light-headed. She reports her back pain that has been present since her admission has improved. Otherwise denied any other complaints. OBJECTIVE     Vitals:    23 0257 23 0715 23 1109 23 1535   BP:  130/70 101/55 121/77   BP Location:  Right arm Right arm    Pulse: 63 65 75 68   Resp: 18 18 18 18   Temp: 98 °F (36.7 °C) 97.9 °F (36.6 °C) 98 °F (36.7 °C) 97.7 °F (36.5 °C)   TempSrc:  Oral Oral    SpO2: 98% 95% 95% 96%   Weight:       Height:          Temperature:   Temp (24hrs), Av.9 °F (36.6 °C), Min:97.7 °F (36.5 °C), Max:98 °F (36.7 °C)    Temperature: 97.7 °F (36.5 °C)  Intake & Output:  I/O        0701   0700  0701   0700  0701  08/10 0700    P. O. 0 1340 900    I.V. (mL/kg) 150 (1.9)      Total Intake(mL/kg) 150 (1.9) 1340 (16.6) 900 (11.2)    Urine (mL/kg/hr)  350 (0.2)     Total Output  350     Net +150 +990 +900           Unmeasured Urine Occurrence  3 x         Weights:   IBW (Ideal Body Weight): 52.4 kg    Body mass index is 31.52 kg/m². Weight (last 2 days)     None        Physical Exam  Vitals and nursing note reviewed. Constitutional:       Appearance: She is obese. HENT:      Head: Normocephalic. Comments: Abrasion on midline of skull (sagital) healing well. Nose: No congestion or rhinorrhea.    Eyes:      Pupils: Pupils are equal, round, and reactive to light. Cardiovascular:      Rate and Rhythm: Normal rate. Rhythm irregular. Pulmonary:      Effort: Pulmonary effort is normal.      Breath sounds: Normal breath sounds. Abdominal:      Palpations: Abdomen is soft. Tenderness: There is no abdominal tenderness. Musculoskeletal:         General: Tenderness present. No swelling. Cervical back: Neck supple. Right lower leg: No edema. Left lower leg: No edema. Comments: Back from fall (muscle sprain). Skin:     General: Skin is warm. Findings: Bruising present. Comments: All over arms, healing. Neurological:      Mental Status: She is alert and oriented to person, place, and time. Psychiatric:         Mood and Affect: Mood normal.       LABORATORY DATA     Labs: I have personally reviewed pertinent reports.   Results from last 7 days   Lab Units 08/09/23 0517 08/08/23 0445 08/07/23  0441   WBC Thousand/uL 7.96 9.40 8.46   HEMOGLOBIN g/dL 14.3 15.4 15.3   HEMATOCRIT % 42.9 46.2* 46.1   PLATELETS Thousands/uL 201 216 208   NEUTROS PCT % 63 73 65   MONOS PCT % 12 11 13*   EOS PCT % 3 2 3      Results from last 7 days   Lab Units 08/09/23  0517 08/08/23 0445 08/07/23  0441 08/06/23  0438 08/05/23  0503   POTASSIUM mmol/L 4.4 4.3 4.2 4.2 3.2*   CHLORIDE mmol/L 102 101 102 100 99   CO2 mmol/L 25 24 25 28 25   BUN mg/dL 21 17 16 10 10   CREATININE mg/dL 0.97 1.02 1.04 0.94 0.86   CALCIUM mg/dL 8.7 8.7 8.9 9.1 8.5   ALK PHOS U/L  --   --  56 54 50   ALT U/L  --   --  18 17 19   AST U/L  --   --  23 18 21     Results from last 7 days   Lab Units 08/07/23 0441 08/06/23  0438 08/05/23  0503   MAGNESIUM mg/dL 2.2 2.2 1.9          Results from last 7 days   Lab Units 08/07/23  0441 08/05/23  0503 08/04/23  2300 08/04/23  1649 08/04/23  1035   INR  1.12  --  1.19  --  1.21*   PTT seconds  --  74* 77* 73* 35               IMAGING & DIAGNOSTIC TESTING     Radiology Results: I have personally reviewed pertinent reports. XR chest 2 views    Result Date: 8/8/2023  Impression: Pacemaker leads appear to be in proper position. No acute abnormality in the chest. Workstation performed: [id number]     XR chest portable    Result Date: 8/8/2023  Impression: No acute cardiopulmonary disease. Left-sided permanent pacemaker. Workstation performed: [id number]     Other Diagnostic Testing: I have personally reviewed pertinent reports. ACTIVE MEDICATIONS     Current Facility-Administered Medications   Medication Dose Route Frequency   • acetaminophen (TYLENOL) tablet 650 mg  650 mg Oral Q6H PRN   • apixaban (ELIQUIS) tablet 5 mg  5 mg Oral BID   • aspirin (ECOTRIN LOW STRENGTH) EC tablet 81 mg  81 mg Oral Daily   • atorvastatin (LIPITOR) tablet 20 mg  20 mg Oral Daily   • fluticasone (FLONASE) 50 mcg/act nasal spray 1 spray  1 spray Each Nare BID   • gabapentin (NEURONTIN) capsule 300 mg  300 mg Oral HS   • lidocaine (LIDODERM) 5 % patch 1 patch  1 patch Topical Daily   • lisinopril (ZESTRIL) tablet 10 mg  10 mg Oral Daily   • melatonin tablet 6 mg  6 mg Oral HS   • metoprolol succinate (TOPROL-XL) 24 hr tablet 25 mg  25 mg Oral BID   • polyethylene glycol (MIRALAX) packet 17 g  17 g Oral Daily   • senna-docusate sodium (SENOKOT S) 8.6-50 mg per tablet 1 tablet  1 tablet Oral BID       VTE Pharmacologic Prophylaxis: On eliquis   VTE Mechanical Prophylaxis: sequential compression device    Portions of the record may have been created with voice recognition software. Occasional wrong word or "sound a like" substitutions may have occurred due to the inherent limitations of voice recognition software.   Read the chart carefully and recognize, using context, where substitutions have occurred.  ==  Mayco Parker MD  [facility]  Internal Medicine Residency PGY-1

## 2023-08-10 VITALS
SYSTOLIC BLOOD PRESSURE: 122 MMHG | TEMPERATURE: 97.5 F | HEART RATE: 61 BPM | BODY MASS INDEX: 31.52 KG/M2 | OXYGEN SATURATION: 95 % | RESPIRATION RATE: 17 BRPM | DIASTOLIC BLOOD PRESSURE: 44 MMHG | HEIGHT: 63 IN | WEIGHT: 177.91 LBS

## 2023-08-10 PROCEDURE — 99238 HOSP IP/OBS DSCHRG MGMT 30/<: CPT | Performed by: INTERNAL MEDICINE

## 2023-08-10 RX ORDER — ACETAMINOPHEN 325 MG/1
650 TABLET ORAL 2 TIMES DAILY
Qty: 120 TABLET | Refills: 0 | Status: SHIPPED | OUTPATIENT
Start: 2023-08-10 | End: 2023-09-09

## 2023-08-10 RX ADMIN — FLUTICASONE PROPIONATE 1 SPRAY: 50 SPRAY, METERED NASAL at 09:09

## 2023-08-10 RX ADMIN — ASPIRIN 81 MG: 81 TABLET, COATED ORAL at 09:09

## 2023-08-10 RX ADMIN — METOPROLOL SUCCINATE 25 MG: 25 TABLET, FILM COATED, EXTENDED RELEASE ORAL at 09:11

## 2023-08-10 RX ADMIN — ATORVASTATIN CALCIUM 20 MG: 20 TABLET, FILM COATED ORAL at 09:09

## 2023-08-10 RX ADMIN — APIXABAN 5 MG: 5 TABLET, FILM COATED ORAL at 09:09

## 2023-08-10 RX ADMIN — LISINOPRIL 10 MG: 10 TABLET ORAL at 09:09

## 2023-08-10 NOTE — NURSING NOTE
Patient is A&OX 4, discharge instruction was provided to pt and daughter, both of them verbalized understanding.

## 2023-08-11 LAB — MISCELLANEOUS LAB TEST RESULT: NORMAL

## 2023-08-23 ENCOUNTER — IN-CLINIC DEVICE VISIT (OUTPATIENT)
Dept: CARDIOLOGY CLINIC | Facility: CLINIC | Age: 88
End: 2023-08-23

## 2023-08-23 DIAGNOSIS — Z95.0 PRESENCE OF PERMANENT CARDIAC PACEMAKER: Primary | ICD-10-CM

## 2023-08-23 PROCEDURE — 99024 POSTOP FOLLOW-UP VISIT: CPT | Performed by: INTERNAL MEDICINE

## 2023-08-23 NOTE — PROGRESS NOTES
MDT DUAL CHAMBER PM - ACTIVE SYSTEM IS MRI CONDITIONAL   DEVICE INTERROGATED IN THE [place] OFFICE:  BATTERY VOLTAGE ADEQUATE (14.1 YR.).  AP <0.1% -LBB 45.4% (MODE SWITCHED/MVP ON).  ALL AVAILABLE LEAD PARAMETERS WITHIN NORMAL LIMITS.  1 FAST A&V EPISODE SHOWING AF WITH RVR @ ~ 150 BPM.  100% AF, ON ASA, ELIQUIS, AND METOPROLOL.  NO PROGRAMMING CHANGES MADE TO DEVICE PARAMETERS.  WOUND CHECK: INCISION CLEAN AND DRY WITH EDGES APPROXIMATED; STAPLES REMOVED; WOUND CARE AND RESTRICTIONS REVIEWED WITH PATIENT.  NORMAL DEVICE FUNCTION.  RG

## 2023-10-16 NOTE — PROGRESS NOTES
Assessment/Plan:    Atherosclerosis of artery of both lower extremities (HCC)  Hx R SFA and popliteal artery angioplasty and stent (3/29/2021) in setting for R foot tissue loss  -     VAS lower limb arterial duplex, complete bilateral; Future  -B L>R chronic neuropathy which is unchanged  -Legs get tired for which she rests; no ischemic rest pain or tissue loss  -No new foot symptoms    -DANTE 5/24/23    R NC/63/46; 50-75% stenosis prox/mid SFA and stent; widely patent popliteal artery and stent    L NC/24/--; Occlusion of the prox SFA with recon of the distal SFA. 50-75% dSFA / prox popliteal artery. AT artery HG v occlusion    DANTE 5/27/22    R NC/52/48, patent SFA and popl stents, 50-75% prox SFA. L NC/34/9    No progression of disease     Plan:  -88yoF with severe B PAD with bilateral SFA disease  -No ischemic rest pain or tissue los so we can continue to monitor  -Continue with aspirin 81, apixaban 5 twice daily (Parox AF) and atorvastatin 20  -Avoid foot wounds; check feet daily and call office if development of foot pain or wounds that do not heal right away  -Follow up DANTE in 1 year (May '23) with office visit    Additional diagnoses:  Chronic anticoagulation  Combined hyperlipidemia  Hypertension, essential  s/p Medtronic dual chamber PPM with left bundle pacing lead 8/7/2023  Stage 3a chronic kidney disease ([address])  Paroxysmal A-fib ([address])  on anticoagulation      Subjective:      Patient ID: Sherron Luna is a 80 y.o. female. Patient presents today to review DANTE done 5/24/23. H/o R SFA popliteal artery angioplasty and stent 3/29/21. HPI   Ms. Sherron Luna 88 y/oF L TKR ('17), lumbar disc disease/spinal fusion ('14) Htn, HLD, paroxAF on apixaban, CKD 3a, Hx PMR, "mini-stroke", PAD s/p right LE angioplasty and stenting. She underwent right lower extremity procedures in March of 2021 for rest pain and great toe wound which was able to heal after revascularization.      10/16/2023:   Patient presents for her annual office visit and review of lower extremity arterial duplex study. From vascular standpoint, she has no new leg or foot symptoms. She has longstanding neuropathy in the feet but essentially unchanged. She takes gabapentin especially at night. She continues to stay active and does a lot of her own work around the house work around the house. With ADLs, she reports that the legs do get tired so she sits and rests until she can get up and work again. She does have a cleaning lady to help her out. She has no new leg symptoms, ischemic rest pain or tissue loss. She does walk around the house barefoot which I advised against given her underlying artery disease. We discussed the importance of monitoring her feet for any color or temperature changes or wounds that do not heal for which she should call the office right away. Since last year, she did have a syncopal episode due to 8-second pauses and required pacemaker. She feels much better and safer now that she has the pacer. She remains on apixaban 5 twice daily, aspirin and atorvastatin 20. We reviewed her recent testing. The following portions of the patient's history were reviewed and updated as appropriate: allergies, current medications, past family history, past medical history, past social history, past surgical history, and problem list.    Review of Systems   Constitutional: Negative. HENT:  Positive for hearing loss. Eyes: Negative. Respiratory: Negative. Cardiovascular: Negative. Gastrointestinal: Negative. Endocrine: Negative. Genitourinary: Negative. Musculoskeletal: Negative. Skin: Negative. Allergic/Immunologic: Negative. Neurological: Negative. Hematological: Negative. Psychiatric/Behavioral: Negative.            Objective:    /78 (BP Location: Left arm, Patient Position: Sitting)   Pulse 82   Ht 5' 3" (1.6 m)   Wt 78.9 kg (174 lb)   BMI 30.82 kg/m²       Feet warm and well-perfused. Normal color and temperature. Cap refill R <2 seconds; L 2-3 seconds  No wounds     Physical Exam  Vitals and nursing note reviewed. Constitutional:       Appearance: She is well-developed. HENT:      Head: Normocephalic and atraumatic. Eyes:      Pupils: Pupils are equal, round, and reactive to light. Neck:      Thyroid: No thyromegaly. Vascular: No carotid bruit or JVD. Trachea: Trachea normal.   Cardiovascular:      Rate and Rhythm: Normal rate and regular rhythm. Pulses:           Carotid pulses are 2+ on the right side and 2+ on the left side. Radial pulses are 2+ on the right side and 2+ on the left side. Dorsalis pedis pulses are 2+ on the right side and 0 on the left side. Posterior tibial pulses are 0 on the right side and 0 on the left side. Heart sounds: Normal heart sounds, S1 normal and S2 normal. No murmur heard. No friction rub. No gallop. Pulmonary:      Effort: Pulmonary effort is normal. No accessory muscle usage or respiratory distress. Breath sounds: Normal breath sounds. No wheezing or rales. Abdominal:      General: Bowel sounds are normal. There is no distension. Palpations: Abdomen is soft. Tenderness: There is no abdominal tenderness. Musculoskeletal:         General: No deformity. Normal range of motion. Cervical back: Neck supple. Right lower leg: No edema. Left lower leg: No edema. Skin:     General: Skin is warm and dry. Findings: No lesion or rash. Nails: There is no clubbing. Neurological:      Mental Status: She is alert and oriented to person, place, and time. Comments: Grossly normal    Psychiatric:         Behavior: Behavior is cooperative. DANTE 5/24/23  [facility] REPORT  CLINICAL:  Indications:  Routine surveillance for progression of disease.   The patient states she has  numbness/tingling in her feet and intermittent cramps in her left leg. Operative History:  2021-03-29 Right SFA and popliteal artery angioplasty and stent  Risk Factors  The patient has history of HTN, Hyperlipidemia, CKD and PAD. Clinical  Right Pressure:  109/ mm Hg, Left Pressure:  100/ mm Hg. FINDINGS:     Segment                Right                   Left                                            Impression  PSV (cm/s)  Impression  PSV (cm/s)    Post. Tibial           NC                      NC                        Common Femoral Artery                      92                      86    Prox Profunda                             148                      81    Prox SFA                                       Occluded            19    Prox SFA - Stent                          130                            Mid SFA                                        Occluded             0    Mid SFA - Stent        50-75%             301                            Dist SFA                                                           81    Dist SFA - Stent                          151                            Proximal Pop                               81  50-75%             281    Distal Pop                                                         24    Distal Pop - Stent                         46                            Tibioperoneal                              69                      26    Dist Post Tibial                          116                      32    Dist. Ant. Tibial                         173  Occluded                  Dist Peroneal                              29                      21             CONCLUSION:  Impression:  RIGHT LOWER LIMB:  There is 50-75% stenosis noted in the proximal/mid superficial femoral artery  and stent. Widely patent popliteal artery and stent. Ankle/Brachial index: Non-compressible, (Prior: Non-compressible). Metatarsal pressure of 63 mm Hg, (Prior: 52 mm Hg).   Great toe pressure of 46 mm Hg, within the healing range, (Prior: 48 mm Hg). PVR/ PPG tracings are mildly dampened. LEFT LOWER LIMB:  Occlusion of the proximal superficial femoral artery with reconstitution of the  distal superficial femoral artery. There is a 50-75% stenosis of the distal superficial femoral artery/proximal  popliteal artery. The anterior tibial artery appears occluded vs high grade stenosis. Ankle/Brachial index: Non-compressible, (Prior: Non-compressible). Metatarsal pressure of 24 mm Hg, (Prior: 34 mm Hg). Great toe pressure was unobtainable due to severely dampened PPG tracing. (Prior: 9 mm Hg). PVR/ PPG tracings are dampened. In comparison to the study on 5/27/2022, the left great toe pressure was  unobtainable. Recommend repeat duplex in 1 year to monitor for plaque  progression. I have reviewed and made appropriate changes to the review of systems input by the medical assistant. Vitals:    10/17/23 1033   BP: 134/78   BP Location: Left arm   Patient Position: Sitting   Pulse: 82   Weight: 78.9 kg (174 lb)   Height: 5' 3" (1.6 m)       Patient Active Problem List   Diagnosis    Paroxysmal A-fib (HCC)    Chronic anticoagulation    Hypertension, essential    Combined hyperlipidemia    History of left knee replacement    Atherosclerosis of artery of both lower extremities (HCC)    Stage 3a chronic kidney disease (HCC)    Syncope and collapse    Hyponatremia    Sinus pain    Pre-syncope    Sinus pause    Lung nodule seen on imaging study    Multinodular goiter    s/p Medtronic dual chamber PPM with left bundle pacing lead 8/7/2023       Past Surgical History:   Procedure Laterality Date    CARDIAC ELECTROPHYSIOLOGY PROCEDURE N/A 8/7/2023    Procedure: Cardiac pacer implant;  Surgeon: Robert Peñaloza DO;  Location: [facility]; Service: Cardiology    IR AORTAGRAM WITH RUN-OFF  3/9/2021    REPLACEMENT TOTAL KNEE         History reviewed. No pertinent family history.     Social History     Socioeconomic History    Marital status: /Civil Union     Spouse name: Not on file    Number of children: Not on file    Years of education: Not on file    Highest education level: Not on file   Occupational History    Not on file   Tobacco Use    Smoking status: Never     Passive exposure: Never    Smokeless tobacco: Never   Vaping Use    Vaping Use: Never used   Substance and Sexual Activity    Alcohol use: No    Drug use: No    Sexual activity: Not on file   Other Topics Concern    Not on file   Social History Narrative    Not on file     Social Determinants of Health     Financial Resource Strain: Not on file   Food Insecurity: No Food Insecurity (8/9/2023)    Hunger Vital Sign     Worried About Running Out of Food in the Last Year: Never true     Ran Out of Food in the Last Year: Never true   Transportation Needs: No Transportation Needs (8/9/2023)    PRAPARE - Transportation     Lack of Transportation (Medical): No     Lack of Transportation (Non-Medical):  No   Physical Activity: Not on file   Stress: Not on file   Social Connections: Not on file   Intimate Partner Violence: Not on file   Housing Stability: Low Risk  (8/9/2023)    Housing Stability Vital Sign     Unable to Pay for Housing in the Last Year: No     Number of Places Lived in the Last Year: 1     Unstable Housing in the Last Year: No       Allergies   Allergen Reactions    Hydrocodone     Oxycodone Anxiety and Headache         Current Outpatient Medications:     aspirin (ECOTRIN LOW STRENGTH) 81 mg EC tablet, Take 81 mg by mouth daily, Disp: , Rfl:     atorvastatin (LIPITOR) 20 mg tablet, Take 1 tablet by mouth daily, Disp: , Rfl:     cholecalciferol (VITAMIN D3) 400 units tablet, Take 400 Units by mouth daily, Disp: , Rfl:     ELIQUIS 5 MG, Take 5 mg by mouth 2 (two) times a day, Disp: , Rfl: 3    fluticasone (FLONASE) 50 mcg/act nasal spray, SPRAY 2 SPRAYS INTO EACH NOSTRIL EVERY DAY, Disp: , Rfl:     gabapentin (NEURONTIN) 300 mg capsule, Take 300 mg by mouth daily at bedtime, Disp: , Rfl:     lisinopril-hydrochlorothiazide (PRINZIDE,ZESTORETIC) 20-12.5 MG per tablet, Take 0.5 tablets by mouth daily, Disp: , Rfl:     loratadine (CLARITIN) 10 mg tablet, Take 10 mg by mouth daily, Disp: , Rfl: 5    metoprolol succinate (TOPROL-XL) 25 mg 24 hr tablet, Take 1 tablet (25 mg total) by mouth daily Take 1 tablet daily, Disp: , Rfl:     predniSONE 10 mg tablet, Take 5 mg by mouth daily , Disp: , Rfl:     Sennosides (LAXATIVE PILLS PO), Take 1 tablet by mouth Twice daily, Disp: , Rfl:     Sodium Fluoride 5000 PPM 1.1 % GEL, BRUSH TWICE DAILY, Disp: , Rfl:

## 2023-10-17 ENCOUNTER — OFFICE VISIT (OUTPATIENT)
Dept: VASCULAR SURGERY | Facility: CLINIC | Age: 88
End: 2023-10-17
Payer: MEDICARE

## 2023-10-17 VITALS
DIASTOLIC BLOOD PRESSURE: 78 MMHG | WEIGHT: 174 LBS | BODY MASS INDEX: 30.83 KG/M2 | SYSTOLIC BLOOD PRESSURE: 134 MMHG | HEIGHT: 63 IN | HEART RATE: 82 BPM

## 2023-10-17 DIAGNOSIS — E78.2 COMBINED HYPERLIPIDEMIA: ICD-10-CM

## 2023-10-17 DIAGNOSIS — I70.203 ATHEROSCLEROSIS OF ARTERY OF BOTH LOWER EXTREMITIES (HCC): Primary | ICD-10-CM

## 2023-10-17 DIAGNOSIS — Z95.0 PACEMAKER: ICD-10-CM

## 2023-10-17 DIAGNOSIS — I10 HYPERTENSION, ESSENTIAL: ICD-10-CM

## 2023-10-17 DIAGNOSIS — I48.0 PAROXYSMAL A-FIB (HCC): ICD-10-CM

## 2023-10-17 DIAGNOSIS — N18.31 STAGE 3A CHRONIC KIDNEY DISEASE (HCC): ICD-10-CM

## 2023-10-17 DIAGNOSIS — Z79.01 CHRONIC ANTICOAGULATION: ICD-10-CM

## 2023-10-17 PROCEDURE — 99213 OFFICE O/P EST LOW 20 MIN: CPT | Performed by: PHYSICIAN ASSISTANT

## 2023-10-17 NOTE — PATIENT INSTRUCTIONS
PAD  -Continue with aspirin 81, apixaban 5 twice daily (Parox AF) and atorvastatin 20  -Avoid foot wounds; check feet daily and call office if development of foot pain or wounds that do not heal right away

## 2023-11-30 ENCOUNTER — IN-CLINIC DEVICE VISIT (OUTPATIENT)
Dept: CARDIOLOGY CLINIC | Facility: CLINIC | Age: 88
End: 2023-11-30
Payer: MEDICARE

## 2023-11-30 DIAGNOSIS — Z95.0 PRESENCE OF PERMANENT CARDIAC PACEMAKER: Primary | ICD-10-CM

## 2023-11-30 PROCEDURE — 93280 PM DEVICE PROGR EVAL DUAL: CPT | Performed by: INTERNAL MEDICINE

## 2023-11-30 NOTE — PROGRESS NOTES
MDT DUAL CHAMBER PM - ACTIVE SYSTEM IS MRI CONDITIONAL   DEVICE INTERROGATED IN THE [place] OFFICE:  BATTERY VOLTAGE ADEQUATE (14.1 YR.). AP <0.1% -LBB 48.7% (>40%/MODE SWITCHED). ALL AVAILABLE LEAD PARAMETERS WITHIN NORMAL LIMITS. 29 FAST A&V EPISODES SINCE 8/23/2023;  AF WITH RVR ON EGMS, UP  BPM AND LONGEST EPISODE 55 S. PATIENT TAKES ELIQUIS, METOPROLOL, AND ASA. NO PROGRAMMING CHANGES MADE TO DEVICE PARAMETERS. NORMAL DEVICE FUNCTION.   RG

## 2024-03-05 ENCOUNTER — REMOTE DEVICE CLINIC VISIT (OUTPATIENT)
Dept: CARDIOLOGY CLINIC | Facility: CLINIC | Age: 89
End: 2024-03-05
Payer: MEDICARE

## 2024-03-05 DIAGNOSIS — Z95.0 PRESENCE OF PERMANENT CARDIAC PACEMAKER: Primary | ICD-10-CM

## 2024-03-05 PROCEDURE — 93294 REM INTERROG EVL PM/LDLS PM: CPT | Performed by: INTERNAL MEDICINE

## 2024-03-05 PROCEDURE — 93296 REM INTERROG EVL PM/IDS: CPT | Performed by: INTERNAL MEDICINE

## 2024-03-05 NOTE — PROGRESS NOTES
MDT DC PM - ACTIVE SYSTEM IS MRI CONDITIONAL   CARELINK TRANSMISSION:  BATTERY VOLTAGE ADEQUATE (13.9 YR.).  AP <0.1% -LBB 43.7% (>40%/MODE SWITCHED).  ALL AVAILABLE LEAD PARAMETERS WITHIN NORMAL LIMITS.  57 SVT-AF EPISODES WITH RVR UP  BPM.  100% AF, ON ASA, ELIQUIS, AND METOPROLOL.  NORMAL DEVICE FUNCTION.  RG

## 2024-05-28 ENCOUNTER — HOSPITAL ENCOUNTER (OUTPATIENT)
Dept: NON INVASIVE DIAGNOSTICS | Facility: CLINIC | Age: 89
Discharge: HOME | End: 2024-05-28
Payer: MEDICARE

## 2024-05-28 DIAGNOSIS — I70.203 ATHEROSCLEROSIS OF ARTERY OF BOTH LOWER EXTREMITIES (HCC): ICD-10-CM

## 2024-05-28 PROCEDURE — 93925 LOWER EXTREMITY STUDY: CPT | Performed by: SURGERY

## 2024-05-28 PROCEDURE — 93923 UPR/LXTR ART STDY 3+ LVLS: CPT | Performed by: SURGERY

## 2024-05-28 PROCEDURE — 93923 UPR/LXTR ART STDY 3+ LVLS: CPT

## 2024-05-28 PROCEDURE — 93925 LOWER EXTREMITY STUDY: CPT

## 2024-06-04 ENCOUNTER — APPOINTMENT (EMERGENCY)
Dept: RADIOLOGY | Facility: HOSPITAL | Age: 89
End: 2024-06-04
Payer: MEDICARE

## 2024-06-04 ENCOUNTER — REMOTE DEVICE CLINIC VISIT (OUTPATIENT)
Dept: CARDIOLOGY CLINIC | Facility: CLINIC | Age: 89
End: 2024-06-04
Payer: MEDICARE

## 2024-06-04 ENCOUNTER — HOSPITAL ENCOUNTER (EMERGENCY)
Facility: HOSPITAL | Age: 89
Discharge: HOME | End: 2024-06-04
Attending: STUDENT IN AN ORGANIZED HEALTH CARE EDUCATION/TRAINING PROGRAM
Payer: MEDICARE

## 2024-06-04 VITALS
OXYGEN SATURATION: 98 % | HEART RATE: 92 BPM | SYSTOLIC BLOOD PRESSURE: 158 MMHG | RESPIRATION RATE: 16 BRPM | DIASTOLIC BLOOD PRESSURE: 80 MMHG | TEMPERATURE: 97.6 F

## 2024-06-04 DIAGNOSIS — R53.1 WEAKNESS: Primary | ICD-10-CM

## 2024-06-04 DIAGNOSIS — Z95.0 CARDIAC PACEMAKER IN SITU: Primary | ICD-10-CM

## 2024-06-04 LAB
2HR DELTA HS TROPONIN: 0 NG/L
ANION GAP SERPL CALCULATED.3IONS-SCNC: 8 MMOL/L (ref 4–13)
BASOPHILS # BLD AUTO: 0.03 THOUSANDS/ÂΜL (ref 0–0.1)
BASOPHILS NFR BLD AUTO: 0 % (ref 0–1)
BUN SERPL-MCNC: 15 MG/DL (ref 5–25)
CALCIUM SERPL-MCNC: 9.4 MG/DL (ref 8.4–10.2)
CARDIAC TROPONIN I PNL SERPL HS: 13 NG/L
CARDIAC TROPONIN I PNL SERPL HS: 13 NG/L
CHLORIDE SERPL-SCNC: 99 MMOL/L (ref 96–108)
CO2 SERPL-SCNC: 26 MMOL/L (ref 21–32)
CREAT SERPL-MCNC: 0.82 MG/DL (ref 0.6–1.3)
EOSINOPHIL # BLD AUTO: 0 THOUSAND/ÂΜL (ref 0–0.61)
EOSINOPHIL NFR BLD AUTO: 0 % (ref 0–6)
ERYTHROCYTE [DISTWIDTH] IN BLOOD BY AUTOMATED COUNT: 14 % (ref 11.6–15.1)
GFR SERPL CREATININE-BSD FRML MDRD: 64 ML/MIN/1.73SQ M
GLUCOSE SERPL-MCNC: 118 MG/DL (ref 65–140)
HCT VFR BLD AUTO: 40 % (ref 34.8–46.1)
HGB BLD-MCNC: 13.1 G/DL (ref 11.5–15.4)
IMM GRANULOCYTES # BLD AUTO: 0.05 THOUSAND/UL (ref 0–0.2)
IMM GRANULOCYTES NFR BLD AUTO: 0 % (ref 0–2)
LYMPHOCYTES # BLD AUTO: 0.52 THOUSANDS/ÂΜL (ref 0.6–4.47)
LYMPHOCYTES NFR BLD AUTO: 4 % (ref 14–44)
MCH RBC QN AUTO: 30.3 PG (ref 26.8–34.3)
MCHC RBC AUTO-ENTMCNC: 32.8 G/DL (ref 31.4–37.4)
MCV RBC AUTO: 93 FL (ref 82–98)
MONOCYTES # BLD AUTO: 0.62 THOUSAND/ÂΜL (ref 0.17–1.22)
MONOCYTES NFR BLD AUTO: 5 % (ref 4–12)
NEUTROPHILS # BLD AUTO: 11.3 THOUSANDS/ÂΜL (ref 1.85–7.62)
NEUTS SEG NFR BLD AUTO: 91 % (ref 43–75)
NRBC BLD AUTO-RTO: 0 /100 WBCS
PLATELET # BLD AUTO: 217 THOUSANDS/UL (ref 149–390)
PMV BLD AUTO: 9.1 FL (ref 8.9–12.7)
POTASSIUM SERPL-SCNC: 4 MMOL/L (ref 3.5–5.3)
RBC # BLD AUTO: 4.32 MILLION/UL (ref 3.81–5.12)
SODIUM SERPL-SCNC: 133 MMOL/L (ref 135–147)
WBC # BLD AUTO: 12.52 THOUSAND/UL (ref 4.31–10.16)

## 2024-06-04 PROCEDURE — 93294 REM INTERROG EVL PM/LDLS PM: CPT | Performed by: INTERNAL MEDICINE

## 2024-06-04 PROCEDURE — 71045 X-RAY EXAM CHEST 1 VIEW: CPT

## 2024-06-04 PROCEDURE — 80048 BASIC METABOLIC PNL TOTAL CA: CPT | Performed by: STUDENT IN AN ORGANIZED HEALTH CARE EDUCATION/TRAINING PROGRAM

## 2024-06-04 PROCEDURE — 36415 COLL VENOUS BLD VENIPUNCTURE: CPT | Performed by: STUDENT IN AN ORGANIZED HEALTH CARE EDUCATION/TRAINING PROGRAM

## 2024-06-04 PROCEDURE — 96360 HYDRATION IV INFUSION INIT: CPT

## 2024-06-04 PROCEDURE — 84484 ASSAY OF TROPONIN QUANT: CPT | Performed by: STUDENT IN AN ORGANIZED HEALTH CARE EDUCATION/TRAINING PROGRAM

## 2024-06-04 PROCEDURE — 99285 EMERGENCY DEPT VISIT HI MDM: CPT | Performed by: STUDENT IN AN ORGANIZED HEALTH CARE EDUCATION/TRAINING PROGRAM

## 2024-06-04 PROCEDURE — 93005 ELECTROCARDIOGRAM TRACING: CPT

## 2024-06-04 PROCEDURE — 99285 EMERGENCY DEPT VISIT HI MDM: CPT

## 2024-06-04 PROCEDURE — 85025 COMPLETE CBC W/AUTO DIFF WBC: CPT | Performed by: STUDENT IN AN ORGANIZED HEALTH CARE EDUCATION/TRAINING PROGRAM

## 2024-06-04 RX ADMIN — SODIUM CHLORIDE 500 ML: 0.9 INJECTION, SOLUTION INTRAVENOUS at 15:35

## 2024-06-04 NOTE — ED NOTES
Pacemaker interrogation completed by RN and report received. Also spoke to Marcellus from [facility] to make sure since a report was sent by patient yesterday we may be missing important events from before that transmission - patient had an event yesterday of aflutter 1:1, similar events throughout the month of May.      Tarun Harris RN  06/04/24 1401

## 2024-06-04 NOTE — PROGRESS NOTES
"Results for orders placed or performed in visit on 06/04/24   Cardiac EP device report    Narrative    MDT DC PM - ACTIVE SYSTEM IS MRI CONDITIONAL  CARELINK TRANSMISSION: BATTERY STATUS \"13 YRS.\" AP 3%  32%. ALL AVAILABLE LEAD PARAMETERS WITHIN NORMAL LIMITS. MULTIPLE VHRS & FAST A/V NOTED. RATES >130 BPM. 79% JOHANNY/FL BURDEN. PT PRESENTING SHOWING AF W/RVR@ 150 BPM. PT STATES SHE'S BEEN DEALING W/PAIN FROM POLYARTHRALGIA & OFF/ON OF PREDNISONE. DENIES ANY OTHER SYMPTOMS. AVAIL EGRAMS SHOWING RVR, AFIB/FLU, & SVT. CAN'T EXCLUDE NSVT. PT ON METO SUCC & ELIQUIS. EF 65% (ECHO 2023). NORMAL DEVICE FUNCTION. NC         "

## 2024-06-04 NOTE — ED PROVIDER NOTES
"History  Chief Complaint   Patient presents with    Shortness of Breath     Pt reports waking up this morning with bilateral arm pain, had alert on pacemaker states \"It alerted that I had high blood pressure\" unsure if they alerted her because she had a change in rhythm. Pain \"throughout body\" but denies active chest pain    Pacemaker Problem     HPI    Patient is an 88-year-old female present emerged department for abnormal report from her pacemaker.  Patient stated she woke up this morning not feeling well.  Her whole body hurt.  Patient also reports intermittent shortness of breath.  Denies any current chest pain or difficulty breathing.  Denies any fever chills or URI symptoms.  The [employer] service told her that her blood pressure was high and she go to the emergency department.  History includes polymyalgia rheumatica, hypertension.    Prior to Admission Medications   Prescriptions Last Dose Informant Patient Reported? Taking?   ELIQUIS 5 MG  Self Yes No   Sig: Take 5 mg by mouth 2 (two) times a day   Sennosides (LAXATIVE PILLS PO)  Self Yes No   Sig: Take 1 tablet by mouth Twice daily   Sodium Fluoride 5000 PPM 1.1 % GEL  Self Yes No   Sig: BRUSH TWICE DAILY   aspirin (ECOTRIN LOW STRENGTH) 81 mg EC tablet  Self Yes No   Sig: Take 81 mg by mouth daily   atorvastatin (LIPITOR) 20 mg tablet  Self Yes No   Sig: Take 1 tablet by mouth daily   cholecalciferol (VITAMIN D3) 400 units tablet  Self Yes No   Sig: Take 400 Units by mouth daily   fluticasone (FLONASE) 50 mcg/act nasal spray  Self Yes No   Sig: SPRAY 2 SPRAYS INTO EACH NOSTRIL EVERY DAY   gabapentin (NEURONTIN) 300 mg capsule  Self Yes No   Sig: Take 300 mg by mouth daily at bedtime   lisinopril-hydrochlorothiazide (PRINZIDE,ZESTORETIC) 20-12.5 MG per tablet  Self Yes No   Sig: Take 0.5 tablets by mouth daily   loratadine (CLARITIN) 10 mg tablet  Self Yes No   Sig: Take 10 mg by mouth daily   metoprolol succinate (TOPROL-XL) 25 mg 24 hr tablet  Self No " No   Sig: Take 1 tablet (25 mg total) by mouth daily Take 1 tablet daily   predniSONE 10 mg tablet  Self Yes No   Sig: Take 5 mg by mouth daily       Facility-Administered Medications: None       Past Medical History:   Diagnosis Date    PMR (polymyalgia rheumatica) ([facility])     s/p Medtronic dual chamber PPM with left bundle pacing lead 8/7/2023 8/8/2023    Stroke ([facility])        Past Surgical History:   Procedure Laterality Date    CARDIAC ELECTROPHYSIOLOGY PROCEDURE N/A 8/7/2023    Procedure: Cardiac pacer implant;  Surgeon: Abimael Bain DO;  [facility];  Service: Cardiology    IR AORTAGRAM WITH RUN-OFF  3/9/2021    REPLACEMENT TOTAL KNEE         History reviewed. No pertinent family history.  I have reviewed and agree with the history as documented.    E-Cigarette/Vaping    E-Cigarette Use Never User      E-Cigarette/Vaping Substances    Nicotine No     THC No     CBD No     Flavoring No     Other No     Unknown No      Social History     Tobacco Use    Smoking status: Never     Passive exposure: Never    Smokeless tobacco: Never   Vaping Use    Vaping status: Never Used   Substance Use Topics    Alcohol use: No    Drug use: No       Review of Systems   Constitutional:  Negative for chills and fever.   HENT:  Negative for ear pain and sore throat.    Eyes:  Negative for pain and visual disturbance.   Respiratory:  Positive for shortness of breath. Negative for cough.    Cardiovascular:  Negative for chest pain and palpitations.   Gastrointestinal:  Negative for abdominal pain and vomiting.   Genitourinary:  Negative for dysuria and hematuria.   Musculoskeletal:  Negative for arthralgias and back pain.   Skin:  Negative for color change and rash.   Neurological:  Positive for weakness. Negative for seizures and syncope.   All other systems reviewed and are negative.      Physical Exam  Physical Exam  Vitals and nursing note reviewed.   Constitutional:       General: She is not in acute distress.      Appearance: She is well-developed.   HENT:      Head: Normocephalic and atraumatic.   Eyes:      Conjunctiva/sclera: Conjunctivae normal.   Cardiovascular:      Rate and Rhythm: Normal rate and regular rhythm.      Heart sounds: No murmur heard.  Pulmonary:      Effort: Pulmonary effort is normal. No respiratory distress.      Breath sounds: Normal breath sounds.   Abdominal:      Palpations: Abdomen is soft.      Tenderness: There is no abdominal tenderness.   Musculoskeletal:         General: No swelling.      Cervical back: Neck supple.   Skin:     General: Skin is warm and dry.      Capillary Refill: Capillary refill takes less than 2 seconds.   Neurological:      General: No focal deficit present.      Mental Status: She is alert and oriented to person, place, and time.   Psychiatric:         Mood and Affect: Mood normal.         Vital Signs  ED Triage Vitals [06/04/24 1502]   Temperature Pulse Respirations Blood Pressure SpO2   97.6 °F (36.4 °C) (!) 118 21 (!) 143/111 94 %      Temp Source Heart Rate Source Patient Position - Orthostatic VS BP Location FiO2 (%)   Temporal Monitor Sitting Left arm --      Pain Score       --           Vitals:    06/04/24 1502   BP: (!) 143/111   Pulse: (!) 118   Patient Position - Orthostatic VS: Sitting         Visual Acuity      ED Medications  Medications   sodium chloride 0.9 % bolus 500 mL (has no administration in time range)       Diagnostic Studies  Results Reviewed       Procedure Component Value Units Date/Time    CBC and differential [[id number]]     Lab Status: No result Specimen: Blood     Basic metabolic panel [[id number]]     Lab Status: No result Specimen: Blood     HS Troponin 0hr (reflex protocol) [[id number]]     Lab Status: No result Specimen: Blood                    XR chest 1 view portable    (Results Pending)              Procedures  Procedures         ED Course                                             Medical Decision Making  EKG: rate 95, a fibb . No  signs of acute ischemic change, compared to 8/23    Amount and/or Complexity of Data Reviewed  Labs: ordered.  Radiology: ordered.             Disposition  Final diagnoses:   None     ED Disposition       None          Follow-up Information    None         Patient's Medications   Discharge Prescriptions    No medications on file       No discharge procedures on file.    PDMP Review         Value Time User    PDMP Reviewed  Yes 8/4/2023 10:26 PM Tracee Moctezuma MD            ED Provider  Electronically Signed by                Workstation performed: [id number]                    Procedures  Procedures         ED Course                               SBIRT 20yo+      Flowsheet Row Most Recent Value   Initial Alcohol Screen: US AUDIT-C     1. How often do you have a drink containing alcohol? 0 Filed at: 06/04/2024 1530   2. How many drinks containing alcohol do you have on a typical day you are drinking?  0 Filed at: 06/04/2024 1530   3b. FEMALE Any Age, or MALE 65+: How often do you have 4 or more drinks on one occassion? 0 Filed at: 06/04/2024 1530   Audit-C Score 0 Filed at: 06/04/2024 1530   PREETHI: How many times in the past year have you...    Used an illegal drug or used a prescription medication for non-medical reasons? Never Filed at: 06/04/2024 1530                      Medical Decision Making  EKG: rate 95, a fibb . No signs of acute ischemic change, compared to 8/23    Differential arrhythmia, electrolyte abnormality, ACS.    Patient is a well-appearing 89-year-old female presenting with primary acute respiratory distress and vital signs unremarkable.  Patient has negative troponin x 2 and additional labs unremarkable.  Patient given a small fluid bolus and pulse improvement of symptoms.  Chest x-ray shows no acute cardiopulmonary process.  Able to review the patient's alert.  Patient has remained asymptomatic while in the department.  No recurrence of symptoms in the department and telemetry here unremarkable.  Discussed need for follow-up with cardiology.  Patient did not want to stay and wanted to complete workup in the patient setting.  Discussed return and follow-up precautions.  Physician discharge.    Amount and/or Complexity of Data Reviewed  Labs: ordered.  Radiology: ordered and independent interpretation performed.  ECG/medicine tests: ordered and independent interpretation performed.             Disposition  Final diagnoses:   Weakness     Time reflects when diagnosis was documented in both MDM as applicable and  the Disposition within this note       Time User Action Codes Description Comment    6/4/2024  5:29 PM Lucero Fritz Add [R53.1] Weakness           ED Disposition       ED Disposition   Discharge    Condition   Stable    Date/Time   Tue Jun 4, 2024 1756    Comment   Tami Higueraker discharge to home/self care.                   Follow-up Information       Follow up With Specialties Details Why Contact Info    Kane Fields MD Family Medicine Schedule an appointment as soon as possible for a visit   [address]  [address]  [place] [address]  [phone]              Discharge Medication List as of 6/4/2024  5:59 PM        CONTINUE these medications which have NOT CHANGED    Details   aspirin (ECOTRIN LOW STRENGTH) 81 mg EC tablet Take 81 mg by mouth daily, Historical Med      atorvastatin (LIPITOR) 20 mg tablet Take 1 tablet by mouth daily, Historical Med      cholecalciferol (VITAMIN D3) 400 units tablet Take 400 Units by mouth daily, Historical Med      ELIQUIS 5 MG Take 5 mg by mouth 2 (two) times a day, Starting Wed 12/20/2017, Historical Med      gabapentin (NEURONTIN) 300 mg capsule Take 300 mg by mouth daily at bedtime, Starting Thu 4/28/2022, Historical Med      lisinopril-hydrochlorothiazide (PRINZIDE,ZESTORETIC) 20-12.5 MG per tablet Take 0.5 tablets by mouth daily, Historical Med      predniSONE 5 mg tablet Take 5 mg by mouth daily , Historical Med      metoprolol succinate (TOPROL-XL) 25 mg 24 hr tablet Take 1 tablet (25 mg total) by mouth daily Take 1 tablet daily, Starting Mon 6/29/2020, No Print      fluticasone (FLONASE) 50 mcg/act nasal spray SPRAY 2 SPRAYS INTO EACH NOSTRIL EVERY DAY, Historical Med      loratadine (CLARITIN) 10 mg tablet Take 10 mg by mouth daily, Starting Thu 12/7/2017, Historical Med      Sennosides (LAXATIVE PILLS PO) Take 1 tablet by mouth Twice daily, Historical Med      Sodium Fluoride 5000 PPM 1.1 % GEL BRUSH TWICE DAILY, Historical Med             No discharge  procedures on file.    PDMP Review         Value Time User    PDMP Reviewed  Yes 8/4/2023 10:26 PM Tracee Moctezuma MD            ED Provider  Electronically Signed by             Lucero Fritz DO  06/19/24 2016

## 2024-06-04 NOTE — DISCHARGE INSTRUCTIONS
Continue taking all your medications as prescribed.  Continue to be well-hydrated.    Follow-up with your cardiologist for reevaluation.    Return if develop any new or worsening symptoms such as chest pain, shortness of breath or difficulty breathing

## 2024-06-05 ENCOUNTER — TELEPHONE (OUTPATIENT)
Dept: CARDIOLOGY CLINIC | Facility: CLINIC | Age: 89
End: 2024-06-05

## 2024-06-05 LAB
ATRIAL RATE: 95 BPM
QRS AXIS: -65 DEGREES
QRSD INTERVAL: 92 MS
QT INTERVAL: 370 MS
QTC INTERVAL: 464 MS
T WAVE AXIS: 84 DEGREES
VENTRICULAR RATE: 95 BPM

## 2024-06-05 PROCEDURE — 93010 ELECTROCARDIOGRAM REPORT: CPT | Performed by: INTERNAL MEDICINE

## 2024-06-05 NOTE — TELEPHONE ENCOUNTER
----- Message from Sarah ARGUELLO sent at 6/4/2024 12:27 PM EDT -----  Pt states she hasn't seen her Cardio in some time and if needed would like an appt. This pacemaker transmission showing ventricular rates >130 bpm.   
Does patient need to be seen for follow up?  
Patient is scheduled  
[id number]

## 2024-06-12 ENCOUNTER — OFFICE VISIT (OUTPATIENT)
Dept: CARDIOLOGY CLINIC | Facility: CLINIC | Age: 89
End: 2024-06-12
Payer: MEDICARE

## 2024-06-12 VITALS
SYSTOLIC BLOOD PRESSURE: 130 MMHG | BODY MASS INDEX: 29.77 KG/M2 | RESPIRATION RATE: 16 BRPM | WEIGHT: 168 LBS | OXYGEN SATURATION: 97 % | HEART RATE: 78 BPM | HEIGHT: 63 IN | DIASTOLIC BLOOD PRESSURE: 74 MMHG

## 2024-06-12 DIAGNOSIS — Z79.01 CHRONIC ANTICOAGULATION: ICD-10-CM

## 2024-06-12 DIAGNOSIS — I10 PRIMARY HYPERTENSION: ICD-10-CM

## 2024-06-12 DIAGNOSIS — I49.5 TACHY-BRADY SYNDROME (HCC): ICD-10-CM

## 2024-06-12 DIAGNOSIS — Z95.0 PACEMAKER: ICD-10-CM

## 2024-06-12 DIAGNOSIS — I48.0 PAROXYSMAL A-FIB (HCC): Primary | ICD-10-CM

## 2024-06-12 DIAGNOSIS — E78.2 COMBINED HYPERLIPIDEMIA: ICD-10-CM

## 2024-06-12 PROCEDURE — 99213 OFFICE O/P EST LOW 20 MIN: CPT | Performed by: NURSE PRACTITIONER

## 2024-06-12 RX ORDER — METOPROLOL SUCCINATE 50 MG/1
50 TABLET, EXTENDED RELEASE ORAL DAILY
Qty: 90 TABLET | Refills: 3 | Status: SHIPPED | OUTPATIENT
Start: 2024-06-12

## 2024-06-12 NOTE — PROGRESS NOTES
Cardiology Office Note    Tami Estrada 89 y.o. female MRN: [id number]    06/12/24          Assessment/Plan:    1.  Paroxysmal atrial fibrillation/flutter with episodes of RVR  - Recent EKG noted controlled atrial flutter with a rate of 95, episodes of RVR noted on pacemaker interrogation  - Patient with chronic pain  - Will increase metoprolol succinate to 50 mg daily with close monitoring of her blood pressure  -Continue Eliquis for anticoagulation    2.  Tachybradycardia syndrome s/p Medtronic pacemaker implantation  - Continue follow-up with device clinic    3.  Hypertension  - Blood pressure elevated today  - Continue lisinopril-hydrochlorothiazide and metoprolol succinate but increase to 50 mg daily    4.  Hyperlipidemia  - Continue atorvastatin    5.  History of CVA    Follow up: 3 months or sooner as needed    1. Paroxysmal A-fib (HCC)  metoprolol succinate (TOPROL-XL) 50 mg 24 hr tablet      2. Chronic anticoagulation        3. Combined hyperlipidemia        4. s/p Medtronic dual chamber PPM with left bundle pacing lead 8/7/2023        5. Primary hypertension        6. Tachy-terry syndrome (HCC)            HPI: Tami Estrada is a 89 y.o. year old female with a past medical history of paroxysmal atrial fibrillation on Eliquis, hypertension, hyperlipidemia, CVA, tachybradycardia syndrome s/p Medtronic dual-chamber pacemaker implantation, PAD s/p right SFA and popliteal PCI who presents today for hospital follow-up.  She went to the emergency room on 6/4/2024 secondary to bilateral arm pain and body pain and stated that her pacemaker alerted her that her blood pressure was high.  Workup there was overall unremarkable was noted to be in atrial flutter on EKG. recent pacemaker interrogation revealed episodes of atrial fibrillation with RVR as her as SVT and possible NSVT on her pacemaker interrogation.  She is following up today in the cardiology office for further management.    Since her emergency room visit  she reports that she has been feeling fine.  She states that she was sent to the emergency room by her podiatrist over concern for her symptoms.  She denies any sort of palpitations overall feels well.  When her episodes of atrial fibrillation with RVR we will increase her metoprolol succinate to 50 mg daily with close monitoring of her blood pressure.  Review of her EMR shows blood pressures are typically elevated, and she was instructed to monitor her blood pressure daily and to notify the office if it gets below 110 systolic.    Otherwise, she denies chest pain, dyspnea on exertion or rest, significant lower extremity edema, lightheadedness, dizziness, syncopal episodes, and was instructed to call  the office or seek medical attention if any such symptoms develop.  He states that she does get swelling in her left leg gradually accumulates over the day but it is resolved by the morning.    Her last TTE was performed in August 2023 and revealed mild concentric LV hypertrophy, normal systolic function with an EF of 65% with no regional wall motion abnormalities and normal diastolic function and mild mitral valve regurgitation.    All medications reviewed and patient is tolerating medications without side effects. Refills were sent if needed.       Patient Active Problem List   Diagnosis    Paroxysmal A-fib (HCC)    Chronic anticoagulation    Hypertension, essential    Combined hyperlipidemia    History of left knee replacement    Atherosclerosis of artery of both lower extremities (HCC)    Stage 3a chronic kidney disease (HCC)    Syncope and collapse    Hyponatremia    Sinus pain    Pre-syncope    Sinus pause    Lung nodule seen on imaging study    Multinodular goiter    s/p Medtronic dual chamber PPM with left bundle pacing lead 8/7/2023       Allergies   Allergen Reactions    Hydrocodone     Oxycodone Anxiety and Headache         Current Outpatient Medications:     aspirin (ECOTRIN LOW STRENGTH) 81 mg EC tablet,  Take 81 mg by mouth daily, Disp: , Rfl:     atorvastatin (LIPITOR) 20 mg tablet, Take 1 tablet by mouth daily, Disp: , Rfl:     cholecalciferol (VITAMIN D3) 400 units tablet, Take 400 Units by mouth daily, Disp: , Rfl:     ELIQUIS 5 MG, Take 5 mg by mouth 2 (two) times a day, Disp: , Rfl: 3    fluticasone (FLONASE) 50 mcg/act nasal spray, SPRAY 2 SPRAYS INTO EACH NOSTRIL EVERY DAY, Disp: , Rfl:     gabapentin (NEURONTIN) 300 mg capsule, Take 300 mg by mouth daily at bedtime, Disp: , Rfl:     lisinopril-hydrochlorothiazide (PRINZIDE,ZESTORETIC) 20-12.5 MG per tablet, Take 1 tablet by mouth daily, Disp: , Rfl:     loratadine (CLARITIN) 10 mg tablet, Take 10 mg by mouth daily, Disp: , Rfl: 5    metoprolol succinate (TOPROL-XL) 50 mg 24 hr tablet, Take 1 tablet (50 mg total) by mouth daily Take 1 tablet daily, Disp: 90 tablet, Rfl: 3    predniSONE 5 mg tablet, Take 5 mg by mouth daily, Disp: , Rfl:     Sennosides (LAXATIVE PILLS PO), Take 1 tablet by mouth Twice daily, Disp: , Rfl:     Sodium Fluoride 5000 PPM 1.1 % GEL, BRUSH TWICE DAILY, Disp: , Rfl:     Past Medical History:   Diagnosis Date    PMR (polymyalgia rheumatica) (HCC)     s/p Medtronic dual chamber PPM with left bundle pacing lead 8/7/2023 8/8/2023    Stroke (HCC)        No family history on file.    Past Surgical History:   Procedure Laterality Date    CARDIAC ELECTROPHYSIOLOGY PROCEDURE N/A 8/7/2023    Procedure: Cardiac pacer implant;  Surgeon: Abimael Bain DO;  Location: [facility];  Service: Cardiology    IR AORTAGRAM WITH RUN-OFF  3/9/2021    REPLACEMENT TOTAL KNEE         Social History     Socioeconomic History    Marital status: /Civil Union     Spouse name: Not on file    Number of children: Not on file    Years of education: Not on file    Highest education level: Not on file   Occupational History    Not on file   Tobacco Use    Smoking status: Never     Passive exposure: Never    Smokeless tobacco: Never   Vaping Use    Vaping  "status: Never Used   Substance and Sexual Activity    Alcohol use: No    Drug use: No    Sexual activity: Not on file   Other Topics Concern    Not on file   Social History Narrative    Not on file     Social Determinants of Health     Financial Resource Strain: Not on file   Food Insecurity: No Food Insecurity (8/9/2023)    Hunger Vital Sign     Worried About Running Out of Food in the Last Year: Never true     Ran Out of Food in the Last Year: Never true   Transportation Needs: No Transportation Needs (8/9/2023)    PRAPARE - Transportation     Lack of Transportation (Medical): No     Lack of Transportation (Non-Medical): No   Physical Activity: Not on file   Stress: Not on file   Social Connections: Not on file   Intimate Partner Violence: Not on file   Housing Stability: Low Risk  (8/9/2023)    Housing Stability Vital Sign     Unable to Pay for Housing in the Last Year: No     Number of Times Moved in the Last Year: 1     Homeless in the Last Year: No       Review of symptoms:   Constitution:  Negative  HEENT:  Negative  Cardiovascular:  Negative  Respiratory:  Negative  Skin:  Negative  Gastrointestinal:  Negative  Genitourinary:  Negative  Musculoskeletal:  Negative  Neurological:  Negative  Endocrine:  Negative  Psychological:  Negative    Vitals: /74 (BP Location: Right arm, Patient Position: Sitting, Cuff Size: Standard)   Pulse 78   Resp 16   Ht 5' 3\" (1.6 m)   Wt 76.2 kg (168 lb)   SpO2 97%   BMI 29.76 kg/m²         Physical Exam:     GEN: Alert and oriented x 3, in no acute distress.  Well appearing and well nourished.   HEENT: Sclera anicteric, conjunctivae pink, mucous membranes moist.   NECK: Supple, no carotid bruits, no significant JVD. Trachea midline.  HEART: + Irregularly irregular rhythm, normal S1 and S2, no murmurs, clicks, gallops or rubs.   LUNGS: Clear to auscultation bilaterally; no wheezes, rales, or rhonchi. No increased work of breathing or signs of respiratory distress. "   ABDOMEN: Soft, nontender, nondistended, normoactive bowel sounds.   EXTREMITIES: Skin warm and well perfused, no clubbing, cyanosis, or edema.  NEURO: No focal findings. Normal speech. Mood and affect normal.   SKIN: Normal without suspicious lesions on exposed skin.

## 2024-06-24 NOTE — PROGRESS NOTES
"Ambulatory Visit  Name: Tami Estrada      : 1935      MRN: [id number]  Encounter Provider: Beth Schreiber PA-C  Encounter Date: 2024   Encounter department: [facility] [place]    Assessment & Plan   1. Atherosclerosis of artery of both lower extremities (HCC)  Assessment & Plan:    -L lateral calf pain when walking especially up hills  -No ischemic rest pain or tissue loss  -Generally more fatigued and doing less around the house  -B foot neuropathy \"pins and needles\" which is unchanged    Imaging:  -DANTE 24  R  NC/169/44; patent proximal and mid SFA and stent, > 75% SFA and stent  L NC/flatline met and toe; occlusion prox to mid SFA with recon distal SFA and reocclusions popliteal.  Distal AT occluded.     -DANTE 23    R NC/63/46; 50-75% stenosis prox/mid SFA and stent; widely patent popliteal artery and stent    L NC//--; Occlusion of the prox SFA with recon of the distal SFA. 50-75% dSFA / prox popliteal artery. AT artery HG v occlusion     DANTE 22    R NC/52/48, patent SFA and popl stents, 50-75% prox SFA.    L NC/34/9    No progression of disease    Exam:  -R good multiphasic DP; + AT/DP signals; foot warm and well-perfused; m-s intact; <2 sec cap refill  -L mild dependent rubor the the distal forefoot which improves on massage and elevation; monophasic DP/PT signals; m-s intact  -No foot wounds or weeping      Discussion: Patient with bilateral PAD and SFA disease. She has no significant right leg symptoms.  She has developed early calf claudication and dependent rubor to the left foot along with bilateral neuropathy in the toes which is unchanged.      We reviewed her vascular testing.  On the right, doppler shows worsening stenosis in the mid SFA/stent with metatarsal and toe pressures above the healing level.  Good DP signal on exam.  On the left, there is redemonstration of the proximal to mid SFA with distal SFA occlusion which was seen in the past.  She now " has above-knee popliteal occlusion which she is likely symptomatic with calf claudication.  Left foot metatarsal toe pressures are flatlined as prior.    We had a long discussion regarding PAD.  At this point, she is more symptomatic, but still getting around.  At her age, in the absence of ischemic rest pain or tissue loss, recommend medical therapy and close monitoring. Since her metatarsal and toe pressures in the left foot have been flatlined.  She is advised to wear good footwear and avoid foot wounds.  She understands, that if she has any worsening symptoms, she should call the office right away for reevaluation.    Plan:  -Short distance left leg claudication with bilateral SFA disease and bilateral foot neuropathy  -Symptoms have worsened with more claudication, but she has no ischemic rest pain or tissue loss  -We reviewed her Doppler studies and discussed PAD, including indications for invasive procedures which would be reserved for ischemic rest pain or tissue loss  -Advised to avoid foot wounds; wear good protective footwear, check feet daily amd call office for any wounds or changes  -Continue with Eliquis 5 twice a day, aspirin 81 and atorvastatin 20  -Call office if you have increased leg or foot pain particularly at rest or at night  -Follow-up office visit in 6 months and Doppler in 1 year  Orders:  -     VAS ARTERIAL DUPLEX- LOWER LIMB BILATERAL; Future; Expected date: 05/29/2025  2. Hypertension, essential  3. Combined hyperlipidemia  4. Stage 3a chronic kidney disease (HCC)  5. Chronic anticoagulation  6. Embolism and thrombosis of arteries of the lower extremities (HCC)  7. Benign hypertension with stage 3b chronic kidney disease (HCC)      CC: Patient presents today to review DANTE done 5/28/24. H/o R SFA and popliteal artery angioplasty and stent 3/2021. Reports L calf cramping/tightening with walking short distances. No wounds.   History of Present Illness     Tami Estrada is a 89 y.o. female  "L TKR ('17), lumbar disc disease/spinal fusion ('14) Htn, HLD, paroxAF on apixaban, CKD 3a, Hx PMR, \"mini-stroke\", PAD s/p right LE angioplasty and stenting.  She underwent right lower extremity procedures in March of 2021 for rest pain and great toe wound which was able to heal after revascularization.     10/16/2023:   Patient presents for her annual office visit and review of lower extremity arterial duplex study.       From vascular standpoint, she has no new leg or foot symptoms.  She has longstanding neuropathy in the feet but essentially unchanged.  She takes gabapentin especially at night.  She continues to stay active and does a lot of her own work around the house work around the house.  With ADLs, she reports that the legs do get tired so she sits and rests until she can get up and work again.  She does have a cleaning lady to help her out.      She has no new leg symptoms, ischemic rest pain or tissue loss.  She does walk around the house barefoot which I advised against given her underlying artery disease. We discussed the importance of monitoring her feet for any color or temperature changes or wounds that do not heal for which she should call the office right away.     Since last year, she did have a syncopal episode due to 8-second pauses and required pacemaker.  She feels much better and safer now that she has the pacer.     She remains on apixaban 5 twice daily, aspirin and atorvastatin 20.  We reviewed her recent testing.       6/25/24:   Patient returns for vascular follow-up and to review recent Doppler.    The patient reports that her legs \"hurt\" when she walks, particularly going up hills.  She specifically complains of left lateral calf pain with activity. Also, she can only stand for so long and that she has to sit down.    Generally, not doing this much work and activities around the house as she had previously.  She does have a walker which she uses at times.  Today, she comes into " "unassisted without a walker and appears pretty stable.  She feels had a holding onto a shopping cart around the store and has less pain.    She continues to report \"pins-and-needles\" in the feet which has not worsened.    She has no new foot pain or worsening neuropathy.  No ischemic rest pain.  No nighttime pain.  No tissue loss.    The distal left forefoot appears to have mild dependent rubor which massaged out and his skin appeared normal with elevation.    Patient confirms that she is taking apixaban 5 twice daily, aspirin and atorvastatin 20.      LDL 58      -Legs hurt and tire  -L calf claudication - short distance  -Pins and needles in the feet  -No rest pain or night time pain  -Legs feel better within the past or lying down with elevation      DANTE 5/28/24:  [facility]PORT  CLINICAL:  Indications:  Yearly surveillance for progression of disease. Patient complains  of worsening left leg pain and tingling.  Operative History:  2023-08-08 Pacer  2021-03-29 Right SFA and popliteal artery angioplasty and stent  Risk Factors  The patient has history of HTN, Hyperlipidemia, CKD and PAD.  Clinical  Right Pressure:  117/ mm Hg, Left Pressure:  126/ mm Hg.     FINDINGS:     Segment                Right                   Left                                            Impression  PSV (cm/s)  Impression  PSV (cm/s)    Common Femoral Artery                      70                      92    Prox Profunda                              90                      77    Prox SFA                                       Occluded            37    Prox SFA - Stent                           90                            Mid SFA                                        Occluded             0    Mid SFA - Stent        >75%               440                            Dist SFA                                   59                      34    Proximal Pop                               59  Occluded            14    Distal Pop       " "                          33                      22    Tibioperoneal                              47                            Dist Post Tibial                           38                      13    Dist. Ant. Tibial                          44  Occluded             0             CONCLUSION:  Impression:  RIGHT LOWER LIMB:  Findings suggest a patent proximal and mid superficial femoral artery and stent  with a >75% restenosis of the mid superficial femoral artery and stent.  Ankle/Brachial index: unreliable due to calcified veseels (Prior unreliable)  PVR/ PPG tracings are dampened.  Metatarsal pressure of 169mmHg  Great toe pressure of 44mmHg, within the healing range       LEFT LOWER LIMB:  There is an occlusion of the proximal and mid superficial femoral artery with  reconstitution of the distal superficial femoral artery and reoccludes at the  above knee popliteal artery.  The distal anterior tibial artery is occluded.  Ankle/Brachial index: unreliable due to calcified vessels (Prior unreliable)  PVR/ PPG tracings are flat line  Metatarsal and great toe pressures are unobtainable due to flat line PPG  tracings.     Compared to previous study on 05/24/2023, there is progression of disease in  the right mid SFA stent, the left above knee popliteal artery is now occluded.    Review of Systems   Constitutional: Negative.    HENT:  Positive for hearing loss.    Eyes: Negative.    Respiratory: Negative.     Cardiovascular: Negative.    Gastrointestinal: Negative.    Endocrine: Negative.    Genitourinary: Negative.    Musculoskeletal:         Calf cramping with walking   Skin: Negative.    Allergic/Immunologic: Negative.    Neurological: Negative.    Hematological: Negative.    Psychiatric/Behavioral: Negative.         Objective     /70 (BP Location: Left arm, Patient Position: Sitting)   Pulse 84   Ht 5' 3\" (1.6 m)   Wt 76.2 kg (168 lb)   BMI 29.76 kg/m²           Physical Exam  Vitals and nursing note " "reviewed.   Constitutional:       Appearance: She is well-developed.   HENT:      Head: Normocephalic and atraumatic.   Eyes:      Pupils: Pupils are equal, round, and reactive to light.   Neck:      Vascular: No JVD.   Cardiovascular:      Rate and Rhythm: Normal rate. Rhythm irregular.      Pulses:           Radial pulses are 2+ on the right side and 2+ on the left side.        Dorsalis pedis pulses are detected w/ Doppler on the right side and detected w/ Doppler on the left side.        Posterior tibial pulses are detected w/ Doppler on the right side and detected w/ Doppler on the left side.      Heart sounds: S1 normal and S2 normal. Murmur heard.      Systolic murmur is present with a grade of 2/6.      No friction rub. No gallop.   Pulmonary:      Effort: Pulmonary effort is normal. No accessory muscle usage or respiratory distress.      Breath sounds: Normal breath sounds. No wheezing or rales.   Abdominal:      General: Bowel sounds are normal. There is no distension.      Palpations: Abdomen is soft.      Tenderness: There is no abdominal tenderness.   Musculoskeletal:         General: No deformity. Normal range of motion.      Cervical back: Neck supple.      Right lower leg: No edema.      Left lower leg: No edema.   Skin:     General: Skin is warm and dry.      Findings: No lesion or rash.      Nails: There is no clubbing.   Neurological:      Mental Status: She is alert and oriented to person, place, and time.      Comments: Grossly normal    Psychiatric:         Behavior: Behavior is cooperative.       Administrative Statements           I have reviewed and made appropriate changes to the review of systems input by the medical assistant.    Vitals:    06/25/24 1022   BP: 124/70   BP Location: Left arm   Patient Position: Sitting   Pulse: 84   Weight: 76.2 kg (168 lb)   Height: 5' 3\" (1.6 m)       Patient Active Problem List   Diagnosis    Paroxysmal A-fib (HCC)    Chronic anticoagulation    " Hypertension, essential    Combined hyperlipidemia    History of left knee replacement    Atherosclerosis of artery of both lower extremities (HCC)    Stage 3a chronic kidney disease (HCC)    Syncope and collapse    Hyponatremia    Sinus pain    Pre-syncope    Sinus pause    Lung nodule seen on imaging study    Multinodular goiter    s/p Medtronic dual chamber PPM with left bundle pacing lead 8/7/2023    Embolism and thrombosis of arteries of the lower extremities (HCC)    Benign hypertension with stage 3b chronic kidney disease (HCC)       Past Surgical History:   Procedure Laterality Date    CARDIAC ELECTROPHYSIOLOGY PROCEDURE N/A 8/7/2023    Procedure: Cardiac pacer implant;  Surgeon: Abimael Bain DO;  Location: [facility];  Service: Cardiology    IR AORTAGRAM WITH RUN-OFF  3/9/2021    REPLACEMENT TOTAL KNEE         History reviewed. No pertinent family history.    Social History     Socioeconomic History    Marital status: /Civil Union     Spouse name: Not on file    Number of children: Not on file    Years of education: Not on file    Highest education level: Not on file   Occupational History    Not on file   Tobacco Use    Smoking status: Never     Passive exposure: Never    Smokeless tobacco: Never   Vaping Use    Vaping status: Never Used   Substance and Sexual Activity    Alcohol use: No    Drug use: No    Sexual activity: Not on file   Other Topics Concern    Not on file   Social History Narrative    Not on file     Social Determinants of Health     Financial Resource Strain: Not on file   Food Insecurity: No Food Insecurity (8/9/2023)    Hunger Vital Sign     Worried About Running Out of Food in the Last Year: Never true     Ran Out of Food in the Last Year: Never true   Transportation Needs: No Transportation Needs (8/9/2023)    PRAPARE - Transportation     Lack of Transportation (Medical): No     Lack of Transportation (Non-Medical): No   Physical Activity: Not on file   Stress: Not on  file   Social Connections: Not on file   Intimate Partner Violence: Not on file   Housing Stability: Low Risk  (8/9/2023)    Housing Stability Vital Sign     Unable to Pay for Housing in the Last Year: No     Number of Times Moved in the Last Year: 1     Homeless in the Last Year: No       Allergies   Allergen Reactions    Hydrocodone     Oxycodone Anxiety and Headache         Current Outpatient Medications:     aspirin (ECOTRIN LOW STRENGTH) 81 mg EC tablet, Take 81 mg by mouth daily, Disp: , Rfl:     atorvastatin (LIPITOR) 20 mg tablet, Take 1 tablet by mouth daily, Disp: , Rfl:     cholecalciferol (VITAMIN D3) 400 units tablet, Take 400 Units by mouth daily, Disp: , Rfl:     ELIQUIS 5 MG, Take 5 mg by mouth 2 (two) times a day, Disp: , Rfl: 3    fluticasone (FLONASE) 50 mcg/act nasal spray, SPRAY 2 SPRAYS INTO EACH NOSTRIL EVERY DAY, Disp: , Rfl:     gabapentin (NEURONTIN) 300 mg capsule, Take 300 mg by mouth daily at bedtime, Disp: , Rfl:     lisinopril-hydrochlorothiazide (PRINZIDE,ZESTORETIC) 20-12.5 MG per tablet, Take 1 tablet by mouth daily, Disp: , Rfl:     loratadine (CLARITIN) 10 mg tablet, Take 10 mg by mouth daily, Disp: , Rfl: 5    metoprolol succinate (TOPROL-XL) 50 mg 24 hr tablet, Take 1 tablet (50 mg total) by mouth daily Take 1 tablet daily, Disp: 90 tablet, Rfl: 3    predniSONE 5 mg tablet, Take 5 mg by mouth daily, Disp: , Rfl:     Sennosides (LAXATIVE PILLS PO), Take 1 tablet by mouth Twice daily, Disp: , Rfl:     Sodium Fluoride 5000 PPM 1.1 % GEL, BRUSH TWICE DAILY, Disp: , Rfl:

## 2024-06-25 ENCOUNTER — OFFICE VISIT (OUTPATIENT)
Dept: VASCULAR SURGERY | Facility: CLINIC | Age: 89
End: 2024-06-25
Payer: MEDICARE

## 2024-06-25 VITALS
HEART RATE: 84 BPM | DIASTOLIC BLOOD PRESSURE: 70 MMHG | WEIGHT: 168 LBS | HEIGHT: 63 IN | SYSTOLIC BLOOD PRESSURE: 124 MMHG | BODY MASS INDEX: 29.77 KG/M2

## 2024-06-25 DIAGNOSIS — N18.32 BENIGN HYPERTENSION WITH STAGE 3B CHRONIC KIDNEY DISEASE (HCC): ICD-10-CM

## 2024-06-25 DIAGNOSIS — N18.31 STAGE 3A CHRONIC KIDNEY DISEASE (HCC): ICD-10-CM

## 2024-06-25 DIAGNOSIS — I70.203 ATHEROSCLEROSIS OF ARTERY OF BOTH LOWER EXTREMITIES (HCC): Primary | ICD-10-CM

## 2024-06-25 DIAGNOSIS — I12.9 BENIGN HYPERTENSION WITH STAGE 3B CHRONIC KIDNEY DISEASE (HCC): ICD-10-CM

## 2024-06-25 DIAGNOSIS — Z79.01 CHRONIC ANTICOAGULATION: ICD-10-CM

## 2024-06-25 DIAGNOSIS — I10 HYPERTENSION, ESSENTIAL: ICD-10-CM

## 2024-06-25 DIAGNOSIS — E78.2 COMBINED HYPERLIPIDEMIA: ICD-10-CM

## 2024-06-25 DIAGNOSIS — I74.3 EMBOLISM AND THROMBOSIS OF ARTERIES OF THE LOWER EXTREMITIES (HCC): ICD-10-CM

## 2024-06-25 PROCEDURE — 99214 OFFICE O/P EST MOD 30 MIN: CPT | Performed by: PHYSICIAN ASSISTANT

## 2024-06-25 NOTE — ASSESSMENT & PLAN NOTE
"  -L lateral calf pain when walking especially up hills  -No ischemic rest pain or tissue loss  -Generally more fatigued and doing less around the house  -B foot neuropathy \"pins and needles\" which is unchanged    Imaging:  -DANTE 5/28/24  R  NC/169/44; patent proximal and mid SFA and stent, > 75% SFA and stent  L NC/flatline met and toe; occlusion prox to mid SFA with recon distal SFA and reocclusions popliteal.  Distal AT occluded.     -DANTE 5/24/23    R NC/63/46; 50-75% stenosis prox/mid SFA and stent; widely patent popliteal artery and stent    L NC/24/--; Occlusion of the prox SFA with recon of the distal SFA. 50-75% dSFA / prox popliteal artery. AT artery HG v occlusion     DANTE 5/27/22    R NC/52/48, patent SFA and popl stents, 50-75% prox SFA.    L NC/34/9    No progression of disease    Exam:  -R good multiphasic DP; + AT/DP signals; foot warm and well-perfused; m-s intact; <2 sec cap refill  -L mild dependent rubor the the distal forefoot which improves on massage and elevation; monophasic DP/PT signals; m-s intact  -No foot wounds or weeping      Discussion: Patient with bilateral PAD and SFA disease. She has no significant right leg symptoms.  She has developed early calf claudication and dependent rubor to the left foot along with bilateral neuropathy in the toes which is unchanged.      We reviewed her vascular testing.  On the right, doppler shows worsening stenosis in the mid SFA/stent with metatarsal and toe pressures above the healing level.  Good DP signal on exam.  On the left, there is redemonstration of the proximal to mid SFA with distal SFA occlusion which was seen in the past.  She now has above-knee popliteal occlusion which she is likely symptomatic with calf claudication.  Left foot metatarsal toe pressures are flatlined as prior.    We had a long discussion regarding PAD.  At this point, she is more symptomatic, but still getting around.  At her age, in the absence of ischemic rest pain or " tissue loss, recommend medical therapy and close monitoring. Since her metatarsal and toe pressures in the left foot have been flatlined.  She is advised to wear good footwear and avoid foot wounds.  She understands, that if she has any worsening symptoms, she should call the office right away for reevaluation.    Plan:  -Short distance left leg claudication with bilateral SFA disease and bilateral foot neuropathy  -Symptoms have worsened with more claudication, but she has no ischemic rest pain or tissue loss  -We reviewed her Doppler studies and discussed PAD, including indications for invasive procedures which would be reserved for ischemic rest pain or tissue loss  -Advised to avoid foot wounds; wear good protective footwear, check feet daily amd call office for any wounds or changes  -Continue with Eliquis 5 twice a day, aspirin 81 and atorvastatin 20  -Call office if you have increased leg or foot pain particularly at rest or at night  -Follow-up office visit in 6 months and Doppler in 1 year

## 2024-06-25 NOTE — PATIENT INSTRUCTIONS
-Short distance left leg claudication with bilateral neuropathy  -Continue with Eliquis 5 twice a day, aspirin 81 and atorvastatin 20  -Wear good protective footwear; avoid foot wounds; check feet daily and call office for any wounds or changes  -Call office if you have increased leg or foot pain particularly at rest or at night  -Follow-up office visit in 6 months and Doppler in 1 year

## 2024-09-03 ENCOUNTER — REMOTE DEVICE CLINIC VISIT (OUTPATIENT)
Dept: CARDIOLOGY CLINIC | Facility: CLINIC | Age: 89
End: 2024-09-03
Payer: MEDICARE

## 2024-09-03 DIAGNOSIS — I48.91 ATRIAL FIBRILLATION, UNSPECIFIED TYPE (HCC): ICD-10-CM

## 2024-09-03 DIAGNOSIS — I49.5 SSS (SICK SINUS SYNDROME) (HCC): Primary | ICD-10-CM

## 2024-09-03 PROCEDURE — 93296 REM INTERROG EVL PM/IDS: CPT | Performed by: INTERNAL MEDICINE

## 2024-09-03 PROCEDURE — 93294 REM INTERROG EVL PM/LDLS PM: CPT | Performed by: INTERNAL MEDICINE

## 2024-09-03 NOTE — PROGRESS NOTES
Results for orders placed or performed in visit on 09/03/24   Cardiac EP device report    Narrative    MDT DC PM - ACTIVE SYSTEM IS MRI CONDITIONAL  CARELINK TRANSMISSION: BATTERY VOLTAGE ADEQUATE (13.4 YRS). AP: <0.1% .: 54.9% (>40%~MVP-ON/60). ALL AVAILABLE LEAD PARAMETERS WITHIN NORMAL LIMITS. 17 FAST A&V EPISODES W/ AVAIL EGMS SHOWING AF/RVR W/ -176 BPM, MAX DURATION 1 MIN 38 SECS. 1 AT/AF EPISODE W/ AF IN PROGRESS (HX OF SAME). AF BURDEN: 100%. PT TAKES ELIQUIS, ASA 81MG, METOPROLOL SUCC. EF: 65% (ECHO 8/4/23). NORMAL DEVICE FUNCTION. CH

## 2024-11-14 ENCOUNTER — TELEPHONE (OUTPATIENT)
Age: 89
End: 2024-11-14

## 2024-11-14 NOTE — TELEPHONE ENCOUNTER
Spoke with patient, received call to verify an appointment in December but she was uncertain what it was for.    Reviewed chart, advised has an appointment December 5th for in person pacemaker check and one December 23rd for routine follow up with provider.

## 2024-11-18 ENCOUNTER — TELEPHONE (OUTPATIENT)
Dept: CARDIOLOGY CLINIC | Facility: CLINIC | Age: 89
End: 2024-11-18

## 2024-11-18 NOTE — TELEPHONE ENCOUNTER
Pt dropped off form for ocmpletion.  Dental form  P:[phone]  F:[phone]    Form scanned in pts chart and placed in Dr. Young's bin.

## 2024-12-20 ENCOUNTER — RESULTS FOLLOW-UP (OUTPATIENT)
Dept: CARDIOLOGY CLINIC | Facility: CLINIC | Age: 89
End: 2024-12-20

## 2024-12-20 ENCOUNTER — IN-CLINIC DEVICE VISIT (OUTPATIENT)
Dept: CARDIOLOGY CLINIC | Facility: CLINIC | Age: 89
End: 2024-12-20
Payer: MEDICARE

## 2024-12-20 DIAGNOSIS — Z95.0 PRESENCE OF PERMANENT CARDIAC PACEMAKER: Primary | ICD-10-CM

## 2024-12-20 PROCEDURE — 93280 PM DEVICE PROGR EVAL DUAL: CPT | Performed by: INTERNAL MEDICINE

## 2024-12-20 NOTE — PROGRESS NOTES
MDT DC PM/ACTIVE SYSTEM IS MRI CONDITIONAL   DEVICE INTERROGATED IN THE [place] OFFICE:  BATTERY VOLTAGE ADEQUATE (13.0 YR.).  AP <0.1%  61.4% (MODE SWITCHED).  ALL LEAD PARAMETERS WITHIN NORMAL LIMITS.  NO NEW HIGH RATE EPISODES.  100% AF, ON ELIQUIS AND METOPROLOL.  NO PROGRAMMING CHANGES MADE TO DEVICE PARAMETERS.  NORMAL DEVICE FUNCTION.  RG

## 2024-12-23 ENCOUNTER — OFFICE VISIT (OUTPATIENT)
Dept: CARDIOLOGY CLINIC | Facility: CLINIC | Age: 89
End: 2024-12-23
Payer: MEDICARE

## 2024-12-23 VITALS
BODY MASS INDEX: 29.23 KG/M2 | WEIGHT: 165 LBS | OXYGEN SATURATION: 98 % | SYSTOLIC BLOOD PRESSURE: 126 MMHG | HEIGHT: 63 IN | DIASTOLIC BLOOD PRESSURE: 76 MMHG | RESPIRATION RATE: 16 BRPM | HEART RATE: 91 BPM

## 2024-12-23 DIAGNOSIS — I10 PRIMARY HYPERTENSION: ICD-10-CM

## 2024-12-23 DIAGNOSIS — E78.2 COMBINED HYPERLIPIDEMIA: ICD-10-CM

## 2024-12-23 DIAGNOSIS — I48.0 PAROXYSMAL A-FIB (HCC): Primary | ICD-10-CM

## 2024-12-23 DIAGNOSIS — Z79.01 CHRONIC ANTICOAGULATION: ICD-10-CM

## 2024-12-23 DIAGNOSIS — I49.5 SSS (SICK SINUS SYNDROME) (HCC): ICD-10-CM

## 2024-12-23 PROCEDURE — 99214 OFFICE O/P EST MOD 30 MIN: CPT | Performed by: INTERNAL MEDICINE

## 2024-12-23 RX ORDER — APIXABAN 5 MG/1
5 TABLET, FILM COATED ORAL 2 TIMES DAILY
Qty: 180 TABLET | Refills: 3 | Status: SHIPPED | OUTPATIENT
Start: 2024-12-23

## 2024-12-23 NOTE — PROGRESS NOTES
[facility] [place]  [address]  [place] [address]  Cardiology Follow Up    Tami Estrada  1935  [id number]    Assessment & Plan  Paroxysmal A-fib (HCC)  Continue rate control and anticoagulation.  Symptoms to watch out from cardiac standpoint which would indicate the need for further cardiac evaluation discussed with patient.  SSS (sick sinus syndrome) (HCC)  Patient has history of pacemaker implantation.  Continue to follow-up with pacemaker clinic.  Previous pacemaker interrogation data reviewed with patient.  Chronic anticoagulation  Risks and benefits  and alternatives of anticoagulation to prevent thromboembolic risk from atrial fibrillation discussed at length.  Patient to report any bleeding issues.  Combined hyperlipidemia  Continue atorvastatin 20 mg p.o. daily.  Continue diet and his factor modification.  Primary hypertension  Importance of salt restriction reinforced.  Continue present medications which were reviewed.    Follow-up in 6 months or earlier as needed.  Follow-up with primary care physician.  Patient is agreeable with the plan of care.    Patient is having dental extraction in January.  Patient can stop Eliquis 2 days prior to dental extraction and restart when okay from procedure standpoint.         Chief Complaint   Patient presents with    Follow-up       Interval History:   Patient presents for follow-up visit.  Patient denies any history of chest pain shortness of breath.  Patient denies any history of leg edema or orthopnea PND.  No history of presyncope syncope.  Patient states compliance with the present list of medications.  Patient denies any bleeding issues.  Patient is followed by the pacemaker clinic.    Patient thinking of having dental extraction in January 2025.    Patient Active Problem List   Diagnosis    Paroxysmal A-fib (HCC)    Chronic anticoagulation    Hypertension, essential    Combined hyperlipidemia    History of left knee  replacement    Atherosclerosis of artery of both lower extremities (HCC)    Stage 3a chronic kidney disease (HCC)    Syncope and collapse    Hyponatremia    Sinus pain    Pre-syncope    Sinus pause    Lung nodule seen on imaging study    Multinodular goiter    s/p Medtronic dual chamber PPM with left bundle pacing lead 8/7/2023    Embolism and thrombosis of arteries of the lower extremities (HCC)    Benign hypertension with stage 3b chronic kidney disease (HCC)     Past Medical History:   Diagnosis Date    PMR (polymyalgia rheumatica) (HCC)     s/p Medtronic dual chamber PPM with left bundle pacing lead 8/7/2023 8/8/2023    Stroke (HCC)      Social History     Socioeconomic History    Marital status: /Civil Union     Spouse name: Not on file    Number of children: Not on file    Years of education: Not on file    Highest education level: Not on file   Occupational History    Not on file   Tobacco Use    Smoking status: Never     Passive exposure: Never    Smokeless tobacco: Never   Vaping Use    Vaping status: Never Used   Substance and Sexual Activity    Alcohol use: No    Drug use: No    Sexual activity: Not on file   Other Topics Concern    Not on file   Social History Narrative    Not on file     Social Drivers of Health     Financial Resource Strain: Low Risk  (5/16/2024)    Received from [employer]    Financial Resource Strain     Do you have any trouble paying for your medications, or do you think you might in the future?: No     Does your family have trouble paying for medicine? (Household - for ages 0-17 years): Not on file   Food Insecurity: Patient Declined (5/16/2024)    Received from [employer]    Hunger Vital Sign     Worried About Running Out of Food in the Last Year: Patient declined     Ran Out of Food in the Last Year: Patient declined   Transportation Needs: No Transportation Needs (5/16/2024)    Received from [employer]    Transportation Needs     READ ONLY Do you have trouble getting a ride  to medical visits or work?: Never True     Does your family have a hard time getting a ride to doctors’ visits? (Household - for ages 0-17 years): Not on file     Has lack of transportation kept you from medical appointments, meetings, work, or from getting things needed for daily living? Check all that apply. (Adult - for ages 18 years and over): Not on file     Do you (or your family) have trouble finding or paying for a ride (transportation)? (Household - for ages 0-17 years): Not on file   Physical Activity: Not on file   Stress: Not on file   Social Connections: Socially Integrated (5/16/2024)    Received from [employer]     How often do you feel lonely or isolated from those around you?: Never   Intimate Partner Violence: Not on file   Housing Stability: Low Risk  (5/16/2024)    Received from [employer] [facility]     Do you currently live in a shelter or have no steady place to sleep at night?: No     READ ONLY Do you think you are at risk of becoming homeless?: No     Does your family worry about paying for your home or becoming homeless? (Household - for ages 0-17 years): Not on file     Are you homeless or worried that you might be in the future? (Adult - for ages 18 years and over): Not on file     Are you (or your family) homeless or worried that you might be in the future? (Household - for ages 0-17 years): Not on file      No family history on file.  Past Surgical History:   Procedure Laterality Date    CARDIAC ELECTROPHYSIOLOGY PROCEDURE N/A 8/7/2023    Procedure: Cardiac pacer implant;  Surgeon: Abimael Bain DO;  Location: [facility];  Service: Cardiology    IR AORTAGRAM WITH RUN-OFF  3/9/2021    REPLACEMENT TOTAL KNEE         Current Outpatient Medications:     aspirin (ECOTRIN LOW STRENGTH) 81 mg EC tablet, Take 81 mg by mouth daily, Disp: , Rfl:     atorvastatin (LIPITOR) 20 mg tablet, Take 1 tablet by mouth daily, Disp: , Rfl:     cholecalciferol (VITAMIN  D3) 400 units tablet, Take 400 Units by mouth daily, Disp: , Rfl:     ELIQUIS 5 MG, Take 5 mg by mouth 2 (two) times a day, Disp: , Rfl: 3    fluticasone (FLONASE) 50 mcg/act nasal spray, SPRAY 2 SPRAYS INTO EACH NOSTRIL EVERY DAY, Disp: , Rfl:     gabapentin (NEURONTIN) 300 mg capsule, Take 300 mg by mouth daily at bedtime, Disp: , Rfl:     lisinopril-hydrochlorothiazide (PRINZIDE,ZESTORETIC) 20-12.5 MG per tablet, Take 1 tablet by mouth daily, Disp: , Rfl:     loratadine (CLARITIN) 10 mg tablet, Take 10 mg by mouth daily, Disp: , Rfl: 5    metoprolol succinate (TOPROL-XL) 50 mg 24 hr tablet, Take 1 tablet (50 mg total) by mouth daily Take 1 tablet daily, Disp: 90 tablet, Rfl: 3    predniSONE 5 mg tablet, Take 5 mg by mouth daily, Disp: , Rfl:     Sennosides (LAXATIVE PILLS PO), Take 1 tablet by mouth Twice daily, Disp: , Rfl:     Sodium Fluoride 5000 PPM 1.1 % GEL, BRUSH TWICE DAILY, Disp: , Rfl:   Allergies   Allergen Reactions    Hydrocodone     Oxycodone Anxiety and Headache       Labs:  No visits with results within 2 Month(s) from this visit.   Latest known visit with results is:   Admission on 06/04/2024, Discharged on 06/04/2024   Component Date Value    Ventricular Rate 06/04/2024 95     Atrial Rate 06/04/2024 95     QRSD Interval 06/04/2024 92     QT Interval 06/04/2024 370     QTC Interval 06/04/2024 464     QRS Axis 06/04/2024 -65     T Wave Axis 06/04/2024 84     WBC 06/04/2024 12.52 (H)     RBC 06/04/2024 4.32     Hemoglobin 06/04/2024 13.1     Hematocrit 06/04/2024 40.0     MCV 06/04/2024 93     MCH 06/04/2024 30.3     MCHC 06/04/2024 32.8     RDW 06/04/2024 14.0     MPV 06/04/2024 9.1     Platelets 06/04/2024 217     nRBC 06/04/2024 0     Segmented % 06/04/2024 91 (H)     Immature Grans % 06/04/2024 0     Lymphocytes % 06/04/2024 4 (L)     Monocytes % 06/04/2024 5     Eosinophils Relative 06/04/2024 0     Basophils Relative 06/04/2024 0     Absolute Neutrophils 06/04/2024 11.30 (H)     Absolute  "Immature Grans 06/04/2024 0.05     Absolute Lymphocytes 06/04/2024 0.52 (L)     Absolute Monocytes 06/04/2024 0.62     Eosinophils Absolute 06/04/2024 0.00     Basophils Absolute 06/04/2024 0.03     Sodium 06/04/2024 133 (L)     Potassium 06/04/2024 4.0     Chloride 06/04/2024 99     CO2 06/04/2024 26     ANION GAP 06/04/2024 8     BUN 06/04/2024 15     Creatinine 06/04/2024 0.82     Glucose 06/04/2024 118     Calcium 06/04/2024 9.4     eGFR 06/04/2024 64     hs TnI 0hr 06/04/2024 13     hs TnI 2hr 06/04/2024 13     Delta 2hr hsTnI 06/04/2024 0      Imaging: Cardiac EP device report  Result Date: 12/20/2024  Narrative: MDT DC PM/ACTIVE SYSTEM IS MRI CONDITIONAL DEVICE INTERROGATED IN THE [facility] OFFICE:  BATTERY VOLTAGE ADEQUATE (13.0 YR.).  AP <0.1%  61.4% (MODE SWITCHED).  ALL LEAD PARAMETERS WITHIN NORMAL LIMITS.  NO NEW HIGH RATE EPISODES.  100% AF, ON ELIQUIS AND METOPROLOL.  NO PROGRAMMING CHANGES MADE TO DEVICE PARAMETERS.  NORMAL DEVICE FUNCTION.  RG       Review of Systems:  Review of Systems  REVIEW OF SYSTEMS:  Constitutional:  Denies fever or chills   Eyes:  Denies change in visual acuity   HENT:  Denies nasal congestion or sore throat   Respiratory:  Denies cough or shortness of breath   Cardiovascular:  Denies chest pain or edema   GI:  Denies abdominal pain, nausea, vomiting, bloody stools or diarrhea   :  Denies dysuria, frequency, difficulty in micturition and nocturia  Musculoskeletal:  Denies back pain or joint pain   Neurologic:  Denies headache, focal weakness or sensory changes   Endocrine:  Denies polyuria or polydipsia   Lymphatic:  Denies swollen glands   Psychiatric:  Denies depression or anxiety    Physical Exam:    /76 (BP Location: Right arm, Patient Position: Sitting, Cuff Size: Standard)   Pulse 91   Resp 16   Ht 5' 3\" (1.6 m)   Wt 74.8 kg (165 lb)   SpO2 98%   BMI 29.23 kg/m²     Physical Exam  PHYSICAL EXAM:  General:  Patient is not in acute distress   Head: " Normocephalic, Atraumatic.  HEENT:  Both pupils normal-size atraumatic, normocephalic, nonicteric  Neck:  JVP not raised. Trachea central. No carotid bruit  Respiratory:  normal breath sounds no crackles. no rhonchi  Cardiovascular:  Regular rate and rhythm no S3 no murmurs  GI:  Abdomen soft nontender. No organomegaly.   Lymphatic:  No cervical or inguinal lymphadenopathy  Neurologic:  Patient is awake alert, oriented . Grossly nonfocal  Extremities no edema

## 2024-12-23 NOTE — ASSESSMENT & PLAN NOTE
Continue rate control and anticoagulation.  Symptoms to watch out from cardiac standpoint which would indicate the need for further cardiac evaluation discussed with patient.

## 2024-12-23 NOTE — ASSESSMENT & PLAN NOTE
Risks and benefits  and alternatives of anticoagulation to prevent thromboembolic risk from atrial fibrillation discussed at length.  Patient to report any bleeding issues.

## 2025-03-25 ENCOUNTER — REMOTE DEVICE CLINIC VISIT (OUTPATIENT)
Dept: CARDIOLOGY CLINIC | Facility: CLINIC | Age: OVER 89
End: 2025-03-25
Payer: MEDICARE

## 2025-03-25 ENCOUNTER — RESULTS FOLLOW-UP (OUTPATIENT)
Dept: CARDIOLOGY CLINIC | Facility: CLINIC | Age: OVER 89
End: 2025-03-25

## 2025-03-25 DIAGNOSIS — Z95.0 CARDIAC PACEMAKER IN SITU: Primary | ICD-10-CM

## 2025-03-25 PROCEDURE — 93294 REM INTERROG EVL PM/LDLS PM: CPT | Performed by: INTERNAL MEDICINE

## 2025-03-25 PROCEDURE — 93296 REM INTERROG EVL PM/IDS: CPT | Performed by: INTERNAL MEDICINE

## 2025-03-25 NOTE — PROGRESS NOTES
Results for orders placed or performed in visit on 03/25/25   Cardiac EP device report    Narrative    MDT DC PM/ACTIVE SYSTEM IS MRI CONDITIONAL  CAREL[employer] TRANSMISSION: BATTERY VOLTAGE ADEQUATE (12.6 YRS). AP-0.1%, -77% (>40% AF/MVP@60PPM). ALL AVAILABLE LEAD PARAMETERS WITHIN NORMAL LIMITS. 1 AF EPISDOE IN PROGRESS SINCE 5/8/24. AF BURDEN-100%. PT ON ELIQUIS, ASA 81MG & METOPROLOL. NORMAL DEVICE FUNCTION. GV

## 2025-06-13 ENCOUNTER — HOSPITAL ENCOUNTER (OUTPATIENT)
Dept: NON INVASIVE DIAGNOSTICS | Facility: CLINIC | Age: OVER 89
Discharge: HOME | End: 2025-06-13
Attending: PHYSICIAN ASSISTANT
Payer: MEDICARE

## 2025-06-13 DIAGNOSIS — I70.203 ATHEROSCLEROSIS OF ARTERY OF BOTH LOWER EXTREMITIES (HCC): ICD-10-CM

## 2025-06-13 PROCEDURE — 93922 UPR/L XTREMITY ART 2 LEVELS: CPT | Performed by: SURGERY

## 2025-06-13 PROCEDURE — 93925 LOWER EXTREMITY STUDY: CPT

## 2025-06-13 PROCEDURE — 93925 LOWER EXTREMITY STUDY: CPT | Performed by: SURGERY

## 2025-06-13 PROCEDURE — 93923 UPR/LXTR ART STDY 3+ LVLS: CPT

## 2025-06-14 ENCOUNTER — RESULTS FOLLOW-UP (OUTPATIENT)
Dept: VASCULAR SURGERY | Facility: CLINIC | Age: OVER 89
End: 2025-06-14

## 2025-06-16 ENCOUNTER — TELEPHONE (OUTPATIENT)
Dept: VASCULAR SURGERY | Facility: CLINIC | Age: OVER 89
End: 2025-06-16

## 2025-06-16 NOTE — TELEPHONE ENCOUNTER
Patient asking instead of having a OVS scheduled if a letter can be mailed to her because its hard for her to come in and she's hard of hearing.

## 2025-06-16 NOTE — TELEPHONE ENCOUNTER
Attempted to contact patient to schedule appointment(s) listed below.  Requested patient call [phone] to schedule appointment(s).    Patient's appointment(s) are due now.    Dopplers  [] Abdominal Aorta Iliac (AOIL)  [] Carotid (CV)   [] Celiac and/or Mesenteric  [] Endovascular Aortic Repair (EVAR)   [] Hemodialysis Access (HD)   [x] Lower Limb Arterial (ZORAIDA)  [] Lower Limb Venous (LEV)  [] Lower Limb Venous Duplex with Reflux (LEVDR)  [] Renal Artery  [] Upper Limb Arterial (UEA)    [] Upper Limb Venous (UEV)              [] TOÑO and Waveform analysis     Advanced Imaging   [] CTA head/neck    [] CTA abdomen    [] CTA abdomen & pelvis    [] CT abdomen with/ without contrast  [] CT abdomen with contrast  [] CT abdomen without contrast    [] CT abdomen & pelvis with/ without contrast  [] CT abdomen & pelvis with contrast  [] CT abdomen & pelvis without contrast    Office Visit   [] New patient, patient last seen over 3 years ago  [x] Risk factor modification (RFM)   [x] Follow up   [] Lost to follow up (LTFU)   Called patient & LMOM to schedule 1 yr rfm ov /rr zoraida 6/13/25

## 2025-06-24 ENCOUNTER — REMOTE DEVICE CLINIC VISIT (OUTPATIENT)
Dept: CARDIOLOGY CLINIC | Facility: CLINIC | Age: OVER 89
End: 2025-06-24
Payer: MEDICARE

## 2025-06-24 DIAGNOSIS — Z95.0 CARDIAC PACEMAKER IN SITU: Primary | ICD-10-CM

## 2025-06-24 PROCEDURE — 93296 REM INTERROG EVL PM/IDS: CPT | Performed by: INTERNAL MEDICINE

## 2025-06-24 PROCEDURE — 93294 REM INTERROG EVL PM/LDLS PM: CPT | Performed by: INTERNAL MEDICINE

## 2025-06-24 NOTE — PROGRESS NOTES
Results for orders placed or performed in visit on 06/24/25   Cardiac EP device report    Narrative    MDT DC PM/ACTIVE SYSTEM IS MRI CONDITIONAL  CARELINK TRANSMISSION: BATTERY VOLTAGE ADEQUATE (12.4 YRS). AP-0.1%, -74% (>40% AF/MVP@60PPM). ALL AVAILABLE LEAD PARAMETERS WITHIN NORMAL LIMITS. 24 FAST A&V EPISODES @ 149-171 BPM MAX DURATION 6.1 MINS- RVR ON EGM'S. 1 AF EPISDOE IN PROGRESS SINCE 5/8/24. AF BURDEN-100%. PT ON ELIQUIS, ASA 81MG & METOPROLOL. NORMAL DEVICE FUNCTION. GV

## (undated) DEVICE — CATH GUIDING FIXED SHAPE 43CM

## (undated) DEVICE — SLITTER ADJUSTABLE

## (undated) DEVICE — DGW .035 FC J3MM 150CM TEF: Brand: EMERALD

## (undated) DEVICE — RADIFOCUS GLIDEWIRE: Brand: GLIDEWIRE

## (undated) DEVICE — INTRO SHEATH PEEL AWAY 7FR